# Patient Record
Sex: FEMALE | Race: WHITE | Employment: OTHER | URBAN - METROPOLITAN AREA
[De-identification: names, ages, dates, MRNs, and addresses within clinical notes are randomized per-mention and may not be internally consistent; named-entity substitution may affect disease eponyms.]

---

## 2024-07-17 ENCOUNTER — APPOINTMENT (OUTPATIENT)
Dept: ULTRASOUND IMAGING | Age: 84
DRG: 272 | End: 2024-07-17
Payer: MEDICARE

## 2024-07-17 ENCOUNTER — HOSPITAL ENCOUNTER (INPATIENT)
Age: 84
LOS: 2 days | Discharge: HOME | DRG: 272 | End: 2024-07-19
Attending: EMERGENCY MEDICINE | Admitting: STUDENT IN AN ORGANIZED HEALTH CARE EDUCATION/TRAINING PROGRAM
Payer: MEDICARE

## 2024-07-17 DIAGNOSIS — I82.409 DVT (DEEP VENOUS THROMBOSIS) (HCC): ICD-10-CM

## 2024-07-17 DIAGNOSIS — I82.412 ACUTE DEEP VEIN THROMBOSIS (DVT) OF FEMORAL VEIN OF LEFT LOWER EXTREMITY (HCC): Primary | ICD-10-CM

## 2024-07-17 PROBLEM — I82.402 LEG DVT (DEEP VENOUS THROMBOEMBOLISM), ACUTE, LEFT (HCC): Status: ACTIVE | Noted: 2024-07-17

## 2024-07-17 LAB
ALBUMIN SERPL-MCNC: 3.1 GM/DL (ref 3.4–5)
ALP BLD-CCNC: 80 IU/L (ref 40–129)
ALT SERPL-CCNC: 10 U/L (ref 10–40)
ANION GAP SERPL CALCULATED.3IONS-SCNC: 13 MMOL/L (ref 7–16)
APTT: 28.2 SECONDS (ref 25.1–37.1)
AST SERPL-CCNC: 23 IU/L (ref 15–37)
BASOPHILS ABSOLUTE: 0 K/CU MM
BASOPHILS RELATIVE PERCENT: 0.4 % (ref 0–1)
BILIRUB SERPL-MCNC: 0.8 MG/DL (ref 0–1)
BUN SERPL-MCNC: 15 MG/DL (ref 6–23)
CALCIUM SERPL-MCNC: 8.9 MG/DL (ref 8.3–10.6)
CHLORIDE BLD-SCNC: 100 MMOL/L (ref 99–110)
CO2: 26 MMOL/L (ref 21–32)
CREAT SERPL-MCNC: 1.3 MG/DL (ref 0.6–1.1)
DIFFERENTIAL TYPE: ABNORMAL
EOSINOPHILS ABSOLUTE: 0 K/CU MM
EOSINOPHILS RELATIVE PERCENT: 0 % (ref 0–3)
GFR, ESTIMATED: 40 ML/MIN/1.73M2
GLUCOSE SERPL-MCNC: 139 MG/DL (ref 70–99)
HCT VFR BLD CALC: 38.6 % (ref 37–47)
HEMOGLOBIN: 12 GM/DL (ref 12.5–16)
IMMATURE NEUTROPHIL %: 0.3 % (ref 0–0.43)
INR BLD: 0.9 INDEX
LYMPHOCYTES ABSOLUTE: 1.8 K/CU MM
LYMPHOCYTES RELATIVE PERCENT: 23 % (ref 24–44)
MCH RBC QN AUTO: 31.7 PG (ref 27–31)
MCHC RBC AUTO-ENTMCNC: 31.1 % (ref 32–36)
MCV RBC AUTO: 101.8 FL (ref 78–100)
MONOCYTES ABSOLUTE: 0.9 K/CU MM
MONOCYTES RELATIVE PERCENT: 11.6 % (ref 0–4)
NEUTROPHILS ABSOLUTE: 5.1 K/CU MM
NEUTROPHILS RELATIVE PERCENT: 64.7 % (ref 36–66)
PDW BLD-RTO: 15.5 % (ref 11.7–14.9)
PLATELET # BLD: 113 K/CU MM (ref 140–440)
PMV BLD AUTO: 12.4 FL (ref 7.5–11.1)
POTASSIUM SERPL-SCNC: 4.3 MMOL/L (ref 3.5–5.1)
PROTHROMBIN TIME: 13.2 SECONDS (ref 11.7–14.5)
RBC # BLD: 3.79 M/CU MM (ref 4.2–5.4)
REASON FOR REJECTION: NORMAL
REJECTED TEST: NORMAL
SODIUM BLD-SCNC: 139 MMOL/L (ref 135–145)
TOTAL IMMATURE NEUTOROPHIL: 0.02 K/CU MM
TOTAL PROTEIN: 6 GM/DL (ref 6.4–8.2)
WBC # BLD: 7.9 K/CU MM (ref 4–10.5)

## 2024-07-17 PROCEDURE — 96372 THER/PROPH/DIAG INJ SC/IM: CPT

## 2024-07-17 PROCEDURE — 2700000000 HC OXYGEN THERAPY PER DAY

## 2024-07-17 PROCEDURE — 85730 THROMBOPLASTIN TIME PARTIAL: CPT

## 2024-07-17 PROCEDURE — 2580000003 HC RX 258: Performed by: STUDENT IN AN ORGANIZED HEALTH CARE EDUCATION/TRAINING PROGRAM

## 2024-07-17 PROCEDURE — 93971 EXTREMITY STUDY: CPT

## 2024-07-17 PROCEDURE — 85025 COMPLETE CBC W/AUTO DIFF WBC: CPT

## 2024-07-17 PROCEDURE — G0378 HOSPITAL OBSERVATION PER HR: HCPCS

## 2024-07-17 PROCEDURE — 1200000000 HC SEMI PRIVATE

## 2024-07-17 PROCEDURE — 85610 PROTHROMBIN TIME: CPT

## 2024-07-17 PROCEDURE — 99285 EMERGENCY DEPT VISIT HI MDM: CPT

## 2024-07-17 PROCEDURE — 6360000002 HC RX W HCPCS: Performed by: EMERGENCY MEDICINE

## 2024-07-17 PROCEDURE — 80053 COMPREHEN METABOLIC PANEL: CPT

## 2024-07-17 PROCEDURE — 6370000000 HC RX 637 (ALT 250 FOR IP): Performed by: STUDENT IN AN ORGANIZED HEALTH CARE EDUCATION/TRAINING PROGRAM

## 2024-07-17 RX ORDER — METOPROLOL TARTRATE 50 MG/1
50 TABLET, FILM COATED ORAL 2 TIMES DAILY
COMMUNITY
Start: 2011-05-26 | End: 2024-07-20

## 2024-07-17 RX ORDER — ACETAMINOPHEN 650 MG/1
650 SUPPOSITORY RECTAL EVERY 6 HOURS PRN
Status: DISCONTINUED | OUTPATIENT
Start: 2024-07-17 | End: 2024-07-19 | Stop reason: HOSPADM

## 2024-07-17 RX ORDER — SODIUM CHLORIDE 9 MG/ML
INJECTION, SOLUTION INTRAVENOUS PRN
Status: DISCONTINUED | OUTPATIENT
Start: 2024-07-17 | End: 2024-07-17

## 2024-07-17 RX ORDER — LEVOTHYROXINE SODIUM 0.15 MG/1
150 TABLET ORAL DAILY
Status: DISCONTINUED | OUTPATIENT
Start: 2024-07-18 | End: 2024-07-19 | Stop reason: HOSPADM

## 2024-07-17 RX ORDER — SODIUM CHLORIDE 0.9 % (FLUSH) 0.9 %
5-40 SYRINGE (ML) INJECTION PRN
Status: DISCONTINUED | OUTPATIENT
Start: 2024-07-17 | End: 2024-07-17

## 2024-07-17 RX ORDER — ENOXAPARIN SODIUM 100 MG/ML
1 INJECTION SUBCUTANEOUS 2 TIMES DAILY
Status: DISCONTINUED | OUTPATIENT
Start: 2024-07-18 | End: 2024-07-17

## 2024-07-17 RX ORDER — LANOLIN ALCOHOL/MO/W.PET/CERES
400 CREAM (GRAM) TOPICAL 2 TIMES DAILY
COMMUNITY
Start: 2024-04-18

## 2024-07-17 RX ORDER — LANOLIN ALCOHOL/MO/W.PET/CERES
3 CREAM (GRAM) TOPICAL NIGHTLY PRN
Status: DISCONTINUED | OUTPATIENT
Start: 2024-07-17 | End: 2024-07-17

## 2024-07-17 RX ORDER — ENOXAPARIN SODIUM 100 MG/ML
1 INJECTION SUBCUTANEOUS 2 TIMES DAILY
Status: DISCONTINUED | OUTPATIENT
Start: 2024-07-18 | End: 2024-07-18

## 2024-07-17 RX ORDER — ONDANSETRON 2 MG/ML
4 INJECTION INTRAMUSCULAR; INTRAVENOUS EVERY 6 HOURS PRN
Status: DISCONTINUED | OUTPATIENT
Start: 2024-07-17 | End: 2024-07-19 | Stop reason: HOSPADM

## 2024-07-17 RX ORDER — VALACYCLOVIR HYDROCHLORIDE 500 MG/1
500 TABLET, FILM COATED ORAL DAILY
COMMUNITY
Start: 2014-12-04

## 2024-07-17 RX ORDER — POTASSIUM CHLORIDE 7.45 MG/ML
10 INJECTION INTRAVENOUS PRN
Status: DISCONTINUED | OUTPATIENT
Start: 2024-07-17 | End: 2024-07-17

## 2024-07-17 RX ORDER — MAGNESIUM SULFATE IN WATER 40 MG/ML
2000 INJECTION, SOLUTION INTRAVENOUS PRN
Status: DISCONTINUED | OUTPATIENT
Start: 2024-07-17 | End: 2024-07-19 | Stop reason: HOSPADM

## 2024-07-17 RX ORDER — POLYETHYLENE GLYCOL 3350 17 G/17G
17 POWDER, FOR SOLUTION ORAL DAILY PRN
Status: DISCONTINUED | OUTPATIENT
Start: 2024-07-17 | End: 2024-07-19 | Stop reason: HOSPADM

## 2024-07-17 RX ORDER — LEFLUNOMIDE 20 MG/1
10 TABLET ORAL DAILY
COMMUNITY
Start: 2011-05-26

## 2024-07-17 RX ORDER — POTASSIUM CHLORIDE 20 MEQ/1
20 TABLET, EXTENDED RELEASE ORAL 2 TIMES DAILY
COMMUNITY
Start: 2023-07-11

## 2024-07-17 RX ORDER — SODIUM CHLORIDE 0.9 % (FLUSH) 0.9 %
5-40 SYRINGE (ML) INJECTION PRN
Status: DISCONTINUED | OUTPATIENT
Start: 2024-07-17 | End: 2024-07-19 | Stop reason: HOSPADM

## 2024-07-17 RX ORDER — PREDNISONE 5 MG/1
5 TABLET ORAL DAILY
Status: DISCONTINUED | OUTPATIENT
Start: 2024-07-18 | End: 2024-07-19 | Stop reason: HOSPADM

## 2024-07-17 RX ORDER — ENOXAPARIN SODIUM 100 MG/ML
1 INJECTION SUBCUTANEOUS ONCE
Status: COMPLETED | OUTPATIENT
Start: 2024-07-17 | End: 2024-07-17

## 2024-07-17 RX ORDER — POTASSIUM CHLORIDE 20 MEQ/1
20 TABLET, EXTENDED RELEASE ORAL 2 TIMES DAILY
Status: DISCONTINUED | OUTPATIENT
Start: 2024-07-17 | End: 2024-07-19 | Stop reason: HOSPADM

## 2024-07-17 RX ORDER — POTASSIUM CHLORIDE 7.45 MG/ML
10 INJECTION INTRAVENOUS PRN
Status: DISCONTINUED | OUTPATIENT
Start: 2024-07-17 | End: 2024-07-19 | Stop reason: HOSPADM

## 2024-07-17 RX ORDER — MAGNESIUM SULFATE IN WATER 40 MG/ML
2000 INJECTION, SOLUTION INTRAVENOUS PRN
Status: DISCONTINUED | OUTPATIENT
Start: 2024-07-17 | End: 2024-07-17

## 2024-07-17 RX ORDER — ACETAMINOPHEN 650 MG/1
650 SUPPOSITORY RECTAL EVERY 6 HOURS PRN
Status: DISCONTINUED | OUTPATIENT
Start: 2024-07-17 | End: 2024-07-17

## 2024-07-17 RX ORDER — VALACYCLOVIR HYDROCHLORIDE 500 MG/1
500 TABLET, FILM COATED ORAL DAILY
Status: DISCONTINUED | OUTPATIENT
Start: 2024-07-18 | End: 2024-07-19 | Stop reason: HOSPADM

## 2024-07-17 RX ORDER — SODIUM CHLORIDE 0.9 % (FLUSH) 0.9 %
5-40 SYRINGE (ML) INJECTION EVERY 12 HOURS SCHEDULED
Status: DISCONTINUED | OUTPATIENT
Start: 2024-07-17 | End: 2024-07-17

## 2024-07-17 RX ORDER — PANTOPRAZOLE SODIUM 40 MG/1
40 TABLET, DELAYED RELEASE ORAL DAILY
COMMUNITY
Start: 2019-05-23 | End: 2024-09-10

## 2024-07-17 RX ORDER — ACETAMINOPHEN 325 MG/1
650 TABLET ORAL EVERY 6 HOURS PRN
Status: DISCONTINUED | OUTPATIENT
Start: 2024-07-17 | End: 2024-07-17

## 2024-07-17 RX ORDER — LEVOTHYROXINE SODIUM 150 MCG
150 TABLET ORAL DAILY
COMMUNITY
Start: 2024-01-10 | End: 2025-01-09

## 2024-07-17 RX ORDER — PANTOPRAZOLE SODIUM 40 MG/1
40 TABLET, DELAYED RELEASE ORAL DAILY
Status: DISCONTINUED | OUTPATIENT
Start: 2024-07-18 | End: 2024-07-19 | Stop reason: HOSPADM

## 2024-07-17 RX ORDER — ONDANSETRON 4 MG/1
4 TABLET, ORALLY DISINTEGRATING ORAL EVERY 8 HOURS PRN
Status: DISCONTINUED | OUTPATIENT
Start: 2024-07-17 | End: 2024-07-17

## 2024-07-17 RX ORDER — LANOLIN ALCOHOL/MO/W.PET/CERES
3 CREAM (GRAM) TOPICAL NIGHTLY PRN
Status: DISCONTINUED | OUTPATIENT
Start: 2024-07-17 | End: 2024-07-19 | Stop reason: HOSPADM

## 2024-07-17 RX ORDER — ONDANSETRON 2 MG/ML
4 INJECTION INTRAMUSCULAR; INTRAVENOUS EVERY 6 HOURS PRN
Status: DISCONTINUED | OUTPATIENT
Start: 2024-07-17 | End: 2024-07-17

## 2024-07-17 RX ORDER — LANOLIN ALCOHOL/MO/W.PET/CERES
400 CREAM (GRAM) TOPICAL 2 TIMES DAILY
Status: DISCONTINUED | OUTPATIENT
Start: 2024-07-17 | End: 2024-07-19 | Stop reason: HOSPADM

## 2024-07-17 RX ORDER — LEFLUNOMIDE 20 MG/1
10 TABLET ORAL DAILY
Status: DISCONTINUED | OUTPATIENT
Start: 2024-07-18 | End: 2024-07-19 | Stop reason: HOSPADM

## 2024-07-17 RX ORDER — PREDNISONE 2.5 MG/1
5 TABLET ORAL DAILY
COMMUNITY

## 2024-07-17 RX ORDER — FUROSEMIDE 20 MG/1
20 TABLET ORAL DAILY PRN
COMMUNITY
Start: 2022-03-23

## 2024-07-17 RX ORDER — POLYETHYLENE GLYCOL 3350 17 G/17G
17 POWDER, FOR SOLUTION ORAL DAILY PRN
Status: DISCONTINUED | OUTPATIENT
Start: 2024-07-17 | End: 2024-07-17

## 2024-07-17 RX ORDER — METOPROLOL TARTRATE 50 MG/1
50 TABLET, FILM COATED ORAL 2 TIMES DAILY
Status: DISCONTINUED | OUTPATIENT
Start: 2024-07-17 | End: 2024-07-19 | Stop reason: HOSPADM

## 2024-07-17 RX ORDER — SODIUM CHLORIDE 9 MG/ML
INJECTION, SOLUTION INTRAVENOUS PRN
Status: DISCONTINUED | OUTPATIENT
Start: 2024-07-17 | End: 2024-07-19 | Stop reason: HOSPADM

## 2024-07-17 RX ADMIN — Medication 3 MG: at 21:46

## 2024-07-17 RX ADMIN — SODIUM CHLORIDE, PRESERVATIVE FREE 10 ML: 5 INJECTION INTRAVENOUS at 21:47

## 2024-07-17 RX ADMIN — POTASSIUM CHLORIDE 20 MEQ: 1500 TABLET, EXTENDED RELEASE ORAL at 21:46

## 2024-07-17 RX ADMIN — ENOXAPARIN SODIUM 90 MG: 100 INJECTION SUBCUTANEOUS at 16:35

## 2024-07-17 RX ADMIN — Medication 400 MG: at 21:45

## 2024-07-17 RX ADMIN — ACETAMINOPHEN 650 MG: 325 TABLET ORAL at 21:46

## 2024-07-17 RX ADMIN — METOPROLOL TARTRATE 50 MG: 50 TABLET, FILM COATED ORAL at 21:46

## 2024-07-17 ASSESSMENT — PAIN DESCRIPTION - ORIENTATION
ORIENTATION: LEFT

## 2024-07-17 ASSESSMENT — PAIN - FUNCTIONAL ASSESSMENT
PAIN_FUNCTIONAL_ASSESSMENT: ACTIVITIES ARE NOT PREVENTED
PAIN_FUNCTIONAL_ASSESSMENT: PREVENTS OR INTERFERES SOME ACTIVE ACTIVITIES AND ADLS
PAIN_FUNCTIONAL_ASSESSMENT: 0-10

## 2024-07-17 ASSESSMENT — PAIN SCALES - GENERAL
PAINLEVEL_OUTOF10: 6
PAINLEVEL_OUTOF10: 6
PAINLEVEL_OUTOF10: 8
PAINLEVEL_OUTOF10: 3

## 2024-07-17 ASSESSMENT — PAIN DESCRIPTION - LOCATION
LOCATION: LEG

## 2024-07-17 ASSESSMENT — PAIN DESCRIPTION - PAIN TYPE: TYPE: ACUTE PAIN

## 2024-07-17 ASSESSMENT — PAIN DESCRIPTION - ONSET: ONSET: ON-GOING

## 2024-07-17 ASSESSMENT — PAIN DESCRIPTION - FREQUENCY: FREQUENCY: CONTINUOUS

## 2024-07-17 ASSESSMENT — PAIN SCALES - WONG BAKER: WONGBAKER_NUMERICALRESPONSE: HURTS A LITTLE BIT

## 2024-07-17 ASSESSMENT — PAIN DESCRIPTION - DESCRIPTORS
DESCRIPTORS: THROBBING
DESCRIPTORS: THROBBING

## 2024-07-17 ASSESSMENT — PAIN DESCRIPTION - DIRECTION: RADIATING_TOWARDS: DENIES

## 2024-07-17 NOTE — H&P
History and Physical      Name:  Edna Dowell /Age/Sex: 1940  (84 y.o. female)   MRN & CSN:  [id number] & [id number] Encounter Date/Time: 2024 7:42 PM   Location:  [facility]-1/OTF1 PCP: No primary care provider on file.       Hospital Day: 1    Assessment and Plan:     Patient is a 84-year-old female who presented with leg swelling. Transferred from [place] ED.     # Acute unprovoked occlusive DVT of left femoral vein  - Endorsed worsening left leg swelling over past 3-4 days. No fall, injury, recent surgeries, travel or prolonged immobility. No previous VTE. No weight loss.   - NVI distally. US showed extensive occlusive DVT of CFV extending into calf veins.   - Started on full-dose Lovenox in ED, continue.   - Cardiology consult for possible thrombectomy, appreciate assistance.     # Acute hypoxemic respiratory failure  - Denied any SOB, presyncope, syncope, CP, orthopnea or PND.  - Requiring 2L on arrival with mild tachycardia.  - Follow-up CTPE, on Lovenox as above.     # CKD3b  - Labs stable, monitor labs after CTPE.    # Essential hypertension  - Continue Lopressor.    # Rheumatoid arthritis   - Continue Arava and Prednisone with prophylactic Valtrex.     # Acquired hypothyroidism  - Continue Synthroid.  - Follow-up repeat TSH.    # GERD  - Continue PPI.    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 Has been in remission for over 10 years. Followed by H/O at [facility].     # Class I obesity  - BMI 34.2.     Checklist:  Advanced care planning: full  Diet: NPO past midnight pending Cardiology evaluation   DVT ppx: Lovenox    Disposition: admit to inpatient.  Estimated discharge: 1-2 day(s).  Current living situation: home.  Expected disposition: home.    Spoke with ED provider who recommended admission for the patient and I agree with that plan.  Personally reviewed lab studies and imaging.  EKG interpreted personally and results as stated above.  Imaging that was interpreted

## 2024-07-17 NOTE — ED PROVIDER NOTES
medications:  Medications   enoxaparin (LOVENOX) injection 90 mg (has no administration in time range)         Chronic conditions affecting care: History of multiple myeloma    Social Determinants : None    Records Reviewed : Outpatient Notes patient was seen by [facility] on 5/14/2024, has history of multiple myeloma stage II intervention.  Also has history of localized amyloidosis of the rectum.  Had been on maintenance therapy with Revlimid through 2018 but is not currently, had an autologous bone marrow transplantation at [facility] in 2011    There is a telephone encounter from yesterday that patient was having issues with leg swelling, and reported having an ultrasound on it scheduled for Thursday but unable to walk on it yesterday.  Has a follow-up appointment and lab on 7/25    Disposition Considerations (tests considered but not done, Shared Decision Making, Pt Expectation of Test or Tx.):     Patient's creatinine 1.3, normal sodium potassium, normal BUN.  Hemoglobin of 12 with a normal white blood cell count.  Platelets of 113, which is slightly low.    She is found to have extensive DVT that is mostly occlusive, including at the left femoral vein.  Given this I will consult our cardiology vascular team so that she can be evaluated for potential intervention.  She is not able to ambulate or get around due to the pain and swelling in the leg currently and we will plan to admit.  She was comfortable with plan to consult our cardiology team and transferred to the [facility] for admission.   at bedside, they both were comfortable with this and had no other questions at this time.  1 dose of Lovenox will be given given her slightly low platelets.      ED Course as of 07/17/24 1620   Wed Jul 17, 2024   1503 Checked on patient's, she is in no distress, resting on the bed.  Labs have returned, ultrasound tech was called in, we are awaiting their arrival.  Patient

## 2024-07-18 ENCOUNTER — APPOINTMENT (OUTPATIENT)
Dept: NON INVASIVE DIAGNOSTICS | Age: 84
DRG: 272 | End: 2024-07-18
Payer: MEDICARE

## 2024-07-18 ENCOUNTER — APPOINTMENT (OUTPATIENT)
Dept: CT IMAGING | Age: 84
DRG: 272 | End: 2024-07-18
Payer: MEDICARE

## 2024-07-18 LAB
ACTIVATED CLOTTING TIME, LOW RANGE: >400 SEC
ANION GAP SERPL CALCULATED.3IONS-SCNC: 10 MMOL/L (ref 7–16)
BASOPHILS ABSOLUTE: 0 K/CU MM
BASOPHILS RELATIVE PERCENT: 0.5 % (ref 0–1)
BUN SERPL-MCNC: 15 MG/DL (ref 6–23)
CALCIUM SERPL-MCNC: 8.2 MG/DL (ref 8.3–10.6)
CHLORIDE BLD-SCNC: 104 MMOL/L (ref 99–110)
CO2: 26 MMOL/L (ref 21–32)
CREAT SERPL-MCNC: 1.3 MG/DL (ref 0.6–1.1)
DIFFERENTIAL TYPE: ABNORMAL
ECHO AO ROOT DIAM: 3.2 CM
ECHO AO ROOT INDEX: 1.67 CM/M2
ECHO AV AREA PEAK VELOCITY: 1.8 CM2
ECHO AV AREA VTI: 2.1 CM2
ECHO AV AREA/BSA PEAK VELOCITY: 0.9 CM2/M2
ECHO AV AREA/BSA VTI: 1.1 CM2/M2
ECHO AV MEAN GRADIENT: 4 MMHG
ECHO AV MEAN VELOCITY: 1 M/S
ECHO AV PEAK GRADIENT: 7 MMHG
ECHO AV PEAK VELOCITY: 1.3 M/S
ECHO AV VELOCITY RATIO: 0.62
ECHO AV VTI: 21 CM
ECHO BSA: 1.98 M2
ECHO BSA: 1.98 M2
ECHO LA AREA 4C: 25 CM2
ECHO LA DIAMETER INDEX: 0.99 CM/M2
ECHO LA DIAMETER: 1.9 CM
ECHO LA MAJOR AXIS: 7.1 CM
ECHO LA TO AORTIC ROOT RATIO: 0.59
ECHO LA VOL MOD A4C: 69 ML (ref 22–52)
ECHO LA VOLUME INDEX MOD A4C: 36 ML/M2 (ref 16–34)
ECHO LV E' LATERAL VELOCITY: 9 CM/S
ECHO LV E' SEPTAL VELOCITY: 10 CM/S
ECHO LV EDV A4C: 58 ML
ECHO LV EDV INDEX A4C: 30 ML/M2
ECHO LV EJECTION FRACTION A4C: 65 %
ECHO LV ESV A4C: 20 ML
ECHO LV ESV INDEX A4C: 10 ML/M2
ECHO LV FRACTIONAL SHORTENING: 30 % (ref 28–44)
ECHO LV INTERNAL DIMENSION DIASTOLE INDEX: 1.93 CM/M2
ECHO LV INTERNAL DIMENSION DIASTOLIC: 3.7 CM (ref 3.9–5.3)
ECHO LV INTERNAL DIMENSION SYSTOLIC INDEX: 1.35 CM/M2
ECHO LV INTERNAL DIMENSION SYSTOLIC: 2.6 CM
ECHO LV IVSD: 1.3 CM (ref 0.6–0.9)
ECHO LV MASS 2D: 166.5 G (ref 67–162)
ECHO LV MASS INDEX 2D: 86.7 G/M2 (ref 43–95)
ECHO LV POSTERIOR WALL DIASTOLIC: 1.3 CM (ref 0.6–0.9)
ECHO LV RELATIVE WALL THICKNESS RATIO: 0.7
ECHO LVOT AREA: 2.8 CM2
ECHO LVOT AV VTI INDEX: 0.73
ECHO LVOT DIAM: 1.9 CM
ECHO LVOT MEAN GRADIENT: 1 MMHG
ECHO LVOT PEAK GRADIENT: 3 MMHG
ECHO LVOT PEAK VELOCITY: 0.8 M/S
ECHO LVOT STROKE VOLUME INDEX: 22.7 ML/M2
ECHO LVOT SV: 43.6 ML
ECHO LVOT VTI: 15.4 CM
ECHO MV A VELOCITY: 0.77 M/S
ECHO MV AREA VTI: 1.7 CM2
ECHO MV E VELOCITY: 0.91 M/S
ECHO MV E/A RATIO: 1.18
ECHO MV E/E' LATERAL: 10.11
ECHO MV E/E' RATIO (AVERAGED): 9.61
ECHO MV E/E' SEPTAL: 9.1
ECHO MV LVOT VTI INDEX: 1.66
ECHO MV MAX VELOCITY: 1 M/S
ECHO MV MEAN GRADIENT: 2 MMHG
ECHO MV MEAN VELOCITY: 0.7 M/S
ECHO MV PEAK GRADIENT: 4 MMHG
ECHO MV VTI: 25.5 CM
ECHO RV MID DIMENSION: 3.5 CM
EOSINOPHILS ABSOLUTE: 0 K/CU MM
EOSINOPHILS RELATIVE PERCENT: 0.5 % (ref 0–3)
ESTIMATED AVERAGE GLUCOSE: 120 MG/DL
FOLATE SERPL-MCNC: 15.4 NG/ML (ref 3.1–17.5)
GFR, ESTIMATED: 40 ML/MIN/1.73M2
GLUCOSE SERPL-MCNC: 105 MG/DL (ref 70–99)
HBA1C MFR BLD: 5.8 % (ref 4.2–6.3)
HCT VFR BLD CALC: 34.7 % (ref 37–47)
HEMOGLOBIN: 10.7 GM/DL (ref 12.5–16)
IMMATURE NEUTROPHIL %: 0.5 % (ref 0–0.43)
INR BLD: 1.1 INDEX
LYMPHOCYTES ABSOLUTE: 1.7 K/CU MM
LYMPHOCYTES RELATIVE PERCENT: 27.3 % (ref 24–44)
MCH RBC QN AUTO: 31.6 PG (ref 27–31)
MCHC RBC AUTO-ENTMCNC: 30.8 % (ref 32–36)
MCV RBC AUTO: 102.4 FL (ref 78–100)
MONOCYTES ABSOLUTE: 0.7 K/CU MM
MONOCYTES RELATIVE PERCENT: 11.7 % (ref 0–4)
NEUTROPHILS ABSOLUTE: 3.6 K/CU MM
NEUTROPHILS RELATIVE PERCENT: 59.5 % (ref 36–66)
NUCLEATED RBC %: 0 %
PDW BLD-RTO: 15.3 % (ref 11.7–14.9)
PLATELET # BLD: 108 K/CU MM (ref 140–440)
PMV BLD AUTO: 12.1 FL (ref 7.5–11.1)
POTASSIUM SERPL-SCNC: 3.6 MMOL/L (ref 3.5–5.1)
PRO-BNP: 1376 PG/ML
PROTHROMBIN TIME: 14.3 SECONDS (ref 11.7–14.5)
RBC # BLD: 3.39 M/CU MM (ref 4.2–5.4)
SODIUM BLD-SCNC: 140 MMOL/L (ref 135–145)
T4 FREE SERPL-MCNC: 1.98 NG/DL (ref 0.9–1.8)
TOTAL IMMATURE NEUTOROPHIL: 0.03 K/CU MM
TOTAL NUCLEATED RBC: 0 K/CU MM
TROPONIN, HIGH SENSITIVITY: 50 NG/L (ref 0–14)
TSH SERPL DL<=0.005 MIU/L-ACNC: 0.2 UIU/ML (ref 0.27–4.2)
VITAMIN B-12: 378 PG/ML (ref 211–911)
WBC # BLD: 6.1 K/CU MM (ref 4–10.5)

## 2024-07-18 PROCEDURE — B54CZZZ ULTRASONOGRAPHY OF LEFT LOWER EXTREMITY VEINS: ICD-10-PCS | Performed by: INTERNAL MEDICINE

## 2024-07-18 PROCEDURE — 84439 ASSAY OF FREE THYROXINE: CPT

## 2024-07-18 PROCEDURE — 85347 COAGULATION TIME ACTIVATED: CPT | Performed by: INTERNAL MEDICINE

## 2024-07-18 PROCEDURE — 37187 VENOUS MECH THROMBECTOMY: CPT | Performed by: INTERNAL MEDICINE

## 2024-07-18 PROCEDURE — 2709999900 HC NON-CHARGEABLE SUPPLY: Performed by: INTERNAL MEDICINE

## 2024-07-18 PROCEDURE — 85347 COAGULATION TIME ACTIVATED: CPT

## 2024-07-18 PROCEDURE — 93306 TTE W/DOPPLER COMPLETE: CPT | Performed by: INTERNAL MEDICINE

## 2024-07-18 PROCEDURE — C1725 CATH, TRANSLUMIN NON-LASER: HCPCS | Performed by: INTERNAL MEDICINE

## 2024-07-18 PROCEDURE — 84443 ASSAY THYROID STIM HORMONE: CPT

## 2024-07-18 PROCEDURE — 83880 ASSAY OF NATRIURETIC PEPTIDE: CPT

## 2024-07-18 PROCEDURE — 93306 TTE W/DOPPLER COMPLETE: CPT

## 2024-07-18 PROCEDURE — 6360000002 HC RX W HCPCS

## 2024-07-18 PROCEDURE — 04CL3ZZ EXTIRPATION OF MATTER FROM LEFT FEMORAL ARTERY, PERCUTANEOUS APPROACH: ICD-10-PCS | Performed by: INTERNAL MEDICINE

## 2024-07-18 PROCEDURE — 82607 VITAMIN B-12: CPT

## 2024-07-18 PROCEDURE — B51C1ZZ FLUOROSCOPY OF LEFT LOWER EXTREMITY VEINS USING LOW OSMOLAR CONTRAST: ICD-10-PCS | Performed by: INTERNAL MEDICINE

## 2024-07-18 PROCEDURE — 36415 COLL VENOUS BLD VENIPUNCTURE: CPT

## 2024-07-18 PROCEDURE — 2700000000 HC OXYGEN THERAPY PER DAY

## 2024-07-18 PROCEDURE — 82746 ASSAY OF FOLIC ACID SERUM: CPT

## 2024-07-18 PROCEDURE — 75820 VEIN X-RAY ARM/LEG: CPT | Performed by: INTERNAL MEDICINE

## 2024-07-18 PROCEDURE — 75825 VEIN X-RAY TRUNK: CPT | Performed by: INTERNAL MEDICINE

## 2024-07-18 PROCEDURE — 2500000003 HC RX 250 WO HCPCS: Performed by: INTERNAL MEDICINE

## 2024-07-18 PROCEDURE — 80048 BASIC METABOLIC PNL TOTAL CA: CPT

## 2024-07-18 PROCEDURE — 6360000004 HC RX CONTRAST MEDICATION: Performed by: INTERNAL MEDICINE

## 2024-07-18 PROCEDURE — C1769 GUIDE WIRE: HCPCS | Performed by: INTERNAL MEDICINE

## 2024-07-18 PROCEDURE — 2580000003 HC RX 258: Performed by: INTERNAL MEDICINE

## 2024-07-18 PROCEDURE — 6370000000 HC RX 637 (ALT 250 FOR IP): Performed by: STUDENT IN AN ORGANIZED HEALTH CARE EDUCATION/TRAINING PROGRAM

## 2024-07-18 PROCEDURE — 37226 HC FEM POP TERR PLASTY AND STENT: CPT | Performed by: INTERNAL MEDICINE

## 2024-07-18 PROCEDURE — 36005 INJECTION EXT VENOGRAPHY: CPT | Performed by: INTERNAL MEDICINE

## 2024-07-18 PROCEDURE — 6360000004 HC RX CONTRAST MEDICATION: Performed by: STUDENT IN AN ORGANIZED HEALTH CARE EDUCATION/TRAINING PROGRAM

## 2024-07-18 PROCEDURE — 94761 N-INVAS EAR/PLS OXIMETRY MLT: CPT

## 2024-07-18 PROCEDURE — 84484 ASSAY OF TROPONIN QUANT: CPT

## 2024-07-18 PROCEDURE — C1894 INTRO/SHEATH, NON-LASER: HCPCS | Performed by: INTERNAL MEDICINE

## 2024-07-18 PROCEDURE — 85025 COMPLETE CBC W/AUTO DIFF WBC: CPT

## 2024-07-18 PROCEDURE — 71275 CT ANGIOGRAPHY CHEST: CPT

## 2024-07-18 PROCEDURE — 36010 PLACE CATHETER IN VEIN: CPT | Performed by: INTERNAL MEDICINE

## 2024-07-18 PROCEDURE — 6360000002 HC RX W HCPCS: Performed by: INTERNAL MEDICINE

## 2024-07-18 PROCEDURE — 2580000003 HC RX 258: Performed by: STUDENT IN AN ORGANIZED HEALTH CARE EDUCATION/TRAINING PROGRAM

## 2024-07-18 PROCEDURE — 6360000002 HC RX W HCPCS: Performed by: STUDENT IN AN ORGANIZED HEALTH CARE EDUCATION/TRAINING PROGRAM

## 2024-07-18 PROCEDURE — 067N3DZ DILATION OF LEFT FEMORAL VEIN WITH INTRALUMINAL DEVICE, PERCUTANEOUS APPROACH: ICD-10-PCS | Performed by: INTERNAL MEDICINE

## 2024-07-18 PROCEDURE — 85610 PROTHROMBIN TIME: CPT

## 2024-07-18 PROCEDURE — 83036 HEMOGLOBIN GLYCOSYLATED A1C: CPT

## 2024-07-18 PROCEDURE — 37248 TRLUML BALO ANGIOP 1ST VEIN: CPT | Performed by: INTERNAL MEDICINE

## 2024-07-18 PROCEDURE — 1200000000 HC SEMI PRIVATE

## 2024-07-18 PROCEDURE — 99222 1ST HOSP IP/OBS MODERATE 55: CPT | Performed by: INTERNAL MEDICINE

## 2024-07-18 RX ORDER — HEPARIN SODIUM 10000 [USP'U]/ML
INJECTION, SOLUTION INTRAVENOUS; SUBCUTANEOUS PRN
Status: DISCONTINUED | OUTPATIENT
Start: 2024-07-18 | End: 2024-07-18 | Stop reason: HOSPADM

## 2024-07-18 RX ORDER — MIDAZOLAM HYDROCHLORIDE 1 MG/ML
INJECTION INTRAMUSCULAR; INTRAVENOUS PRN
Status: DISCONTINUED | OUTPATIENT
Start: 2024-07-18 | End: 2024-07-18 | Stop reason: HOSPADM

## 2024-07-18 RX ORDER — SODIUM CHLORIDE 0.9 % (FLUSH) 0.9 %
5-40 SYRINGE (ML) INJECTION 2 TIMES DAILY
Status: DISCONTINUED | OUTPATIENT
Start: 2024-07-18 | End: 2024-07-19 | Stop reason: HOSPADM

## 2024-07-18 RX ORDER — HEPARIN SODIUM 200 [USP'U]/100ML
INJECTION, SOLUTION INTRAVENOUS PRN
Status: DISCONTINUED | OUTPATIENT
Start: 2024-07-18 | End: 2024-07-18 | Stop reason: HOSPADM

## 2024-07-18 RX ORDER — SODIUM CHLORIDE 9 MG/ML
INJECTION, SOLUTION INTRAVENOUS CONTINUOUS PRN
Status: COMPLETED | OUTPATIENT
Start: 2024-07-18 | End: 2024-07-18

## 2024-07-18 RX ORDER — ENOXAPARIN SODIUM 100 MG/ML
1 INJECTION SUBCUTANEOUS 2 TIMES DAILY
Status: DISCONTINUED | OUTPATIENT
Start: 2024-07-18 | End: 2024-07-19 | Stop reason: HOSPADM

## 2024-07-18 RX ADMIN — Medication 400 MG: at 11:27

## 2024-07-18 RX ADMIN — IOPAMIDOL 75 ML: 755 INJECTION, SOLUTION INTRAVENOUS at 04:59

## 2024-07-18 RX ADMIN — POTASSIUM CHLORIDE 20 MEQ: 1500 TABLET, EXTENDED RELEASE ORAL at 19:42

## 2024-07-18 RX ADMIN — LEFLUNOMIDE 10 MG: 20 TABLET ORAL at 11:22

## 2024-07-18 RX ADMIN — LEVOTHYROXINE SODIUM 150 MCG: 0.15 TABLET ORAL at 06:03

## 2024-07-18 RX ADMIN — ENOXAPARIN SODIUM 90 MG: 100 INJECTION SUBCUTANEOUS at 06:03

## 2024-07-18 RX ADMIN — METOPROLOL TARTRATE 50 MG: 50 TABLET, FILM COATED ORAL at 11:21

## 2024-07-18 RX ADMIN — HYDROMORPHONE HYDROCHLORIDE 0.5 MG: 1 INJECTION, SOLUTION INTRAMUSCULAR; INTRAVENOUS; SUBCUTANEOUS at 18:46

## 2024-07-18 RX ADMIN — PANTOPRAZOLE SODIUM 40 MG: 40 TABLET, DELAYED RELEASE ORAL at 11:21

## 2024-07-18 RX ADMIN — POTASSIUM CHLORIDE 20 MEQ: 1500 TABLET, EXTENDED RELEASE ORAL at 11:21

## 2024-07-18 RX ADMIN — VALACYCLOVIR HYDROCHLORIDE 500 MG: 500 TABLET, FILM COATED ORAL at 11:21

## 2024-07-18 RX ADMIN — SODIUM CHLORIDE, PRESERVATIVE FREE 10 ML: 5 INJECTION INTRAVENOUS at 19:42

## 2024-07-18 RX ADMIN — METOPROLOL TARTRATE 50 MG: 50 TABLET, FILM COATED ORAL at 19:42

## 2024-07-18 RX ADMIN — PREDNISONE 5 MG: 5 TABLET ORAL at 11:21

## 2024-07-18 RX ADMIN — SODIUM CHLORIDE, PRESERVATIVE FREE 10 ML: 5 INJECTION INTRAVENOUS at 11:28

## 2024-07-18 RX ADMIN — Medication 400 MG: at 19:42

## 2024-07-18 RX ADMIN — ENOXAPARIN SODIUM 90 MG: 100 INJECTION SUBCUTANEOUS at 18:03

## 2024-07-18 ASSESSMENT — PAIN SCALES - WONG BAKER: WONGBAKER_NUMERICALRESPONSE: NO HURT

## 2024-07-18 ASSESSMENT — PAIN SCALES - GENERAL
PAINLEVEL_OUTOF10: 8
PAINLEVEL_OUTOF10: 5

## 2024-07-18 ASSESSMENT — PAIN DESCRIPTION - DESCRIPTORS: DESCRIPTORS: ACHING;PENETRATING

## 2024-07-18 ASSESSMENT — PAIN DESCRIPTION - LOCATION: LOCATION: LEG

## 2024-07-18 ASSESSMENT — PAIN DESCRIPTION - ORIENTATION: ORIENTATION: LEFT

## 2024-07-18 NOTE — DISCHARGE INSTRUCTIONS
PCP LIST    1. Call to schedule follow up hospital follow up appointment:   [facility]  [address]  [address]  [place], [place] [address]  Tel: [phone]    [facility]  [address]  [place], [place] [address]  [phone]     [facility]   [address], [place]   [phone]     2. Establish care with a primary care provider  Physician Finder [phone]     [facility]  [address], [place] [address]  [phone]  SHANTAL Gross,CNP    [facility]  [address] [place] [address]  [phone]  MD Kavya Keen MD Deanna N. Smith, APRN, CNP  Vivienne Bullock, SHANTAL Peters, CNP     [facility]  [address], [address], [place] [address]  [phone]  Arleen Giang, DO Venkatesh Hernandez PAMD Jagdish Galeano, PAJAS    [facility]  [address] [place] [address]  [phone]  Lynette Bai MD    [facility]  [address] [place], [place] [id number] [phone]  MD Benedicto Wright MD    [facility]  [address] [place] [address]  [phone]  SHANTAL Smith, SHANTAL Levin, CNP   MD Lani Da Silva, PAAbiodunC   Edilberto Arboleda MD    [facility] (*Active Waiting List*)   [address] [place], [place]  [phone]    Dr. Korina Loya MD  [address] [place] [id number] [phone]    [facility]  [address], [place], [place] [id number] [phone]    [facility]  [address]  , [place] [address]  [phone]  Gerard Pina,

## 2024-07-18 NOTE — PROGRESS NOTES
4 Eyes Skin Assessment     NAME:  Edna Dowell  YOB: 1940  MEDICAL RECORD NUMBER:  [id number]    The patient is being assessed for  Admission    I agree that at least one RN has performed a thorough Head to Toe Skin Assessment on the patient. ALL assessment sites listed below have been assessed.      Areas assessed by both nurses:    Head, Face, Ears, Shoulders, Back, Chest, Arms, Elbows, Hands, Sacrum. Buttock, Coccyx, Ischium, Legs. Feet and Heels, and Under Medical Devices         Does the Patient have a Wound? No noted wound(s)       Hector Prevention initiated by RN: Yes  Wound Care Orders initiated by RN: No    Pressure Injury (Stage 3,4, Unstageable, DTI, NWPT, and Complex wounds) if present, place Wound referral order by RN under : No    New Ostomies, if present place, Ostomy referral order under : No     Nurse 1 eSignature: Electronically signed by Nahed Fisher RN on 7/17/24 at 10:15 PM EDT    **SHARE this note so that the co-signing nurse can place an eSignature**    Nurse 2 eSignature: {Esignature:[id number]}

## 2024-07-18 NOTE — CARE COORDINATION
CM reviewed pt’s medical record and discussed pt in IDR. CM introduced self and explained the role of case management. CM in to see pt to initiate D/C planning. Pt is alert, oriented, friendly, and cooperative with assessment. PTA pt was independent with mobility and ADL's. CM placed PCP list in discharge instructions. Pt able to afford medications and food. Pt denies the need for HHC or SNF placement at this time.Pt denies any other DC needs.      Plan for discharge is home with  and grand-daughter. CM will continue to follow.     07/18/24 6368   Service Assessment   Patient Orientation Alert and Oriented   Cognition Alert   History Provided By Patient;Medical Record   Primary Caregiver Self   Accompanied By/Relationship N/A   Support Systems Spouse/Significant Other;Family Members   Patient's Healthcare Decision Maker is: Legal Next of Kin   PCP Verified by CM Yes   Last Visit to PCP Within last 3 months  (Pt states that she sees Dr. Abraham in [place])   Prior Functional Level Independent in ADLs/IADLs   Current Functional Level Assistance with the following:;Bathing;Toileting;Mobility   Can patient return to prior living arrangement Yes   Ability to make needs known: Good   Family able to assist with home care needs: Yes   Would you like for me to discuss the discharge plan with any other family members/significant others, and if so, who? Yes  ()   Financial Resources Medicare   Community Resources None   Social/Functional History   Lives With Spouse;Family   Type of Home Condo   Home Layout Two level;Able to Live on Main level with bedroom/bathroom   Home Access Level entry   Bathroom Shower/Tub Walk-in shower   Bathroom Equipment Grab bars in shower;Shower chair   Bathroom Accessibility Accessible   Receives Help From Family   ADL Assistance Independent   Homemaking Assistance Independent   Ambulation Assistance Independent   Transfer Assistance Independent   Active  No   Patient's  Info

## 2024-07-18 NOTE — PROGRESS NOTES
V2.0  [facility] Hospitalist Progress Note      Name:  Edna Dowell /Age/Sex: 1940  (84 y.o. female)   MRN & CSN:  [id number] & [id number] Encounter Date/Time: 2024 1:11 PM EDT    Location:  [address] [place] [address]A PCP: No primary care provider on file.       Hospital Day: 2    Assessment and Plan:   Patient is a 84-year-old female who presented with leg swelling. Transferred from [place] ED.      # Acute unprovoked occlusive DVT of left femoral vein  - Endorsed worsening left leg swelling over past 3-4 days. No fall, injury, recent surgeries, travel or prolonged immobility. No previous VTE. No weight loss.   - NVI distally. US showed extensive occlusive DVT of CFV extending into calf veins.   - Started on full-dose Lovenox in ED, continue.   - Cardiology consult for possible thrombectomy, appreciate assistance.   Status post thrombectomy tolerated the procedure  May monitor hemoglobin today may consider discharge tomorrow     # Acute hypoxemic respiratory failure  - Denied any SOB, presyncope, syncope, CP, orthopnea or PND.  - Requiring 2L on arrival with mild tachycardia.  - Follow-up CTPE, on Lovenox as above.     # CKD3b  - Labs stable, monitor labs after CTPE.     # Essential hypertension  - Continue Lopressor.     # Rheumatoid arthritis   - Continue Arava and Prednisone with prophylactic Valtrex.      # Acquired hypothyroidism  - Continue Synthroid.  - Follow-up repeat TSH.     # GERD  - Continue PPI.     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 Has been in remission for over 10 years. Followed by H/O at [facility].      # Class I obesity  - BMI 34.2.    Comment: Please note this report has been produced using speech recognition software and may contain errors related to that system including errors in grammar, punctuation, and spelling, as well as words and phrases that may be inappropriate. If there are any questions or concerns please feel free to contact the dictating provider for    Result Value Ref Range    LV EDV A4C 58 mL    LV ESV A4C 20 mL    IVSd 1.3 (A) 0.6 - 0.9 cm    LVIDd 3.7 (A) 3.9 - 5.3 cm    LVIDs 2.6 cm    LVOT Diameter 1.9 cm    LVOT Mean Gradient 1 mmHg    LVOT VTI 15.4 cm    LVOT Peak Velocity 0.8 m/s    LVOT Peak Gradient 3 mmHg    LVPWd 1.3 (A) 0.6 - 0.9 cm    LV E' Lateral Velocity 9 cm/s    LV E' Septal Velocity 10 cm/s    LV Ejection Fraction A4C 65 %    LVOT Area 2.8 cm2    LVOT SV 43.6 ml    LA Major Lucas 7.1 cm    LA Area 4C 25.0 cm2    LA Volume MOD A4C 69 (A) 22 - 52 mL    LA Diameter 1.9 cm    AV Mean Gradient 4 mmHg    AV VTI 21.0 cm    AV Mean Velocity 1.0 m/s    AV Peak Velocity 1.3 m/s    AV Peak Gradient 7 mmHg    AV Area by VTI 2.1 cm2    AV Area by Peak Velocity 1.8 cm2    Aortic Root 3.2 cm    MV A Velocity 0.77 m/s    MV E Velocity 0.91 m/s    MV Mean Gradient 2 mmHg    MV VTI 25.5 cm    MV Mean Velocity 0.7 m/s    MV Max Velocity 1.0 m/s    MV Peak Gradient 4 mmHg    MV Area by VTI 1.7 cm2    RV Mid Dimension 3.5 cm    Body Surface Area 1.98 m2    Fractional Shortening 2D 30 28 - 44 %    LV ESV Index A4C 10 mL/m2    LV EDV Index A4C 30 mL/m2    LVIDd Index 1.93 cm/m2    LVIDs Index 1.35 cm/m2    LV RWT Ratio 0.70     LV Mass 2D 166.5 (A) 67 - 162 g    LV Mass 2D Index 86.7 43 - 95 g/m2    MV E/A 1.18     E/E' Ratio (Averaged) 9.61     E/E' Lateral 10.11     E/E' Septal 9.10     LVOT Stroke Volume Index 22.7 mL/m2    LA Volume Index MOD A4C 36 (A) 16 - 34 ml/m2    LA Size Index 0.99 cm/m2    LA/AO Root Ratio 0.59     Ao Root Index 1.67 cm/m2    AV Velocity Ratio 0.62     LVOT:AV VTI Index 0.73     SUSAN/BSA VTI 1.1 cm2/m2    SUSAN/BSA Peak Velocity 0.9 cm2/m2    MV:LVOT VTI Index 1.66    Invasive vascular procedure    Collection Time: 07/18/24 10:52 AM   Result Value Ref Range    Body Surface Area 1.98 m2        Imaging/Diagnostics Last 24 Hours   Invasive vascular procedure    Result Date: 7/18/2024   Indication: Occlusive DVT Lt common femoral vein   lucent lesion in the right side of the T8 vertebral body, measuring 1.5 cm in long axis. Vertebroplasty has been performed at T12.     1. No evidence of pulmonary embolus or main pulmonary artery hypertension. 2. Subsegmental atelectasis in the left base. 3. Mild cardiomegaly. 4. Circumscribed lucent lesion in the right side of the T8 vertebral body measuring 1.5 cm in long axis. The lesion is nonspecific. If clinically germane recommend MRI thoracic spine for further evaluation. 5. Status post vertebroplasty in the T12 vertebral body. Electronically signed by Kai Salazar    Vascular duplex lower extremity venous left    Result Date: 7/17/2024  EXAMINATION: VAS DUP LOWER EXTREMITY VENOUS LEFT, 7/17/2024 3:43 PM HISTORY: LEG SWELLING, PAIN, DVT SUSPECTED Comparison: None Technique: Gray-scale and color Doppler images were attempted of the lower saphenofemoral junction, common  femoral vein,superficial femoral vein, proximal deep femoral vein, proximal deep femoral vein, popliteal vein and posterior tibial veins. Findings: Deep Venous System: There is a thrombus with diminished flow in the common femoral vein extending into the calf veins. Most of the thrombus appears occlusive. Superficial Venous SystemNo superficial thrombophlebitis. Soft tissues: Soft tissues are unremarkable.     Extensive left-sided DVT Electronically signed by Rick Orozco      Electronically signed by Luis M Arboleda MD on 7/18/2024 at 1:11 PM

## 2024-07-18 NOTE — CONSULTS
CARDIOLOGY CONSULT NOTE         Reason for consultation: DVT      Primary care physician: No primary care provider on file.      Chief Complaints :  Chief Complaint   Patient presents with    Leg Swelling     Left leg swelling x2 days. Pt scheduled to have ultrasound of leg tomorrow but states she was having increased difficulties with mobility so her PCP sent her here. Pt gets all of her [facility] care through [facility]. Left leg more swollen than right, pt also c/o pain throughout entire left leg. Pt has not taken any medications for pain at any point.         History of present illness:Edna is a 84 y.o.year old female who is admitted with left lower extremity swelling and pain.  She was noted to have extensive DVT in left lower extremity.  We are asked to see her for aspiration thrombectomy.  Patient denies any chest discomfort.  She has mild leg pain but has significant left lower extremity swelling.    Review of Systems:     All systems negative except as marked.        Physical Examination:    Vitals:    07/18/24 0917   BP: (!) 161/130   Pulse: 86   Resp: 24   Temp:    SpO2: 94%        General Appearance:  No distress, conversant    Constitutional:  No acute distress, non-toxic appearance.    HENT:  Normocephalic, Atraumatic,   Eyes:  PERRL, EOMI, Conjunctiva normal, No discharge.   Respiratory:  No respiratory distress, No wheezing  Cardiovascular: S1, S2, no murmurs, gallops. JVD wnl  Abdomen /GI:   Soft, No tenderness   Genitourinary: No costovertebral angle tenderness   Musculoskeletal: Swelling of left lower extremity.  Integument:  Well hydrated, no rash   Neurologic:  Alert & oriented x 3, no focal deficits noted       Medical decision making and Data review:    Lab Review   Recent Labs     07/18/24  0156   WBC 6.1   HGB 10.7*   HCT 34.7*   *      Recent Labs     07/18/24  0156      K 3.6      CO2 26   BUN 15   CREATININE 1.3*     Recent Labs     07/17/24  1430   AST 23   ALT 10

## 2024-07-19 VITALS
TEMPERATURE: 98.2 F | RESPIRATION RATE: 18 BRPM | BODY MASS INDEX: 32.82 KG/M2 | SYSTOLIC BLOOD PRESSURE: 134 MMHG | HEART RATE: 103 BPM | OXYGEN SATURATION: 97 % | WEIGHT: 192.24 LBS | DIASTOLIC BLOOD PRESSURE: 60 MMHG | HEIGHT: 64 IN

## 2024-07-19 PROCEDURE — 6360000002 HC RX W HCPCS

## 2024-07-19 PROCEDURE — 2580000003 HC RX 258: Performed by: STUDENT IN AN ORGANIZED HEALTH CARE EDUCATION/TRAINING PROGRAM

## 2024-07-19 PROCEDURE — 6370000000 HC RX 637 (ALT 250 FOR IP): Performed by: STUDENT IN AN ORGANIZED HEALTH CARE EDUCATION/TRAINING PROGRAM

## 2024-07-19 PROCEDURE — 94761 N-INVAS EAR/PLS OXIMETRY MLT: CPT

## 2024-07-19 RX ADMIN — PREDNISONE 5 MG: 5 TABLET ORAL at 09:16

## 2024-07-19 RX ADMIN — LEFLUNOMIDE 10 MG: 20 TABLET ORAL at 09:16

## 2024-07-19 RX ADMIN — VALACYCLOVIR HYDROCHLORIDE 500 MG: 500 TABLET, FILM COATED ORAL at 09:16

## 2024-07-19 RX ADMIN — Medication 400 MG: at 09:16

## 2024-07-19 RX ADMIN — PANTOPRAZOLE SODIUM 40 MG: 40 TABLET, DELAYED RELEASE ORAL at 09:16

## 2024-07-19 RX ADMIN — SODIUM CHLORIDE, PRESERVATIVE FREE 10 ML: 5 INJECTION INTRAVENOUS at 09:16

## 2024-07-19 RX ADMIN — ENOXAPARIN SODIUM 90 MG: 100 INJECTION SUBCUTANEOUS at 05:36

## 2024-07-19 RX ADMIN — LEVOTHYROXINE SODIUM 150 MCG: 0.15 TABLET ORAL at 05:36

## 2024-07-19 RX ADMIN — METOPROLOL TARTRATE 50 MG: 50 TABLET, FILM COATED ORAL at 09:16

## 2024-07-19 RX ADMIN — POTASSIUM CHLORIDE 20 MEQ: 1500 TABLET, EXTENDED RELEASE ORAL at 09:16

## 2024-07-19 ASSESSMENT — PAIN SCALES - GENERAL: PAINLEVEL_OUTOF10: 0

## 2024-07-19 NOTE — PLAN OF CARE
Problem: Discharge Planning  Goal: Discharge to home or other facility with appropriate resources  7/19/2024 0112 by Derick Oconnor RN  Outcome: Progressing  7/18/2024 1232 by Sayda Pride RN  Outcome: Progressing     Problem: Pain  Goal: Verbalizes/displays adequate comfort level or baseline comfort level  7/19/2024 0112 by Derick Oconnor RN  Outcome: Progressing  7/18/2024 1232 by Sayda Pride RN  Outcome: Progressing     Problem: Safety - Adult  Goal: Free from fall injury  7/19/2024 0112 by Derick Oconnor RN  Outcome: Progressing  7/18/2024 1232 by Sayda Pride RN  Outcome: Progressing     Problem: Skin/Tissue Integrity  Goal: Absence of new skin breakdown  Description: 1.  Monitor for areas of redness and/or skin breakdown  2.  Assess vascular access sites hourly  3.  Every 4-6 hours minimum:  Change oxygen saturation probe site  4.  Every 4-6 hours:  If on nasal continuous positive airway pressure, respiratory therapy assess nares and determine need for appliance change or resting period.  7/19/2024 0112 by Derick Oconnor RN  Outcome: Progressing  7/18/2024 1232 by Sayda Pride RN  Outcome: Progressing     Problem: ABCDS Injury Assessment  Goal: Absence of physical injury  7/19/2024 0112 by Derick Oconnor RN  Outcome: Progressing  7/18/2024 1232 by Sayda Pride RN  Outcome: Progressing

## 2024-07-19 NOTE — PLAN OF CARE
Problem: Discharge Planning  Goal: Discharge to home or other facility with appropriate resources  7/19/2024 1004 by Barbi Acharya  Outcome: Adequate for Discharge  7/19/2024 0946 by Barbi Acharya  Outcome: Progressing  7/19/2024 0112 by Derick Oconnor, RN  Outcome: Progressing     Problem: Pain  Goal: Verbalizes/displays adequate comfort level or baseline comfort level  7/19/2024 1004 by Barbi Acharya  Outcome: Adequate for Discharge  7/19/2024 0946 by Barbi Acharya  Outcome: Progressing  7/19/2024 0112 by Derick Oconnor RN  Outcome: Progressing     Problem: Safety - Adult  Goal: Free from fall injury  7/19/2024 1004 by Barbi Acharya  Outcome: Adequate for Discharge  7/19/2024 0946 by Barbi Acharya  Outcome: Progressing  7/19/2024 0112 by Derick Oconnor RN  Outcome: Progressing     Problem: Skin/Tissue Integrity  Goal: Absence of new skin breakdown  Description: 1.  Monitor for areas of redness and/or skin breakdown  2.  Assess vascular access sites hourly  3.  Every 4-6 hours minimum:  Change oxygen saturation probe site  4.  Every 4-6 hours:  If on nasal continuous positive airway pressure, respiratory therapy assess nares and determine need for appliance change or resting period.  7/19/2024 1004 by Barbi Acharya  Outcome: Adequate for Discharge  7/19/2024 0946 by Barbi Acharya  Outcome: Progressing  7/19/2024 0112 by Derick Oconnor, RN  Outcome: Progressing     Problem: ABCDS Injury Assessment  Goal: Absence of physical injury  7/19/2024 1004 by Barbi Acharya  Outcome: Adequate for Discharge  7/19/2024 0946 by Barbi Acharya  Outcome: Progressing  7/19/2024 0112 by Derick Oconnor RN  Outcome: Progressing

## 2024-07-19 NOTE — PLAN OF CARE
Problem: Discharge Planning  Goal: Discharge to home or other facility with appropriate resources  7/19/2024 0946 by Barbi Acharya  Outcome: Progressing  7/19/2024 0112 by Derick Oconnor RN  Outcome: Progressing     Problem: Pain  Goal: Verbalizes/displays adequate comfort level or baseline comfort level  7/19/2024 0946 by Barbi Acharya  Outcome: Progressing  7/19/2024 0112 by Derick Oconnor RN  Outcome: Progressing     Problem: Safety - Adult  Goal: Free from fall injury  7/19/2024 0946 by Barbi Acharya  Outcome: Progressing  7/19/2024 0112 by Derick Oconnor RN  Outcome: Progressing     Problem: Skin/Tissue Integrity  Goal: Absence of new skin breakdown  Description: 1.  Monitor for areas of redness and/or skin breakdown  2.  Assess vascular access sites hourly  3.  Every 4-6 hours minimum:  Change oxygen saturation probe site  4.  Every 4-6 hours:  If on nasal continuous positive airway pressure, respiratory therapy assess nares and determine need for appliance change or resting period.  7/19/2024 0946 by Barbi Acharya  Outcome: Progressing  7/19/2024 0112 by Derick Oconnor RN  Outcome: Progressing     Problem: ABCDS Injury Assessment  Goal: Absence of physical injury  7/19/2024 0946 by Barbi Acharya  Outcome: Progressing  7/19/2024 0112 by Derick Oconnor RN  Outcome: Progressing

## 2024-07-19 NOTE — DISCHARGE INSTR - DIET

## 2024-07-22 ENCOUNTER — OFFICE VISIT (OUTPATIENT)
Dept: FAMILY MEDICINE CLINIC | Age: 84
End: 2024-07-22

## 2024-07-22 VITALS
OXYGEN SATURATION: 96 % | HEART RATE: 68 BPM | DIASTOLIC BLOOD PRESSURE: 62 MMHG | BODY MASS INDEX: 33.99 KG/M2 | SYSTOLIC BLOOD PRESSURE: 112 MMHG | TEMPERATURE: 97.4 F | WEIGHT: 198 LBS

## 2024-07-22 DIAGNOSIS — I82.402 LEG DVT (DEEP VENOUS THROMBOEMBOLISM), ACUTE, LEFT (HCC): Primary | ICD-10-CM

## 2024-07-22 DIAGNOSIS — Z09 HOSPITAL DISCHARGE FOLLOW-UP: ICD-10-CM

## 2024-07-22 PROBLEM — D69.6 THROMBOCYTOPENIA, UNSPECIFIED (HCC): Status: ACTIVE | Noted: 2024-07-22

## 2024-07-22 RX ORDER — CIPROFLOXACIN HYDROCHLORIDE 3.5 MG/ML
SOLUTION/ DROPS TOPICAL
COMMUNITY
Start: 2017-01-09 | End: 2024-07-22

## 2024-07-22 RX ORDER — ACETAMINOPHEN 500 MG
1000 TABLET ORAL EVERY 6 HOURS PRN
COMMUNITY

## 2024-07-22 ASSESSMENT — ENCOUNTER SYMPTOMS
DIARRHEA: 0
SHORTNESS OF BREATH: 0
SINUS PRESSURE: 0
COUGH: 0
VOMITING: 0
SINUS PAIN: 0
NAUSEA: 0
RHINORRHEA: 0
CHEST TIGHTNESS: 0
WHEEZING: 0
SORE THROAT: 0

## 2024-07-22 NOTE — PROGRESS NOTES
chart.    Interval history/Current status: Stable    Patient Active Problem List   Diagnosis    Leg DVT (deep venous thromboembolism), acute, left (HCC)    Thrombocytopenia, unspecified (HCC)       Medications listed as ordered at the time of discharge from hospital     Medication List            Accurate as of July 22, 2024  4:20 PM. If you have any questions, ask your nurse or doctor.                CONTINUE taking these medications      acetaminophen 500 MG tablet  Commonly known as: TYLENOL     apixaban 5 MG Tabs tablet  Commonly known as: Eliquis  Take 1 tablet by mouth 2 times daily     furosemide 20 MG tablet  Commonly known as: LASIX     leflunomide 20 MG tablet  Commonly known as: ARAVA     magnesium oxide 400 (240 Mg) MG tablet  Commonly known as: MAG-OX     metoprolol tartrate 50 MG tablet  Commonly known as: LOPRESSOR     pantoprazole 40 MG tablet  Commonly known as: PROTONIX     potassium chloride 20 MEQ extended release tablet  Commonly known as: KLOR-CON M     predniSONE 2.5 MG tablet  Commonly known as: DELTASONE     Synthroid 150 MCG tablet  Generic drug: levothyroxine     valACYclovir 500 MG tablet  Commonly known as: VALTREX                Medications marked \"taking\" at this time  Outpatient Medications Marked as Taking for the 7/22/24 encounter (Office Visit) with Pierre Corcoran APRN - CNP   Medication Sig Dispense Refill    acetaminophen (TYLENOL) 500 MG tablet Take 2 tablets by mouth every 6 hours as needed      apixaban (ELIQUIS) 5 MG TABS tablet Take 1 tablet by mouth 2 times daily 180 tablet 0    magnesium oxide (MAG-OX) 400 (240 Mg) MG tablet Take 1 tablet by mouth 2 times daily      furosemide (LASIX) 20 MG tablet Take 1 tablet by mouth daily as needed (weight gain / swelling)      potassium chloride (KLOR-CON M) 20 MEQ extended release tablet Take 1 tablet by mouth 2 times daily      predniSONE (DELTASONE) 2.5 MG tablet Take 2 tablets by mouth daily      valACYclovir (VALTREX) 500 MG

## 2024-07-29 NOTE — PROGRESS NOTES
Physician Progress Note      PATIENT:               JUS   CSN #:                  [id number]  :                       1940  ADMIT DATE:       2024 1:50 PM  DISCH DATE:        2024 10:28 AM  RESPONDING  PROVIDER #:        Luis M Cuadra MD          QUERY TEXT:    Patient admitted with DVT Left femoral vein. Noted documentation of acute   respiratory failure in the H&P and PN . In order to support the diagnosis   of acute respiratory failure, please include additional clinical indicators in   your documentation.  Or please document if the diagnosis of acute respiratory   failure has been ruled out after further study.    The medical record reflects the following:  Risk Factors:  DVT Left femoral vein  Clinical Indicators: 94-93% on room air, placed on 2LNC 94-98%,   ED-Respiratory:  Lungs clear to auscultation bilaterally.  Respirations   nonlabored.  Pulmonary: Effort: Pulmonary effort is normal.  Breath sounds:   Normal breath sounds. No wheezing, rhonchi or rales. Noted in the H&P and PN   : Acute hypoxemic respiratory failure- Denied any SOB, presyncope,   syncope, CP, orthopnea or PND.- Requiring 2L on arrival with mild tachycardia.  Treatment: 2LNC -Room air, Pulsox, No VBGs noted    Thank you, Emma Mckeon RN, CDS [phone]    Acute Respiratory Failure Clinical Indicators per [employer] Training Guide and   Quick Reference Guide:  pO2 < 60 mmHg or SpO2 (pulse oximetry) < 91% breathing room air  pCO2 > 50 and pH < 7.35  P/F ratio (pO2 / FIO2) < 300  pO2 decrease or pCO2 increase by 10 mmHg from baseline (if known)  Supplemental oxygen of 40% or more  Presence of respiratory distress, tachypnea, dyspnea, shortness of breath,   wheezing  Unable to speak in complete sentences  Use of accessory muscles to breathe  Extreme anxiety and feeling of impending doom  Tripod position  Confusion/altered mental status/obtunded  Options provided:  -- Acute Respiratory Failure as evidenced

## 2024-08-19 ENCOUNTER — OFFICE VISIT (OUTPATIENT)
Dept: CARDIOLOGY CLINIC | Age: 84
End: 2024-08-19
Payer: MEDICARE

## 2024-08-19 VITALS
DIASTOLIC BLOOD PRESSURE: 66 MMHG | SYSTOLIC BLOOD PRESSURE: 128 MMHG | BODY MASS INDEX: 33.97 KG/M2 | HEIGHT: 64 IN | WEIGHT: 199 LBS | HEART RATE: 75 BPM

## 2024-08-19 DIAGNOSIS — N18.30 STAGE 3 CHRONIC KIDNEY DISEASE, UNSPECIFIED WHETHER STAGE 3A OR 3B CKD (HCC): ICD-10-CM

## 2024-08-19 DIAGNOSIS — I82.402 LEG DVT (DEEP VENOUS THROMBOEMBOLISM), ACUTE, LEFT (HCC): Primary | ICD-10-CM

## 2024-08-19 DIAGNOSIS — I10 PRIMARY HYPERTENSION: ICD-10-CM

## 2024-08-19 DIAGNOSIS — K21.00 GASTROESOPHAGEAL REFLUX DISEASE WITH ESOPHAGITIS WITHOUT HEMORRHAGE: ICD-10-CM

## 2024-08-19 DIAGNOSIS — E03.2 HYPOTHYROIDISM DUE TO NON-MEDICATION EXOGENOUS SUBSTANCES: ICD-10-CM

## 2024-08-19 PROCEDURE — 1090F PRES/ABSN URINE INCON ASSESS: CPT | Performed by: INTERNAL MEDICINE

## 2024-08-19 PROCEDURE — 3074F SYST BP LT 130 MM HG: CPT | Performed by: INTERNAL MEDICINE

## 2024-08-19 PROCEDURE — 1036F TOBACCO NON-USER: CPT | Performed by: INTERNAL MEDICINE

## 2024-08-19 PROCEDURE — 3078F DIAST BP <80 MM HG: CPT | Performed by: INTERNAL MEDICINE

## 2024-08-19 PROCEDURE — G8427 DOCREV CUR MEDS BY ELIG CLIN: HCPCS | Performed by: INTERNAL MEDICINE

## 2024-08-19 PROCEDURE — G8400 PT W/DXA NO RESULTS DOC: HCPCS | Performed by: INTERNAL MEDICINE

## 2024-08-19 PROCEDURE — 99214 OFFICE O/P EST MOD 30 MIN: CPT | Performed by: INTERNAL MEDICINE

## 2024-08-19 PROCEDURE — G8417 CALC BMI ABV UP PARAM F/U: HCPCS | Performed by: INTERNAL MEDICINE

## 2024-08-19 PROCEDURE — 1123F ACP DISCUSS/DSCN MKR DOCD: CPT | Performed by: INTERNAL MEDICINE

## 2024-08-19 PROCEDURE — 93000 ELECTROCARDIOGRAM COMPLETE: CPT | Performed by: INTERNAL MEDICINE

## 2024-08-19 NOTE — PATIENT INSTRUCTIONS
We are committed to providing you the best care possible.    If you receive a survey after visiting one of our offices, please take time to share your experience concerning your physician office visit.  These surveys are confidential and no health information about you is shared.    We are eager to improve for you and we are counting on your feedback to help make that happen.      **It is YOUR responsibilty to bring medication bottles and/or updated medication list to EACH APPOINTMENT. This will allow us to better serve you and all your healthcare needs**  Thank you for allowing us to care for you today!   We want to ensure we can follow your treatment plan and we strive to give you the best outcomes and experience possible.   If you ever have a life threatening emergency and call 911 - for an ambulance (EMS)   Our providers can only care for you at:   [facility] or [facility].   Even if you have someone take you or you drive yourself we can only care for you in a [facility] facility. Our providers are not setup at the other healthcare locations!   Please be informed that if you contact our office outside of normal business hours the physician on call cannot help with any scheduling or rescheduling issues, procedure instruction questions or any type of medication issue.    We advise you for any urgent/emergency that you go to the nearest emergency room!    PLEASE CALL OUR OFFICE DURING NORMAL BUSINESS HOURS    Monday - Friday   8 am to 5 pm    [place]: [phone]    [place]: [phone]    [place]:  [phone]

## 2024-08-19 NOTE — PROGRESS NOTES
Margarito Booker MD        OFFICE  FOLLOWUP NOTE    Chief complaints: patient is here for management of left leg DVT, HTN, RA, hypothyroidism, gerd    S/p left leg venogram and thrombectomy for DVT    Subjective: patient feels better, no chest pain, no shortness of breath, no dizziness, no palpitations    HPI Edna is a 84 y.o.year old who  has a past medical history of Hypertension, Multiple myeloma (HCC), Swelling, and Thyroid disease. and presents for management of CAD, DM, HTN, AFIB, which are well controlled      Current Outpatient Medications   Medication Sig Dispense Refill    acetaminophen (TYLENOL) 500 MG tablet Take 2 tablets by mouth every 6 hours as needed      apixaban (ELIQUIS) 5 MG TABS tablet Take 1 tablet by mouth 2 times daily 180 tablet 0    metoprolol tartrate (LOPRESSOR) 50 MG tablet Take 1 tablet by mouth 2 times daily      magnesium oxide (MAG-OX) 400 (240 Mg) MG tablet Take 1 tablet by mouth 2 times daily      furosemide (LASIX) 20 MG tablet Take 1 tablet by mouth daily as needed (weight gain / swelling)      potassium chloride (KLOR-CON M) 20 MEQ extended release tablet Take 1 tablet by mouth 2 times daily      predniSONE (DELTASONE) 2.5 MG tablet Take 2 tablets by mouth daily      valACYclovir (VALTREX) 500 MG tablet Take 1 tablet by mouth daily      pantoprazole (PROTONIX) 40 MG tablet Take 1 tablet by mouth daily      SYNTHROID 150 MCG tablet Take 1 tablet by mouth Daily      leflunomide (ARAVA) 20 MG tablet Take 0.5 tablets by mouth daily       No current facility-administered medications for this visit.     Allergies: Ampicillin, Meperidine, Meperidine hcl, Other, Methotrexate, and Sulfa antibiotics  Past Medical History:   Diagnosis Date    Hypertension     Multiple myeloma (HCC)     Swelling     Thyroid disease      Past Surgical History:   Procedure Laterality Date    APPENDECTOMY      CHOLECYSTECTOMY      COLON SURGERY      HYSTERECTOMY (CERVIX STATUS UNKNOWN)

## 2024-10-24 ENCOUNTER — TELEPHONE (OUTPATIENT)
Dept: CARDIOLOGY CLINIC | Age: 84
End: 2024-10-24

## 2024-10-24 NOTE — TELEPHONE ENCOUNTER
Cardiologist: Dr. Ha  Surgeon: Dr. Simons  Surgery: Ray resection right ring finger  Surgery/Procedure should be done in the hospital setting    _____ yes    _____  no    Anesthesia: regional  Date: 10/31/2024  Fax# [phone]  # [phone]    Last OV 8/19/2024 w/Jhony    Left DVT: s/p venogram and thrombectomy by Dr. Ha, will refill eliquis at [facility], will recommend to SEE Dr. Ha, START using compression sock, leg exercise  Multiple myeloma: stable  CKD: stable  RA; stable: contineu arava and prednisone  Hypothyropidism: continue synthroid  Gerd: stable      Last EKG- 8/19/2024      NM- none    Echo-7/18/2024   Image quality is adequate.    Left Ventricle: Normal left ventricular systolic function with a visually estimated EF of 55 - 60%. Mildly increased wall thickness.    Aortic Valve: Calcification noted on the right coronary cusp of the aortic valve.    Mitral Valve: Annular calcification. Posterior leaflet appears calcified with restricted motion.    Right Ventricle: Right ventricle appears moderately dilated.    Pericardium: There is evidence of prominent epicardial fat. No pericardial effusion.       Eliquis

## 2024-10-27 ENCOUNTER — HOSPITAL ENCOUNTER (EMERGENCY)
Age: 84
Discharge: HOME | End: 2024-10-27
Attending: STUDENT IN AN ORGANIZED HEALTH CARE EDUCATION/TRAINING PROGRAM
Payer: MEDICARE

## 2024-10-27 VITALS
DIASTOLIC BLOOD PRESSURE: 130 MMHG | RESPIRATION RATE: 20 BRPM | HEIGHT: 64 IN | HEART RATE: 71 BPM | TEMPERATURE: 97.2 F | WEIGHT: 200 LBS | BODY MASS INDEX: 34.15 KG/M2 | SYSTOLIC BLOOD PRESSURE: 198 MMHG | OXYGEN SATURATION: 96 %

## 2024-10-27 DIAGNOSIS — N30.00 ACUTE CYSTITIS WITHOUT HEMATURIA: Primary | ICD-10-CM

## 2024-10-27 LAB
BACTERIA URNS QL MICRO: ABNORMAL
BILIRUB UR QL STRIP: NEGATIVE
CLARITY UR: ABNORMAL
COLOR UR: YELLOW
EPI CELLS #/AREA URNS HPF: ABNORMAL /HPF
GLUCOSE UR STRIP-MCNC: NEGATIVE MG/DL
HGB UR QL STRIP.AUTO: ABNORMAL
KETONES UR STRIP-MCNC: NEGATIVE MG/DL
LEUKOCYTE ESTERASE UR QL STRIP: ABNORMAL
NITRITE UR QL STRIP: POSITIVE
PH UR STRIP: 6.5 [PH] (ref 5–8)
PROT UR STRIP-MCNC: 100 MG/DL
RBC #/AREA URNS HPF: ABNORMAL /HPF
SP GR UR STRIP: 1.01 (ref 1–1.03)
UROBILINOGEN UR STRIP-ACNC: 0.2 EU/DL (ref 0–1)
WBC #/AREA URNS HPF: ABNORMAL /HPF

## 2024-10-27 PROCEDURE — 99283 EMERGENCY DEPT VISIT LOW MDM: CPT

## 2024-10-27 PROCEDURE — 6370000000 HC RX 637 (ALT 250 FOR IP): Performed by: STUDENT IN AN ORGANIZED HEALTH CARE EDUCATION/TRAINING PROGRAM

## 2024-10-27 PROCEDURE — 81001 URINALYSIS AUTO W/SCOPE: CPT

## 2024-10-27 RX ORDER — CEPHALEXIN 500 MG/1
1000 CAPSULE ORAL 2 TIMES DAILY
Qty: 40 CAPSULE | Refills: 0 | Status: SHIPPED | OUTPATIENT
Start: 2024-10-27 | End: 2024-11-06

## 2024-10-27 RX ORDER — ACETAMINOPHEN 500 MG
1000 TABLET ORAL
Status: COMPLETED | OUTPATIENT
Start: 2024-10-27 | End: 2024-10-27

## 2024-10-27 RX ADMIN — CEPHALEXIN 500 MG: 250 CAPSULE ORAL at 10:59

## 2024-10-27 RX ADMIN — ACETAMINOPHEN 1000 MG: 500 TABLET ORAL at 10:59

## 2024-10-27 ASSESSMENT — PAIN DESCRIPTION - PAIN TYPE: TYPE: ACUTE PAIN

## 2024-10-27 ASSESSMENT — PAIN DESCRIPTION - DESCRIPTORS: DESCRIPTORS: ACHING

## 2024-10-27 ASSESSMENT — PAIN DESCRIPTION - ORIENTATION: ORIENTATION: RIGHT

## 2024-10-27 ASSESSMENT — PAIN SCALES - GENERAL: PAINLEVEL_OUTOF10: 10

## 2024-10-27 ASSESSMENT — PAIN DESCRIPTION - LOCATION: LOCATION: FLANK

## 2024-10-27 NOTE — ED PROVIDER NOTES
and spelling, as well as words and phrases that may be inappropriate.  Efforts were made to edit the dictations.        Casey Reyes DO  10/27/24 110

## 2024-10-27 NOTE — DISCHARGE INSTR - COC
Continuity of Care Form    Patient Name: Edna Luu   :  1940  MRN:  [id number]    Admit date:  10/27/2024  Discharge date:  ***    Code Status Order: Prior   Advance Directives:   Advance Care Flowsheet Documentation             Admitting Physician:  No admitting provider for patient encounter.  PCP: Bob Abraham MD    Discharging Nurse: ***  Discharging Hospital Unit/Room#: ED-10/E10  Discharging Unit Phone Number: ***    Emergency Contact:   Extended Emergency Contact Information  Primary Emergency Contact: FELICITA LUU  Mobile Phone: [phone]  Relation: Spouse    Past Surgical History:  Past Surgical History:   Procedure Laterality Date    APPENDECTOMY      CHOLECYSTECTOMY      COLON SURGERY      HYSTERECTOMY (CERVIX STATUS UNKNOWN)      INVASIVE VASCULAR N/A 2024    Venogram lower ext bilat performed by Nazario Ha MD at [facility] CARDIAC CATH LAB       Immunization History:   Immunization History   Administered Date(s) Administered    COVID-19, PFIZER GRAY top, DO NOT Dilute, (age 12 y+), IM, 30 mcg/0.3 mL 2022    COVID-19, PFIZER PURPLE top, DILUTE for use, (age 12 y+), 30mcg/0.3mL 2021, 2021, 2022    COVID-19, PFIZER, (age 12y+), IM, 30mcg/0.3mL 2023       Active Problems:  Patient Active Problem List   Diagnosis Code    Leg DVT (deep venous thromboembolism), acute, left ([facility]) I82.402    Thrombocytopenia, unspecified ([facility]) D69.6       Isolation/Infection:   Isolation            No Isolation          Patient Infection Status       None to display            Nurse Assessment:  Last Vital Signs: BP (!) 198/130   Pulse 71   Temp 97.2 °F (36.2 °C) (Temporal)   Resp 20   Ht 1.626 m (5' 4\")   Wt 90.7 kg (200 lb)   SpO2 96%   BMI 34.33 kg/m²     Last documented pain score (0-10 scale): Pain Level: 10  Last Weight:   Wt Readings from Last 1 Encounters:   10/27/24 90.7 kg (200 lb)     Mental Status:  {IP PT MENTAL STATUS:}    IV Access:  { GERARDO IV

## 2024-10-27 NOTE — ED NOTES
Discharge instructions and prescriptions were reviewed and the patient will follow up with Dr. Abraham. The patient voiced understanding.

## 2024-10-27 NOTE — ED TRIAGE NOTES
Patient says she has flank pain that started a few days ago. Says it has been getting worse. Has had some pain and burning with urination.

## 2024-10-28 DIAGNOSIS — Z01.810 PRE-OPERATIVE CARDIOVASCULAR EXAMINATION: Primary | ICD-10-CM

## 2024-10-29 ENCOUNTER — TELEPHONE (OUTPATIENT)
Dept: CARDIOLOGY CLINIC | Age: 84
End: 2024-10-29

## 2024-10-29 NOTE — TELEPHONE ENCOUNTER
Tried to call patient to talk with her about her testing. No answer/No VM    If patient calls please schedule her for a treadmill. Needs this for clearance

## 2024-10-29 NOTE — TELEPHONE ENCOUNTER
Left a VM  with Anika MAC at Dr Simons office. Stating I can not get a hold of June to schedule a treadmill for clearance surgery 10/31/24.    When patient calls please schedule

## 2024-11-20 ENCOUNTER — OFFICE VISIT (OUTPATIENT)
Dept: CARDIOLOGY CLINIC | Age: 84
End: 2024-11-20

## 2024-11-20 VITALS
SYSTOLIC BLOOD PRESSURE: 132 MMHG | WEIGHT: 200 LBS | BODY MASS INDEX: 34.15 KG/M2 | DIASTOLIC BLOOD PRESSURE: 74 MMHG | HEART RATE: 80 BPM | HEIGHT: 64 IN

## 2024-11-20 DIAGNOSIS — R60.0 EDEMA OF LOWER EXTREMITY: ICD-10-CM

## 2024-11-20 DIAGNOSIS — I10 ESSENTIAL HYPERTENSION: ICD-10-CM

## 2024-11-20 DIAGNOSIS — Z86.718 HISTORY OF DVT (DEEP VEIN THROMBOSIS): Primary | ICD-10-CM

## 2024-11-20 RX ORDER — CALCIUM CARBONATE 500(1250)
500 TABLET ORAL DAILY
COMMUNITY

## 2024-11-20 RX ORDER — FUROSEMIDE 20 MG/1
20 TABLET ORAL DAILY
Qty: 90 TABLET | Refills: 1 | Status: SHIPPED | OUTPATIENT
Start: 2024-11-20

## 2024-11-20 NOTE — PROGRESS NOTES
Nazario Ha MD                                  CARDIOLOGY  NOTE          Chief Complaint:    Chief Complaint   Patient presents with    Follow-up     PT states that she has bilat edema no surgeries or procedures scheduled         HPI:     Edna is a 84 y.o. year old female who was evaluated in July 2024 for DVT status post aspiration thrombectomy.  Patient has prior medical history significant for multiple myeloma, chronic kidney failure, rheumatoid arthritis, hypothyroidism and GERD.    Patient is here to establish care and follow-up.      DVT : Aspiration thrombectomy 7/18/2024    1- Lt popliteal venous access under ultrasound  2- IVC venogram  3- Lt common iliac / femoral venogram  6- Aspiration Thrombectomy of  Lt. Brigitte system  7. Venoplasty of Left  CFV    ECHO        Image quality is adequate.    Left Ventricle: Normal left ventricular systolic function with a visually estimated EF of 55 - 60%. Mildly increased wall thickness.    Aortic Valve: Calcification noted on the right coronary cusp of the aortic valve.    Mitral Valve: Annular calcification. Posterior leaflet appears calcified with restricted motion.    Right Ventricle: Right ventricle appears moderately dilated.    Pericardium: There is evidence of prominent epicardial fat. No pericardial effusion.        Current Outpatient Medications   Medication Sig Dispense Refill    calcium carbonate (OSCAL) 500 MG TABS tablet Take 1 tablet by mouth daily      furosemide (LASIX) 20 MG tablet Take 1 tablet by mouth daily 90 tablet 1    Compression Stockings MISC by Does not apply route 1 each 0    apixaban (ELIQUIS) 5 MG TABS tablet Take 1 tablet by mouth 2 times daily 180 tablet 3    acetaminophen (TYLENOL) 500 MG tablet Take 2 tablets by mouth every 6 hours as needed      metoprolol tartrate (LOPRESSOR) 50 MG tablet Take 1 tablet by mouth 2 times daily      magnesium oxide (MAG-OX) 400 (240 Mg) MG tablet Take 1 tablet by mouth 2 times daily

## 2024-11-22 ENCOUNTER — HOSPITAL ENCOUNTER (EMERGENCY)
Age: 84
Discharge: ANOTHER HOSPITAL | End: 2024-11-22
Attending: EMERGENCY MEDICINE
Payer: MEDICARE

## 2024-11-22 VITALS
HEIGHT: 64 IN | WEIGHT: 186 LBS | RESPIRATION RATE: 21 BRPM | TEMPERATURE: 97.9 F | SYSTOLIC BLOOD PRESSURE: 99 MMHG | DIASTOLIC BLOOD PRESSURE: 59 MMHG | HEART RATE: 89 BPM | OXYGEN SATURATION: 90 % | BODY MASS INDEX: 31.76 KG/M2

## 2024-11-22 DIAGNOSIS — R04.0 EPISTAXIS: Primary | ICD-10-CM

## 2024-11-22 LAB
ALBUMIN SERPL-MCNC: 3.1 G/DL (ref 3.4–5)
ALBUMIN/GLOB SERPL: 1 {RATIO} (ref 1.1–2.2)
ALP SERPL-CCNC: 139 U/L (ref 40–129)
ALT SERPL-CCNC: 14 U/L (ref 10–40)
ANION GAP SERPL CALCULATED.3IONS-SCNC: 12 MMOL/L (ref 4–16)
AST SERPL-CCNC: 19 U/L (ref 15–37)
BASOPHILS # BLD: 0.05 K/UL
BASOPHILS NFR BLD: 1 % (ref 0–1)
BILIRUB SERPL-MCNC: 0.5 MG/DL (ref 0–1)
BUN SERPL-MCNC: 23 MG/DL (ref 6–23)
CALCIUM SERPL-MCNC: 9 MG/DL (ref 8.3–10.6)
CHLORIDE SERPL-SCNC: 107 MMOL/L (ref 99–110)
CO2 SERPL-SCNC: 22 MMOL/L (ref 21–32)
CREAT SERPL-MCNC: 1.3 MG/DL (ref 0.6–1.1)
EOSINOPHIL # BLD: 0.03 K/UL
EOSINOPHILS RELATIVE PERCENT: 0 % (ref 0–3)
ERYTHROCYTE [DISTWIDTH] IN BLOOD BY AUTOMATED COUNT: 15.7 % (ref 11.7–14.9)
GFR, ESTIMATED: 40 ML/MIN/1.73M2
GLUCOSE SERPL-MCNC: 158 MG/DL (ref 70–99)
HCT VFR BLD AUTO: 36.2 % (ref 37–47)
HGB BLD-MCNC: 11.1 G/DL (ref 12.5–16)
IMM GRANULOCYTES # BLD AUTO: 0.04 K/UL
IMM GRANULOCYTES NFR BLD: 0 %
LYMPHOCYTES NFR BLD: 2.41 K/UL
LYMPHOCYTES RELATIVE PERCENT: 27 % (ref 24–44)
MCH RBC QN AUTO: 31.6 PG (ref 27–31)
MCHC RBC AUTO-ENTMCNC: 30.7 G/DL (ref 32–36)
MCV RBC AUTO: 103.1 FL (ref 78–100)
MONOCYTES NFR BLD: 0.72 K/UL
MONOCYTES NFR BLD: 8 % (ref 0–4)
NEUTROPHILS NFR BLD: 64 % (ref 36–66)
NEUTS SEG NFR BLD: 5.81 K/UL
PLATELET # BLD AUTO: 178 K/UL (ref 140–440)
PMV BLD AUTO: 11.9 FL (ref 7.5–11.1)
POTASSIUM SERPL-SCNC: 5 MMOL/L (ref 3.5–5.1)
PROT SERPL-MCNC: 6.2 G/DL (ref 6.4–8.2)
RBC # BLD AUTO: 3.51 M/UL (ref 4.2–5.4)
SODIUM SERPL-SCNC: 141 MMOL/L (ref 135–145)
WBC OTHER # BLD: 9.1 K/UL (ref 4–10.5)

## 2024-11-22 PROCEDURE — 99285 EMERGENCY DEPT VISIT HI MDM: CPT

## 2024-11-22 PROCEDURE — 80053 COMPREHEN METABOLIC PANEL: CPT

## 2024-11-22 PROCEDURE — 85025 COMPLETE CBC W/AUTO DIFF WBC: CPT

## 2024-11-22 PROCEDURE — 30905 CONTROL OF NOSEBLEED: CPT

## 2024-11-22 PROCEDURE — 30903 CONTROL OF NOSEBLEED: CPT

## 2024-11-22 PROCEDURE — 6370000000 HC RX 637 (ALT 250 FOR IP): Performed by: EMERGENCY MEDICINE

## 2024-11-22 RX ORDER — OXYMETAZOLINE HYDROCHLORIDE 0.05 G/100ML
2 SPRAY NASAL ONCE
Status: COMPLETED | OUTPATIENT
Start: 2024-11-22 | End: 2024-11-22

## 2024-11-22 RX ADMIN — OXYMETAZOLINE HCL 2 SPRAY: 0.05 SPRAY NASAL at 13:07

## 2024-11-22 ASSESSMENT — ENCOUNTER SYMPTOMS
RESPIRATORY NEGATIVE: 1
EYES NEGATIVE: 1
GASTROINTESTINAL NEGATIVE: 1
ALLERGIC/IMMUNOLOGIC NEGATIVE: 1

## 2024-11-22 ASSESSMENT — PAIN - FUNCTIONAL ASSESSMENT: PAIN_FUNCTIONAL_ASSESSMENT: NONE - DENIES PAIN

## 2024-11-22 NOTE — ED NOTES
Pt onto bedpan, approx 100ml clear yellow out. Pt cleaned and changed into gown. Old blood washed from hands, face and chest. Pt repositioned onto left side for comfort, as she has pressure wound to midline buttocks. Pillows used for propping, pt voiced comfort at this time

## 2024-11-22 NOTE — ED NOTES
Attempted to call report to phone number provided by transfer center. Phone rang for several minutes before disconnecting on its own. Will reattempt.

## 2024-11-22 NOTE — ED NOTES
Pt and  updated on transfer location and ETA for transport. Pt then assisted onto bedpan, approx 100ml out clear yellow. Pt repositioned onto right side with pillows propping for comfort.

## 2024-11-22 NOTE — ED NOTES
Attempted again to call report to [facility]. Nurse states she will call back in a few minutes to get report.

## 2024-11-22 NOTE — ED PROVIDER NOTES
Take 1 tablet by mouth 2 times daily      predniSONE (DELTASONE) 2.5 MG tablet Take 2 tablets by mouth daily      valACYclovir (VALTREX) 500 MG tablet Take 1 tablet by mouth daily      pantoprazole (PROTONIX) 40 MG tablet Take 1 tablet by mouth daily      SYNTHROID 150 MCG tablet Take 1 tablet by mouth Daily      leflunomide (ARAVA) 20 MG tablet Take 0.5 tablets by mouth daily         Nursing Notes Reviewed    VITAL SIGNS:  ED Triage Vitals [11/22/24 1301]   Encounter Vitals Group      BP (!) 162/112      Systolic BP Percentile       Diastolic BP Percentile       Pulse 99      Respirations 18      Temp 97.9 °F (36.6 °C)      Temp Source Temporal      SpO2 94 %      Weight - Scale 84.4 kg (186 lb)      Height 1.626 m (5' 4\")      Head Circumference       Peak Flow       Pain Score       Pain Loc       Pain Education       Exclude from Growth Chart        PHYSICAL EXAM:  Physical Exam  Vitals and nursing note reviewed.   Constitutional:       General: She is not in acute distress.     Appearance: Normal appearance. She is well-developed and well-groomed. She is not ill-appearing, toxic-appearing or diaphoretic.   HENT:      Head: Normocephalic and atraumatic.      Nose: Congestion present. No signs of injury or laceration.      Right Nostril: Epistaxis present. No foreign body, septal hematoma or occlusion.      Left Nostril: Epistaxis present. No foreign body, septal hematoma or occlusion.      Comments: Bleeding does appear to be coming down the posterior side of her left nares.  Does not appear to be anterior     Mouth/Throat:      Mouth: No injury, lacerations, oral lesions or angioedema.      Pharynx: Uvula midline. No pharyngeal swelling, oropharyngeal exudate, posterior oropharyngeal erythema or uvula swelling.      Tonsils: No tonsillar exudate or tonsillar abscesses.        Comments: Dried blood pharynx from nose bleed  Eyes:      General: No scleral icterus.        Right eye: No discharge.         Left eye:  and I do think she needs observation for monitoring given possible vagal nerve involvement overnight.  I do not have ENT here freestanding ER I do not have ENT at [place].  She is requesting transfer to [place] for observation.  Again she is on Eliquis she may need intervention I did order basic lab work and contacted [facility].  She still has some bleeding on the right nares now again wrapping around.  Afrin was inserted on the side as well trying to hold off on the double nasal packing at this time.  Did talk to the transfer center [facility] is the only one with ENT they have a 2-day waiting list.  At this time will consult [facility] patient is okay with this plan.  Recheck patient she is doing better bleeding seems to have come down a lot and hemoglobin stable at 11.1.  Again given posterior packing she was kept on telemetry, pulse ox.  Currently hemodynamically stable.  I did talk to the physician at [facility] they graciously accepted the transfer for monitoring patient transferred.    CLINICAL IMPRESSION:  Final diagnoses:   Epistaxis       (Please note that portions of this note may have been completed with a voice recognition program. Efforts were made to edit the dictations but occasionally words aremis-transcribed.)    DISPOSITION REFERRAL (if applicable):  Bob Abraham MD  [address] [facility] [address] [phone]    Schedule an appointment as soon as possible for a visit in 1 day      [facility]  [address] [facility] [address] [phone]    If symptoms worsen    Chance Gomez MD  [address] [facility] [address] [phone]    Schedule an appointment as soon as possible for a visit in 1 day  ENT      DISPOSITION MEDICATIONS (if applicable):  New Prescriptions    No medications on file          DO Opal Monroe James, DO  11/22/24 8068

## 2024-11-22 NOTE — ED NOTES
Pt to exit via [facility] stretcher. Belonging sent home with  (gait belt and shirt)   n/a complains of pain/discomfort

## 2024-12-03 ENCOUNTER — TELEPHONE (OUTPATIENT)
Dept: CARDIOLOGY CLINIC | Age: 84
End: 2024-12-03

## 2025-01-08 ENCOUNTER — TELEPHONE (OUTPATIENT)
Dept: CARDIOLOGY CLINIC | Age: 85
End: 2025-01-08

## 2025-01-08 NOTE — TELEPHONE ENCOUNTER
Kavon goetz [facility] called patient  was in [facility]   Back in November for a nose bleed and her Eliquis   Was stopped , she did not franca the office , also   Indicated that her legs are swollen .

## 2025-02-27 ENCOUNTER — APPOINTMENT (OUTPATIENT)
Dept: GENERAL RADIOLOGY | Age: 85
End: 2025-02-27
Payer: MEDICARE

## 2025-02-27 ENCOUNTER — HOSPITAL ENCOUNTER (OUTPATIENT)
Age: 85
Setting detail: OBSERVATION
Discharge: HOME HEALTH | End: 2025-03-02
Attending: STUDENT IN AN ORGANIZED HEALTH CARE EDUCATION/TRAINING PROGRAM
Payer: MEDICARE

## 2025-02-27 DIAGNOSIS — Z79.01 CHRONIC ANTICOAGULATION: ICD-10-CM

## 2025-02-27 DIAGNOSIS — E83.42 HYPOMAGNESEMIA: Primary | ICD-10-CM

## 2025-02-27 DIAGNOSIS — R53.1 GENERALIZED WEAKNESS: ICD-10-CM

## 2025-02-27 LAB
ALBUMIN SERPL-MCNC: 2.7 G/DL (ref 3.4–5)
ALBUMIN/GLOB SERPL: 1.1 {RATIO} (ref 1.1–2.2)
ALP SERPL-CCNC: 107 U/L (ref 40–129)
ALT SERPL-CCNC: 8 U/L (ref 10–40)
ANION GAP SERPL CALCULATED.3IONS-SCNC: 13 MMOL/L (ref 4–16)
AST SERPL-CCNC: 22 U/L (ref 15–37)
BASOPHILS # BLD: 0.04 K/UL
BASOPHILS NFR BLD: 1 % (ref 0–1)
BILIRUB SERPL-MCNC: 0.5 MG/DL (ref 0–1)
BUN SERPL-MCNC: 20 MG/DL (ref 6–23)
CALCIUM SERPL-MCNC: 7.5 MG/DL (ref 8.3–10.6)
CHLORIDE SERPL-SCNC: 99 MMOL/L (ref 99–110)
CO2 SERPL-SCNC: 26 MMOL/L (ref 21–32)
CREAT SERPL-MCNC: 1.5 MG/DL (ref 0.6–1.1)
EKG ATRIAL RATE: 86 BPM
EKG ATRIAL RATE: 89 BPM
EKG ATRIAL RATE: 94 BPM
EKG DIAGNOSIS: NORMAL
EKG P AXIS: 16 DEGREES
EKG P AXIS: 27 DEGREES
EKG P AXIS: 42 DEGREES
EKG P-R INTERVAL: 132 MS
EKG P-R INTERVAL: 134 MS
EKG P-R INTERVAL: 138 MS
EKG Q-T INTERVAL: 358 MS
EKG Q-T INTERVAL: 358 MS
EKG Q-T INTERVAL: 360 MS
EKG QRS DURATION: 68 MS
EKG QRS DURATION: 70 MS
EKG QRS DURATION: 74 MS
EKG QTC CALCULATION (BAZETT): 428 MS
EKG QTC CALCULATION (BAZETT): 438 MS
EKG QTC CALCULATION (BAZETT): 447 MS
EKG R AXIS: -14 DEGREES
EKG R AXIS: -16 DEGREES
EKG R AXIS: -17 DEGREES
EKG T AXIS: 122 DEGREES
EKG T AXIS: 123 DEGREES
EKG T AXIS: 136 DEGREES
EKG VENTRICULAR RATE: 86 BPM
EKG VENTRICULAR RATE: 89 BPM
EKG VENTRICULAR RATE: 94 BPM
EOSINOPHIL # BLD: 0.01 K/UL
EOSINOPHILS RELATIVE PERCENT: 0 % (ref 0–3)
ERYTHROCYTE [DISTWIDTH] IN BLOOD BY AUTOMATED COUNT: 16 % (ref 11.7–14.9)
GFR, ESTIMATED: 34 ML/MIN/1.73M2
GLUCOSE SERPL-MCNC: 111 MG/DL (ref 74–99)
HCT VFR BLD AUTO: 34.1 % (ref 37–47)
HGB BLD-MCNC: 10.4 G/DL (ref 12.5–16)
IMM GRANULOCYTES # BLD AUTO: 0.02 K/UL
IMM GRANULOCYTES NFR BLD: 0 %
LYMPHOCYTES NFR BLD: 0.86 K/UL
LYMPHOCYTES RELATIVE PERCENT: 14 % (ref 24–44)
MAGNESIUM SERPL-MCNC: 0.9 MG/DL (ref 1.8–2.4)
MCH RBC QN AUTO: 31 PG (ref 27–31)
MCHC RBC AUTO-ENTMCNC: 30.5 G/DL (ref 32–36)
MCV RBC AUTO: 101.5 FL (ref 78–100)
MONOCYTES NFR BLD: 0.68 K/UL
MONOCYTES NFR BLD: 11 % (ref 0–4)
NEUTROPHILS NFR BLD: 75 % (ref 36–66)
NEUTS SEG NFR BLD: 4.71 K/UL
PLATELET # BLD AUTO: 128 K/UL (ref 140–440)
PMV BLD AUTO: 12.8 FL (ref 7.5–11.1)
POTASSIUM SERPL-SCNC: 4.2 MMOL/L (ref 3.5–5.1)
PROT SERPL-MCNC: 5.1 G/DL (ref 6.4–8.2)
RBC # BLD AUTO: 3.36 M/UL (ref 4.2–5.4)
SODIUM SERPL-SCNC: 138 MMOL/L (ref 135–145)
TROPONIN I SERPL HS-MCNC: 27 NG/L (ref 0–13)
TROPONIN I SERPL HS-MCNC: 29 NG/L (ref 0–13)
WBC OTHER # BLD: 6.3 K/UL (ref 4–10.5)

## 2025-02-27 PROCEDURE — 84484 ASSAY OF TROPONIN QUANT: CPT

## 2025-02-27 PROCEDURE — 2580000003 HC RX 258: Performed by: STUDENT IN AN ORGANIZED HEALTH CARE EDUCATION/TRAINING PROGRAM

## 2025-02-27 PROCEDURE — G0378 HOSPITAL OBSERVATION PER HR: HCPCS

## 2025-02-27 PROCEDURE — 99285 EMERGENCY DEPT VISIT HI MDM: CPT

## 2025-02-27 PROCEDURE — 83735 ASSAY OF MAGNESIUM: CPT

## 2025-02-27 PROCEDURE — 2500000003 HC RX 250 WO HCPCS: Performed by: STUDENT IN AN ORGANIZED HEALTH CARE EDUCATION/TRAINING PROGRAM

## 2025-02-27 PROCEDURE — 6370000000 HC RX 637 (ALT 250 FOR IP): Performed by: STUDENT IN AN ORGANIZED HEALTH CARE EDUCATION/TRAINING PROGRAM

## 2025-02-27 PROCEDURE — 85025 COMPLETE CBC W/AUTO DIFF WBC: CPT

## 2025-02-27 PROCEDURE — 80053 COMPREHEN METABOLIC PANEL: CPT

## 2025-02-27 PROCEDURE — 93005 ELECTROCARDIOGRAM TRACING: CPT | Performed by: STUDENT IN AN ORGANIZED HEALTH CARE EDUCATION/TRAINING PROGRAM

## 2025-02-27 PROCEDURE — 96365 THER/PROPH/DIAG IV INF INIT: CPT

## 2025-02-27 PROCEDURE — 6360000002 HC RX W HCPCS: Performed by: STUDENT IN AN ORGANIZED HEALTH CARE EDUCATION/TRAINING PROGRAM

## 2025-02-27 PROCEDURE — 71045 X-RAY EXAM CHEST 1 VIEW: CPT

## 2025-02-27 PROCEDURE — 96366 THER/PROPH/DIAG IV INF ADDON: CPT

## 2025-02-27 PROCEDURE — 80048 BASIC METABOLIC PNL TOTAL CA: CPT

## 2025-02-27 PROCEDURE — 81001 URINALYSIS AUTO W/SCOPE: CPT

## 2025-02-27 PROCEDURE — 96372 THER/PROPH/DIAG INJ SC/IM: CPT

## 2025-02-27 PROCEDURE — 93010 ELECTROCARDIOGRAM REPORT: CPT | Performed by: INTERNAL MEDICINE

## 2025-02-27 RX ORDER — SODIUM CHLORIDE, SODIUM LACTATE, POTASSIUM CHLORIDE, CALCIUM CHLORIDE 600; 310; 30; 20 MG/100ML; MG/100ML; MG/100ML; MG/100ML
INJECTION, SOLUTION INTRAVENOUS CONTINUOUS
Status: DISPENSED | OUTPATIENT
Start: 2025-02-27 | End: 2025-02-28

## 2025-02-27 RX ORDER — LEFLUNOMIDE 20 MG/1
10 TABLET ORAL DAILY
Status: DISCONTINUED | OUTPATIENT
Start: 2025-02-27 | End: 2025-03-02 | Stop reason: HOSPADM

## 2025-02-27 RX ORDER — SODIUM CHLORIDE 9 MG/ML
INJECTION, SOLUTION INTRAVENOUS PRN
Status: DISCONTINUED | OUTPATIENT
Start: 2025-02-27 | End: 2025-03-02 | Stop reason: HOSPADM

## 2025-02-27 RX ORDER — LANOLIN ALCOHOL/MO/W.PET/CERES
400 CREAM (GRAM) TOPICAL ONCE
Status: COMPLETED | OUTPATIENT
Start: 2025-02-27 | End: 2025-02-27

## 2025-02-27 RX ORDER — POLYETHYLENE GLYCOL 3350 17 G/17G
17 POWDER, FOR SOLUTION ORAL DAILY PRN
Status: DISCONTINUED | OUTPATIENT
Start: 2025-02-27 | End: 2025-03-02 | Stop reason: HOSPADM

## 2025-02-27 RX ORDER — ENOXAPARIN SODIUM 100 MG/ML
30 INJECTION SUBCUTANEOUS DAILY
Status: DISCONTINUED | OUTPATIENT
Start: 2025-02-27 | End: 2025-03-02 | Stop reason: HOSPADM

## 2025-02-27 RX ORDER — MAGNESIUM SULFATE IN WATER 40 MG/ML
2000 INJECTION, SOLUTION INTRAVENOUS ONCE
Status: COMPLETED | OUTPATIENT
Start: 2025-02-27 | End: 2025-02-27

## 2025-02-27 RX ORDER — PANTOPRAZOLE SODIUM 40 MG/1
40 TABLET, DELAYED RELEASE ORAL DAILY
Status: DISCONTINUED | OUTPATIENT
Start: 2025-02-27 | End: 2025-03-02 | Stop reason: HOSPADM

## 2025-02-27 RX ORDER — ACETAMINOPHEN 650 MG/1
650 SUPPOSITORY RECTAL EVERY 6 HOURS PRN
Status: DISCONTINUED | OUTPATIENT
Start: 2025-02-27 | End: 2025-03-02 | Stop reason: HOSPADM

## 2025-02-27 RX ORDER — ONDANSETRON 2 MG/ML
4 INJECTION INTRAMUSCULAR; INTRAVENOUS EVERY 6 HOURS PRN
Status: DISCONTINUED | OUTPATIENT
Start: 2025-02-27 | End: 2025-03-02 | Stop reason: HOSPADM

## 2025-02-27 RX ORDER — LEVOTHYROXINE SODIUM 150 UG/1
150 TABLET ORAL DAILY
Status: DISCONTINUED | OUTPATIENT
Start: 2025-02-27 | End: 2025-03-02 | Stop reason: HOSPADM

## 2025-02-27 RX ORDER — FUROSEMIDE 20 MG/1
20 TABLET ORAL DAILY PRN
Status: DISCONTINUED | OUTPATIENT
Start: 2025-02-27 | End: 2025-03-02 | Stop reason: HOSPADM

## 2025-02-27 RX ORDER — ACETAMINOPHEN 325 MG/1
650 TABLET ORAL EVERY 6 HOURS PRN
Status: DISCONTINUED | OUTPATIENT
Start: 2025-02-27 | End: 2025-03-02 | Stop reason: HOSPADM

## 2025-02-27 RX ORDER — SODIUM CHLORIDE 0.9 % (FLUSH) 0.9 %
5-40 SYRINGE (ML) INJECTION PRN
Status: DISCONTINUED | OUTPATIENT
Start: 2025-02-27 | End: 2025-03-02 | Stop reason: HOSPADM

## 2025-02-27 RX ORDER — LANOLIN ALCOHOL/MO/W.PET/CERES
400 CREAM (GRAM) TOPICAL 2 TIMES DAILY
Status: DISCONTINUED | OUTPATIENT
Start: 2025-02-27 | End: 2025-03-02 | Stop reason: HOSPADM

## 2025-02-27 RX ORDER — ONDANSETRON 4 MG/1
4 TABLET, ORALLY DISINTEGRATING ORAL EVERY 8 HOURS PRN
Status: DISCONTINUED | OUTPATIENT
Start: 2025-02-27 | End: 2025-03-02 | Stop reason: HOSPADM

## 2025-02-27 RX ORDER — SODIUM CHLORIDE 0.9 % (FLUSH) 0.9 %
5-40 SYRINGE (ML) INJECTION EVERY 12 HOURS SCHEDULED
Status: DISCONTINUED | OUTPATIENT
Start: 2025-02-27 | End: 2025-03-02 | Stop reason: HOSPADM

## 2025-02-27 RX ORDER — MAGNESIUM SULFATE IN WATER 40 MG/ML
2000 INJECTION, SOLUTION INTRAVENOUS ONCE
Status: DISCONTINUED | OUTPATIENT
Start: 2025-02-27 | End: 2025-02-27

## 2025-02-27 RX ORDER — METOPROLOL TARTRATE 50 MG
50 TABLET ORAL 2 TIMES DAILY
Status: DISCONTINUED | OUTPATIENT
Start: 2025-02-27 | End: 2025-03-02 | Stop reason: HOSPADM

## 2025-02-27 RX ADMIN — METOPROLOL TARTRATE 50 MG: 50 TABLET, FILM COATED ORAL at 20:54

## 2025-02-27 RX ADMIN — Medication 400 MG: at 12:46

## 2025-02-27 RX ADMIN — LEFLUNOMIDE 10 MG: 20 TABLET ORAL at 20:54

## 2025-02-27 RX ADMIN — SODIUM CHLORIDE, SODIUM LACTATE, POTASSIUM CHLORIDE, AND CALCIUM CHLORIDE: .6; .31; .03; .02 INJECTION, SOLUTION INTRAVENOUS at 19:54

## 2025-02-27 RX ADMIN — Medication 400 MG: at 20:53

## 2025-02-27 RX ADMIN — MAGNESIUM SULFATE HEPTAHYDRATE 2000 MG: 40 INJECTION, SOLUTION INTRAVENOUS at 12:46

## 2025-02-27 RX ADMIN — MAGNESIUM SULFATE HEPTAHYDRATE 2000 MG: 40 INJECTION, SOLUTION INTRAVENOUS at 14:54

## 2025-02-27 RX ADMIN — ENOXAPARIN SODIUM 30 MG: 100 INJECTION SUBCUTANEOUS at 17:40

## 2025-02-27 RX ADMIN — SODIUM CHLORIDE, PRESERVATIVE FREE 10 ML: 5 INJECTION INTRAVENOUS at 20:59

## 2025-02-27 ASSESSMENT — PAIN - FUNCTIONAL ASSESSMENT: PAIN_FUNCTIONAL_ASSESSMENT: NONE - DENIES PAIN

## 2025-02-27 ASSESSMENT — PAIN DESCRIPTION - LOCATION: LOCATION: OTHER (COMMENT)

## 2025-02-27 ASSESSMENT — LIFESTYLE VARIABLES
HOW MANY STANDARD DRINKS CONTAINING ALCOHOL DO YOU HAVE ON A TYPICAL DAY: PATIENT DOES NOT DRINK
HOW OFTEN DO YOU HAVE A DRINK CONTAINING ALCOHOL: NEVER

## 2025-02-27 ASSESSMENT — PAIN DESCRIPTION - DESCRIPTORS: DESCRIPTORS: ACHING

## 2025-02-27 ASSESSMENT — PAIN SCALES - GENERAL: PAINLEVEL_OUTOF10: 7

## 2025-02-27 NOTE — ED NOTES
Orange juice and crackers and p nut butter provided. Resting on cot. No acute distress. Updates on plan of care. No further needs at this time.

## 2025-02-27 NOTE — H&P
V2.0  History and Physical      Name:  Edna Dowell /Age/Sex: 1940  (84 y.o. female)   MRN & CSN:  [id number] & [id number] Encounter Date/Time: 2025 3:04 PM EST   Location:  ED- PCP: Bob Abraham MD       Hospital Day: 1    Assessment and Plan:   Edna Dowell is a 84 y.o. female with a pmh of HTN, multiple myeloma, thyroid disease, left lower extremity DVT on Eliquis, essential hypertension, hypothyroidism who presents with Generalized weakness    Hospital Problems             Last Modified POA    * (Principal) Generalized weakness 2025 Yes     Past Medical History:   Diagnosis Date    Hypertension     Multiple myeloma (HCC)     Swelling     Thyroid disease          Plan:  Hypomagnesemia:  -Patient presenting with magnesium level of 0.9, status post 2 g IV in the ED  -Will recheck and replete as needed to keep magnesium level above 2    Generalized weakness  -Fall precautions  -PT/OT  -Nutrition supplements with diet    Mild worsening renal function:  -Will hydrate and recheck renal function    Dysuria:  -Follow Ua    Recently diagnosed lower extremity DVT-continue Eliquis  History of multiple myeloma-patient follows as outpatient with oncologist  Hypertension-patient on metoprolol  GERD-patient on pantoprazole  Hypothyroidism-patient on Synthyroid      Disposition:   Current Living situation: Home  Expected Disposition: TBD  Estimated D/C: 1-2 days    Diet No diet orders on file   DVT Prophylaxis [] Lovenox, []  Heparin, [] SCDs, [] Ambulation,  [x] Eliquis, [] Xarelto, [] Coumadin   Code Status Prior   Surrogate Decision Maker/ POA 12     Personally reviewed Lab Studies and Imaging     Discussed management of the case with ED physician who recommended at patient further workup    EKG interpreted personally and results no acute changes    Imaging that was interpreted personally includes chest x-ray and results no acute changes    History from:     patient    History of Present Illness:

## 2025-02-27 NOTE — ED PROVIDER NOTES
Emergency Department Encounter      Patient: Edna Dowell  MRN: [id number]  : 1940  Date of Evaluation: 2025  PCP: Bob Abraham MD  ED Provider:  Ortega Monahan DO    Triage Chief Complaint:    OTHER (Home health nurse called for irregular heartbeat and hypotension. Pt only complaint is fatigue. Pt not hypotensive on arrival for ems or in ER)    HPI:   Edna Dowell is a 84 y.o. female that presents to the emergency department for evaluation of her vital signs.  Patient gets home health evaluation and the nurse was there to check on her.  The nurse said that she had a hard time getting a blood pressure and therefore she recommended coming to the emergency department for further evaluation.  The patient herself is rather asymptomatic, states this morning she does feel slightly more fatigued but declines any recent cough, cold, congestion.  There has been no associated fevers or chills.  She has no chest pain, shortness of breath, palpitations or difficulty breathing.  She has no associated abdominal pain nausea or vomiting.  She has still been tolerating p.o. intake without difficulty.  She has been compliant with all of her medications.  Declines any urinary symptoms.  No constipation or diarrhea.  States that she has been getting around the house normally with her walker at baseline.    Discharge summary reviewed from 2024, patient with a past medical history of hypertension, multiple myeloma, DVT on anticoagulation who initially presented to the outlying facility with concerns of epistaxis.  Patient had posterior Rhino Rocket placed and was transferred for close monitoring.  Did well and was discharged in stable condition.        History from : Patient and Family     Limitations to history : None    MDM/ED Course:       In brief,     Pleasant 84-year-old female presenting to the emergency department as above.  Concern for potential abnormal vital signs by home health nurse.  However here

## 2025-02-27 NOTE — ED NOTES
Pt helped to bathroom via wheelchair. Pt back in bed. Pt has no needs at this time call light within reach.

## 2025-02-28 PROBLEM — E44.0 MODERATE MALNUTRITION: Status: ACTIVE | Noted: 2025-02-28

## 2025-02-28 LAB
ANION GAP SERPL CALCULATED.3IONS-SCNC: 9 MMOL/L (ref 9–17)
BILIRUB UR QL STRIP: NEGATIVE
BUN SERPL-MCNC: 19 MG/DL (ref 7–20)
CALCIUM SERPL-MCNC: 7.8 MG/DL (ref 8.3–10.6)
CHARACTER UR: ABNORMAL
CHLORIDE SERPL-SCNC: 102 MMOL/L (ref 99–110)
CLARITY UR: CLEAR
CO2 SERPL-SCNC: 27 MMOL/L (ref 21–32)
COLOR UR: YELLOW
CREAT SERPL-MCNC: 1.3 MG/DL (ref 0.6–1.2)
EKG ATRIAL RATE: 101 BPM
EKG ATRIAL RATE: 83 BPM
EKG DIAGNOSIS: NORMAL
EKG DIAGNOSIS: NORMAL
EKG P AXIS: 27 DEGREES
EKG P AXIS: 29 DEGREES
EKG P-R INTERVAL: 132 MS
EKG P-R INTERVAL: 140 MS
EKG Q-T INTERVAL: 350 MS
EKG Q-T INTERVAL: 392 MS
EKG QRS DURATION: 70 MS
EKG QRS DURATION: 74 MS
EKG QTC CALCULATION (BAZETT): 453 MS
EKG QTC CALCULATION (BAZETT): 460 MS
EKG R AXIS: -14 DEGREES
EKG R AXIS: -17 DEGREES
EKG T AXIS: 130 DEGREES
EKG T AXIS: 149 DEGREES
EKG VENTRICULAR RATE: 101 BPM
EKG VENTRICULAR RATE: 83 BPM
EPI CELLS #/AREA URNS HPF: 1 /HPF
GFR, ESTIMATED: 39 ML/MIN/1.73M2
GLUCOSE SERPL-MCNC: 94 MG/DL (ref 74–99)
GLUCOSE UR STRIP-MCNC: NEGATIVE MG/DL
HGB UR QL STRIP.AUTO: NEGATIVE
KETONES UR STRIP-MCNC: NEGATIVE MG/DL
LEUKOCYTE ESTERASE UR QL STRIP: ABNORMAL
MAGNESIUM SERPL-MCNC: 2.2 MG/DL (ref 1.8–2.4)
MUCOUS THREADS URNS QL MICRO: ABNORMAL
NITRITE UR QL STRIP: NEGATIVE
PH UR STRIP: 6 [PH] (ref 5–8)
POTASSIUM SERPL-SCNC: 4.1 MMOL/L (ref 3.5–5.1)
PROT UR STRIP-MCNC: ABNORMAL MG/DL
RBC #/AREA URNS HPF: 2 /HPF (ref 0–2)
SODIUM SERPL-SCNC: 138 MMOL/L (ref 136–145)
SP GR UR STRIP: 1.01 (ref 1–1.03)
TROPONIN I SERPL HS-MCNC: 26 NG/L (ref 0–14)
UROBILINOGEN UR STRIP-ACNC: 0.2 EU/DL (ref 0–1)
WBC #/AREA URNS HPF: 9 /HPF (ref 0–5)

## 2025-02-28 PROCEDURE — 2500000003 HC RX 250 WO HCPCS: Performed by: STUDENT IN AN ORGANIZED HEALTH CARE EDUCATION/TRAINING PROGRAM

## 2025-02-28 PROCEDURE — 97116 GAIT TRAINING THERAPY: CPT

## 2025-02-28 PROCEDURE — 97163 PT EVAL HIGH COMPLEX 45 MIN: CPT

## 2025-02-28 PROCEDURE — 6370000000 HC RX 637 (ALT 250 FOR IP): Performed by: STUDENT IN AN ORGANIZED HEALTH CARE EDUCATION/TRAINING PROGRAM

## 2025-02-28 PROCEDURE — 6360000002 HC RX W HCPCS: Performed by: STUDENT IN AN ORGANIZED HEALTH CARE EDUCATION/TRAINING PROGRAM

## 2025-02-28 PROCEDURE — G0378 HOSPITAL OBSERVATION PER HR: HCPCS

## 2025-02-28 PROCEDURE — 93010 ELECTROCARDIOGRAM REPORT: CPT | Performed by: INTERNAL MEDICINE

## 2025-02-28 PROCEDURE — 94761 N-INVAS EAR/PLS OXIMETRY MLT: CPT

## 2025-02-28 PROCEDURE — 6360000002 HC RX W HCPCS: Performed by: NURSE PRACTITIONER

## 2025-02-28 PROCEDURE — 96372 THER/PROPH/DIAG INJ SC/IM: CPT

## 2025-02-28 PROCEDURE — 96375 TX/PRO/DX INJ NEW DRUG ADDON: CPT

## 2025-02-28 PROCEDURE — 97166 OT EVAL MOD COMPLEX 45 MIN: CPT

## 2025-02-28 PROCEDURE — 97530 THERAPEUTIC ACTIVITIES: CPT

## 2025-02-28 PROCEDURE — 93005 ELECTROCARDIOGRAM TRACING: CPT | Performed by: NURSE PRACTITIONER

## 2025-02-28 RX ORDER — LABETALOL HYDROCHLORIDE 5 MG/ML
10 INJECTION, SOLUTION INTRAVENOUS ONCE
Status: COMPLETED | OUTPATIENT
Start: 2025-02-28 | End: 2025-02-28

## 2025-02-28 RX ADMIN — Medication 400 MG: at 20:50

## 2025-02-28 RX ADMIN — Medication 400 MG: at 09:23

## 2025-02-28 RX ADMIN — METOPROLOL TARTRATE 50 MG: 50 TABLET, FILM COATED ORAL at 09:23

## 2025-02-28 RX ADMIN — LABETALOL HYDROCHLORIDE 10 MG: 5 INJECTION, SOLUTION INTRAVENOUS at 05:12

## 2025-02-28 RX ADMIN — SODIUM CHLORIDE, PRESERVATIVE FREE 10 ML: 5 INJECTION INTRAVENOUS at 09:24

## 2025-02-28 RX ADMIN — PANTOPRAZOLE SODIUM 40 MG: 40 TABLET, DELAYED RELEASE ORAL at 09:23

## 2025-02-28 RX ADMIN — SODIUM CHLORIDE, PRESERVATIVE FREE 10 ML: 5 INJECTION INTRAVENOUS at 20:50

## 2025-02-28 RX ADMIN — ENOXAPARIN SODIUM 30 MG: 100 INJECTION SUBCUTANEOUS at 09:23

## 2025-02-28 RX ADMIN — METOPROLOL TARTRATE 50 MG: 50 TABLET, FILM COATED ORAL at 20:50

## 2025-02-28 RX ADMIN — LEFLUNOMIDE 10 MG: 20 TABLET ORAL at 09:37

## 2025-02-28 RX ADMIN — LEVOTHYROXINE SODIUM 150 MCG: 0.15 TABLET ORAL at 09:24

## 2025-02-28 RX ADMIN — ACETAMINOPHEN 650 MG: 325 TABLET ORAL at 10:39

## 2025-02-28 ASSESSMENT — PAIN - FUNCTIONAL ASSESSMENT: PAIN_FUNCTIONAL_ASSESSMENT: PREVENTS OR INTERFERES SOME ACTIVE ACTIVITIES AND ADLS

## 2025-02-28 ASSESSMENT — PAIN SCALES - GENERAL
PAINLEVEL_OUTOF10: 10
PAINLEVEL_OUTOF10: 0
PAINLEVEL_OUTOF10: 9

## 2025-02-28 ASSESSMENT — PAIN DESCRIPTION - ORIENTATION: ORIENTATION: RIGHT

## 2025-02-28 ASSESSMENT — PAIN DESCRIPTION - DESCRIPTORS: DESCRIPTORS: ACHING

## 2025-02-28 ASSESSMENT — PAIN SCALES - WONG BAKER: WONGBAKER_NUMERICALRESPONSE: NO HURT

## 2025-02-28 ASSESSMENT — PAIN DESCRIPTION - LOCATION: LOCATION: HIP;LEG

## 2025-02-28 NOTE — CONSULTS
[facility]omy Contin[facility] Nurse  Consult Note       Enda Dowell  AGE: 84 y.o.   GENDER: female  : 1940  TODAY'S DATE:  2025    Subjective:     Reason for Evaluation and Assessment:   Wound assessment       Edna Dowell is a 84 y.o. female referred by:   [x] Physician  [] Nursing  [] Other:     Wound Identification:  Wound Type: pressure, burn, and malignant wound  Contributing Factors: chronic pressure, decreased mobility, shear force, and obesity        PAST MEDICAL HISTORY        Diagnosis Date    Hypertension     Multiple myeloma (HCC)     Swelling     Thyroid disease        PAST SURGICAL HISTORY    Past Surgical History:   Procedure Laterality Date    APPENDECTOMY      CHOLECYSTECTOMY      COLON SURGERY      HYSTERECTOMY (CERVIX STATUS UNKNOWN)      INVASIVE VASCULAR N/A 2024    Venogram lower ext bilat performed by Nazario Ha MD at [facility] CARDIAC CATH LAB       FAMILY HISTORY    History reviewed. No pertinent family history.    SOCIAL HISTORY    Social History     Tobacco Use    Smoking status: Never    Smokeless tobacco: Never   Vaping Use    Vaping status: Never Used   Substance Use Topics    Alcohol use: Not Currently    Drug use: Never       ALLERGIES    Allergies   Allergen Reactions    Ampicillin Itching and Rash    Meperidine Other (See Comments)     Low blood pressure    Other reaction(s): Other - comment required   Low blood pressure    Meperidine Hcl Other (See Comments)     Other Reaction(s): Unknown    Other Other (See Comments)     112.9KG///[id number]///DB<br/>Reaction(s): Unknown; Note: 112.9KG///[id number]///DB    Methotrexate Rash    Sulfa Antibiotics Hives, Other (See Comments) and Rash     HIVES,[id number]       MEDICATIONS    No current facility-administered medications on file prior to encounter.     Current Outpatient Medications on File Prior to Encounter   Medication Sig Dispense Refill    calcium carbonate (OSCAL) 500 MG TABS tablet Take 1 tablet by mouth daily

## 2025-03-01 PROCEDURE — 2500000003 HC RX 250 WO HCPCS: Performed by: STUDENT IN AN ORGANIZED HEALTH CARE EDUCATION/TRAINING PROGRAM

## 2025-03-01 PROCEDURE — 6370000000 HC RX 637 (ALT 250 FOR IP)

## 2025-03-01 PROCEDURE — 6360000002 HC RX W HCPCS: Performed by: STUDENT IN AN ORGANIZED HEALTH CARE EDUCATION/TRAINING PROGRAM

## 2025-03-01 PROCEDURE — 6370000000 HC RX 637 (ALT 250 FOR IP): Performed by: STUDENT IN AN ORGANIZED HEALTH CARE EDUCATION/TRAINING PROGRAM

## 2025-03-01 PROCEDURE — 97116 GAIT TRAINING THERAPY: CPT

## 2025-03-01 PROCEDURE — 97110 THERAPEUTIC EXERCISES: CPT

## 2025-03-01 PROCEDURE — 96372 THER/PROPH/DIAG INJ SC/IM: CPT

## 2025-03-01 PROCEDURE — G0378 HOSPITAL OBSERVATION PER HR: HCPCS

## 2025-03-01 PROCEDURE — 94761 N-INVAS EAR/PLS OXIMETRY MLT: CPT

## 2025-03-01 RX ADMIN — Medication 400 MG: at 10:53

## 2025-03-01 RX ADMIN — PANTOPRAZOLE SODIUM 40 MG: 40 TABLET, DELAYED RELEASE ORAL at 10:53

## 2025-03-01 RX ADMIN — Medication 400 MG: at 20:28

## 2025-03-01 RX ADMIN — ENOXAPARIN SODIUM 30 MG: 100 INJECTION SUBCUTANEOUS at 10:53

## 2025-03-01 RX ADMIN — METOPROLOL TARTRATE 50 MG: 50 TABLET, FILM COATED ORAL at 20:28

## 2025-03-01 RX ADMIN — SODIUM CHLORIDE, PRESERVATIVE FREE 10 ML: 5 INJECTION INTRAVENOUS at 10:52

## 2025-03-01 RX ADMIN — SODIUM CHLORIDE, PRESERVATIVE FREE 10 ML: 5 INJECTION INTRAVENOUS at 20:29

## 2025-03-01 RX ADMIN — LEFLUNOMIDE 10 MG: 20 TABLET ORAL at 10:57

## 2025-03-01 RX ADMIN — LEVOTHYROXINE SODIUM 150 MCG: 0.15 TABLET ORAL at 10:53

## 2025-03-01 RX ADMIN — METOPROLOL TARTRATE 50 MG: 50 TABLET, FILM COATED ORAL at 10:52

## 2025-03-01 RX ADMIN — COLLAGENASE SANTYL: 250 OINTMENT TOPICAL at 10:54

## 2025-03-01 ASSESSMENT — PAIN DESCRIPTION - ORIENTATION: ORIENTATION: RIGHT;LEFT;UPPER;LOWER;MID

## 2025-03-01 ASSESSMENT — PAIN SCALES - GENERAL
PAINLEVEL_OUTOF10: 7
PAINLEVEL_OUTOF10: 0

## 2025-03-01 ASSESSMENT — PAIN DESCRIPTION - LOCATION: LOCATION: GENERALIZED

## 2025-03-01 ASSESSMENT — PAIN DESCRIPTION - DESCRIPTORS: DESCRIPTORS: ACHING

## 2025-03-01 NOTE — PROGRESS NOTES
V2.0  [facility] Hospitalist Progress Note      Name:  Edna Dowell /Age/Sex: 1940  (84 y.o. female)   MRN & CSN:  [id number] & [id number] Encounter Date/Time: 2025 9:40 AM EST    Location:  [address] [place] [address]- PCP: Bob Abraham MD       Hospital Day: 2    Assessment and Plan:   Edna Dowell is a 84 y.o. female with pmh as noted below who presents with Generalized weakness    Plan:  Hypomagnesemia:  -Patient presenting with magnesium level of 0.9, status post 2 g IV in the ED  -Will recheck and replete as needed to keep magnesium level above 2     Generalized weakness  -Fall precautions  -PT/OT  -Nutrition supplements with diet     Mild worsening renal function:  -Will hydrate and recheck renal function     Dysuria:  -Follow Ua     Recently diagnosed lower extremity DVT-continue Eliquis  History of multiple myeloma-patient follows as outpatient with oncologist  Hypertension-patient on metoprolol  GERD-patient on pantoprazole  Hypothyroidism-patient on Synthyroid    Diet ADULT ORAL NUTRITION SUPPLEMENT; Breakfast, Lunch, Dinner; Standard High Calorie/High Protein Oral Supplement  ADULT DIET; Regular   DVT Prophylaxis [] Lovenox, []  Heparin, [] SCDs, [] Ambulation,  [] Eliquis, [] Xarelto  [] Coumadin   Code Status Full Code   Disposition From: Home  Expected Disposition: Home  Estimated Date of Discharge: 1-2 days  Patient requires continued admission due to PT/OT,UA   Surrogate Decision Maker/ EUNICE Cooper -spouse     Subjective:     Chief Complaint: OTHER (Home health nurse called for irregular heartbeat and hypotension. Pt only complaint is fatigue. Pt not hypotensive on arrival for ems or in ER)     Doing better today.  Spouse at bedside, reports patient looks improved.  Patient had dysuria, will get UA.  Ordered PT/OT.  High probability of going home later today.  Patient denies chest pain, abdominal pain, nausea or vomiting.    Review of Systems:        Pertinent negatives/positives noted above    Objective: 
4 Eyes Skin Assessment     NAME:  Edna Dowell  YOB: 1940  MEDICAL RECORD NUMBER:  [id number]    The patient is being assessed for  Admission    I agree that at least one RN has performed a thorough Head to Toe Skin Assessment on the patient. ALL assessment sites listed below have been assessed.      Areas assessed by both nurses:    Head, Face, Ears, Shoulders, Back, Chest, Arms, Elbows, Hands, Sacrum. Buttock, Coccyx, Ischium, and Legs. Feet and Heels        Does the Patient have a Wound? Yes wound(s) were present on assessment. LDA wound assessment was Initiated and completed by RN       Hector Prevention initiated by RN: Yes  Wound Care Orders initiated by RN: Yes    Pressure Injury (Stage 3,4, Unstageable, DTI, NWPT, and Complex wounds) if present, place Wound referral order by RN under : Yes    New Ostomies, if present place, Ostomy referral order under : No     Nurse 1 eSignature: Electronically signed by Delma Elliott RN on 2/27/25 at 5:46 PM EST    **SHARE this note so that the co-signing nurse can place an eSignature**    Nurse 2 eSignature: Electronically signed by Erin Savage LPN on 2/27/25 at 5:55 PM EST   
This nurse lvm for EVS requesting a bed frame be brought up for patient's mattress.    
Requirements: 1 ml/kcal  Fluid (ml/day): 1500    Nutrition Diagnosis:   Increased nutrient needs related to acute injury/trauma as evidenced by wounds    Nutrition Interventions:   Food and/or Nutrient Delivery: Continue Current Diet, Continue Oral Nutrition Supplement  Nutrition Education/Counseling: No recommendation at this time  Coordination of Nutrition Care: Continue to monitor while inpatient       Goals:  Goals: PO intake 75% or greater, within 2 days  Type of Goal: New goal  Previous Goal Met: New Goal    Nutrition Monitoring and Evaluation:   Behavioral-Environmental Outcomes: None Identified  Food/Nutrient Intake Outcomes: Food and Nutrient Intake, Supplement Intake  Physical Signs/Symptoms Outcomes: Biochemical Data, Skin, Weight    Discharge Planning:    Continue Oral Nutrition Supplement     Maida Mathew RD, LD  Contact: [phone]        
for falls, Left in chair, Call light within reach, Nurse notified, Gait belt, Chair alarm in place    Restrictions  Restrictions/Precautions  Restrictions/Precautions: General Precautions, Fall Risk     Subjective  General  Chart Reviewed: Yes  Patient assessed for rehabilitation services?: Yes  Family/Caregiver Present: No  Follows Commands: Within Functional Limits  Subjective  Subjective: pain: neck; 7/10         Social/Functional History  Social/Functional History  Lives With: Spouse  Type of Home: Cond  Home Layout: One level  Home Access: Level entry  Bathroom Toilet: Standard  Bathroom Equipment: Shower chair, Grab bars in shower  Home Equipment: Walker - Rolling, Rollator  Prior Level of Assist for ADLs: Independent  Prior Level of Assist for Ambulation: Independent household ambulator, with or without device (mod I with 4ww)  Prior Level of Assist for Transfers: Independent  Active : No  Patient's  Info:   Vision/Hearing  Vision  Vision: Within Functional Limits  Hearing  Hearing: Within functional limits    Cognition   Orientation  Overall Orientation Status: Within Functional Limits  Cognition  Overall Cognitive Status: Exceptions  Arousal/Alertness: Appears intact  Following Commands: Appears intact  Attention Span: Appears intact  Safety Judgement: Decreased awareness of need for safety;Decreased awareness of need for assistance  Insights: Decreased awareness of deficits  Initiation: Requires cues for some  Sequencing: Requires cues for some    Objective  Temp: 97.3 °F (36.3 °C)  Pulse: 77  Heart Rate Source: Monitor  Respirations: 18  SpO2: 98 %  O2 Device: None (Room air)  BP: 109/61  MAP (Calculated): 77  BP Location: Right upper arm  BP Method: Automatic  Patient Position: Semi fowlers        Gross Assessment  Sensation: Intact (BLEs)          Strength RLE  Comment: knee extension: at least 3+/5 observed functionally  Strength LLE  Comment: knee extension: at least 3+/5 observed 
role of OT, therapy POC and functional goals, progression w/ ADLs and transfers, importance of movement and OOB activity, d/c recommendations     Safety Measures: Gait belt used, Left in recliner, Alarm in place  Recommendations for NURSING activity:  Up to chair for all 3 meals and up to bathroom vs BSC for all toileting needs     Assessment:  Pt is a 84 y o F admitted d/t generalized weakness. Pt at baseline is IND for ADLs, has assistance for high level IADLs, and mod I for functional transfers/mobility w/ 4ww. Pt currently presents w/ deficits in ADL and high level IADL independence, functional ADL transfers, strength, and functional activity tolerance. Continued OT services recommended to increase safety and independence with ADL routine and to address remaining functional deficits. Pt would benefit from continued acute care OT services w/ discharge to Facility for moderate post-acute rehabilitation, anticipate 1-2 hours per day and 5 days per week.  .    Complexity: Moderate  Prognosis: Good, no significant barriers to participation at this time.   Occupational Therapy Plan  Times Per Week: 3+         Goals:  Pt will complete all aspects of bed mobility for EOB/OOB ADLs w/ SBA.  Pt will complete UB ADLs w/ SBA.  Pt will complete LB ADLs w/ SBA.  Pt will complete all functional transfers to and from bed, chair, toilet, shower chair w/ SBA.  Pt will ambulate functional household distance w/ SBA.  Pt will complete all aspects of toileting task w/ SBA.  Pt will perform therex/theract in order to increase strength and functional activity tolerance necessary for increased independence w/ ADL routine.    Pt goal: go home, get stronger  Time Frame for STGs: discharge    Equipment: Continue to assess at next LOC    Time:   Time in: 1225  Time out: 1241  Total time: 16  Timed treatment minutes: 8        Electronically signed by:      TEMI Wheeler/TYLER  [id number]    
\"ORG\"      Electronically signed by SHANTAL Schrader CNP on 3/1/2025 at 3:37 PM

## 2025-03-01 NOTE — CARE COORDINATION
CM reviewed chart and saw pt at bedside.  Discussed PT/OT recommendation.  Pt declines SNF.  States she has [facility] and reports that she and her spouse have been doing well at home w/ the HHC services provided.  Dr. Sanchez updated via ps and requested order for C.    Please fax HHC order and AVS to [facility] fax:  [phone].  Please call to notify [facility] of D/C and report [phone]

## 2025-03-01 NOTE — FLOWSHEET NOTE
items:  Social/Functional History  Lives With: Spouse  Type of Home: Condo  Home Layout: One level  Home Access: Level entry  Bathroom Toilet: Standard  Bathroom Equipment: Shower chair, Grab bars in shower  Home Equipment: Walker - Rolling, Rollator  Prior Level of Assist for ADLs: Independent  Prior Level of Assist for Ambulation: Independent household ambulator, with or without device (mod I with 4ww)  Prior Level of Assist for Transfers: Independent  Active : No  Patient's  Info:      Long Term Goals  Time Frame for Long Term Goals : In one week:  Long Term Goal 1: Pt will complete all bed mobility with SBAx1  Long Term Goal 2: Pt will complete sit <> stand transfers with SBAx1  Long Term Goal 3: Pt will ambulate 50 feet with CGAx1 with LRAD  Long Term Goal 4: Pt will independently complete 3 sets of 10 reps of BLE AROM exercises       Electronically signed by:    Rai Simons, [id number]  3/1/2025, 2:46 PM

## 2025-03-01 NOTE — PLAN OF CARE
Problem: Discharge Planning  Goal: Discharge to home or other facility with appropriate resources  2/28/2025 2243 by Yvonne Hagen RN  Outcome: Progressing  Flowsheets (Taken 2/28/2025 2050)  Discharge to home or other facility with appropriate resources: Identify barriers to discharge with patient and caregiver  2/28/2025 1532 by Delma Elliott RN  Outcome: Progressing     Problem: Safety - Adult  Goal: Free from fall injury  2/28/2025 2243 by Yvonne Hagen RN  Outcome: Progressing  2/28/2025 1532 by Delma Elliott RN  Outcome: Progressing     Problem: Skin/Tissue Integrity  Goal: Skin integrity remains intact  Description: 1.  Monitor for areas of redness and/or skin breakdown  2.  Assess vascular access sites hourly  3.  Every 4-6 hours minimum:  Change oxygen saturation probe site  4.  Every 4-6 hours:  If on nasal continuous positive airway pressure, respiratory therapy assess nares and determine need for appliance change or resting period  2/28/2025 2243 by Yvonne Hagen RN  Outcome: Progressing  Flowsheets (Taken 2/28/2025 2050)  Skin Integrity Remains Intact: Monitor for areas of redness and/or skin breakdown  2/28/2025 1532 by Delma Elliott RN  Outcome: Progressing     Problem: ABCDS Injury Assessment  Goal: Absence of physical injury  2/28/2025 2243 by Yvonne Hagen RN  Outcome: Progressing  2/28/2025 1532 by Delma Elliott RN  Outcome: Progressing     Problem: Nutrition Deficit:  Goal: Optimize nutritional status  Outcome: Progressing

## 2025-03-02 VITALS
SYSTOLIC BLOOD PRESSURE: 141 MMHG | WEIGHT: 171.6 LBS | HEIGHT: 64 IN | DIASTOLIC BLOOD PRESSURE: 87 MMHG | RESPIRATION RATE: 18 BRPM | HEART RATE: 79 BPM | TEMPERATURE: 97.3 F | OXYGEN SATURATION: 94 % | BODY MASS INDEX: 29.3 KG/M2

## 2025-03-02 PROCEDURE — 96372 THER/PROPH/DIAG INJ SC/IM: CPT

## 2025-03-02 PROCEDURE — G0378 HOSPITAL OBSERVATION PER HR: HCPCS

## 2025-03-02 PROCEDURE — 2500000003 HC RX 250 WO HCPCS: Performed by: STUDENT IN AN ORGANIZED HEALTH CARE EDUCATION/TRAINING PROGRAM

## 2025-03-02 PROCEDURE — 6360000002 HC RX W HCPCS: Performed by: STUDENT IN AN ORGANIZED HEALTH CARE EDUCATION/TRAINING PROGRAM

## 2025-03-02 PROCEDURE — 94761 N-INVAS EAR/PLS OXIMETRY MLT: CPT

## 2025-03-02 PROCEDURE — 6370000000 HC RX 637 (ALT 250 FOR IP): Performed by: STUDENT IN AN ORGANIZED HEALTH CARE EDUCATION/TRAINING PROGRAM

## 2025-03-02 RX ADMIN — METOPROLOL TARTRATE 50 MG: 50 TABLET, FILM COATED ORAL at 08:36

## 2025-03-02 RX ADMIN — Medication 400 MG: at 08:40

## 2025-03-02 RX ADMIN — SODIUM CHLORIDE, PRESERVATIVE FREE 10 ML: 5 INJECTION INTRAVENOUS at 08:36

## 2025-03-02 RX ADMIN — PANTOPRAZOLE SODIUM 40 MG: 40 TABLET, DELAYED RELEASE ORAL at 08:40

## 2025-03-02 RX ADMIN — ENOXAPARIN SODIUM 30 MG: 100 INJECTION SUBCUTANEOUS at 08:40

## 2025-03-02 RX ADMIN — LEFLUNOMIDE 10 MG: 20 TABLET ORAL at 08:35

## 2025-03-02 RX ADMIN — LEVOTHYROXINE SODIUM 150 MCG: 0.15 TABLET ORAL at 08:40

## 2025-03-02 RX ADMIN — COLLAGENASE SANTYL: 250 OINTMENT TOPICAL at 08:41

## 2025-03-02 ASSESSMENT — PAIN SCALES - GENERAL: PAINLEVEL_OUTOF10: 2

## 2025-03-02 ASSESSMENT — PAIN DESCRIPTION - DESCRIPTORS: DESCRIPTORS: ACHING

## 2025-03-02 ASSESSMENT — PAIN DESCRIPTION - LOCATION: LOCATION: GENERALIZED

## 2025-03-02 NOTE — DISCHARGE INSTR - COC
treatment of the diagnosis listed and that she requires Home Care for greater 30 days.     Update Admission H&P: No change in H&P    PHYSICIAN SIGNATURE:  Electronically signed by SHANTAL Schrader CNP on 3/2/25 at 10:10 AM EST

## 2025-03-02 NOTE — DISCHARGE SUMMARY
UROBILINOGEN 0.2 02/27/2025 09:27 PM    BILIRUBINUR NEGATIVE 02/27/2025 09:27 PM    GLUCOSEU NEGATIVE 02/27/2025 09:27 PM    KETUA NEGATIVE 02/27/2025 09:27 PM     Urine Cultures: No results found for: \"LABURIN\"  Blood Cultures: No results found for: \"BC\"  No results found for: \"BLOODCULT2\"  Organism: No results found for: \"ORG\"    Time Spent Discharging patient 35 minutes    Electronically signed by SHANTAL Schrader CNP on 3/2/2025 at 10:10 AM

## 2025-03-02 NOTE — PLAN OF CARE
Problem: Discharge Planning  Goal: Discharge to home or other facility with appropriate resources  3/1/2025 2032 by Kristi Cedeno RN  Outcome: Progressing  3/1/2025 1639 by Yanira Mortensen LPN  Outcome: Progressing

## 2025-03-19 ENCOUNTER — APPOINTMENT (OUTPATIENT)
Dept: GENERAL RADIOLOGY | Age: 85
DRG: 378 | End: 2025-03-19
Payer: MEDICARE

## 2025-03-19 ENCOUNTER — APPOINTMENT (OUTPATIENT)
Dept: CT IMAGING | Age: 85
DRG: 378 | End: 2025-03-19
Payer: MEDICARE

## 2025-03-19 ENCOUNTER — HOSPITAL ENCOUNTER (INPATIENT)
Age: 85
LOS: 3 days | Discharge: HOME HEALTH | DRG: 378 | End: 2025-03-22
Attending: STUDENT IN AN ORGANIZED HEALTH CARE EDUCATION/TRAINING PROGRAM | Admitting: STUDENT IN AN ORGANIZED HEALTH CARE EDUCATION/TRAINING PROGRAM
Payer: MEDICARE

## 2025-03-19 DIAGNOSIS — R42 LIGHTHEADEDNESS: Primary | ICD-10-CM

## 2025-03-19 DIAGNOSIS — K92.2 GASTROINTESTINAL HEMORRHAGE, UNSPECIFIED GASTROINTESTINAL HEMORRHAGE TYPE: ICD-10-CM

## 2025-03-19 DIAGNOSIS — R19.5 DARK STOOLS: ICD-10-CM

## 2025-03-19 DIAGNOSIS — D64.9 ANEMIA, UNSPECIFIED TYPE: ICD-10-CM

## 2025-03-19 LAB
ALBUMIN SERPL-MCNC: 2.8 G/DL (ref 3.4–5)
ALBUMIN/GLOB SERPL: 1.3 {RATIO} (ref 1.1–2.2)
ALP SERPL-CCNC: 100 U/L (ref 40–129)
ALT SERPL-CCNC: 9 U/L (ref 10–40)
ANION GAP SERPL CALCULATED.3IONS-SCNC: 8 MMOL/L (ref 9–17)
AST SERPL-CCNC: 19 U/L (ref 15–37)
BASOPHILS # BLD: 0.02 K/UL
BASOPHILS NFR BLD: 0 % (ref 0–1)
BILIRUB SERPL-MCNC: 0.2 MG/DL (ref 0–1)
BUN SERPL-MCNC: 40 MG/DL (ref 7–20)
CALCIUM SERPL-MCNC: 8.3 MG/DL (ref 8.3–10.6)
CHLORIDE SERPL-SCNC: 109 MMOL/L (ref 99–110)
CO2 SERPL-SCNC: 23 MMOL/L (ref 21–32)
CREAT SERPL-MCNC: 1.1 MG/DL (ref 0.6–1.2)
EKG ATRIAL RATE: 74 BPM
EKG DIAGNOSIS: NORMAL
EKG P AXIS: 22 DEGREES
EKG P-R INTERVAL: 130 MS
EKG Q-T INTERVAL: 408 MS
EKG QRS DURATION: 86 MS
EKG QTC CALCULATION (BAZETT): 452 MS
EKG R AXIS: -7 DEGREES
EKG T AXIS: 52 DEGREES
EKG VENTRICULAR RATE: 74 BPM
EOSINOPHIL # BLD: 0 K/UL
EOSINOPHILS RELATIVE PERCENT: 0 % (ref 0–3)
ERYTHROCYTE [DISTWIDTH] IN BLOOD BY AUTOMATED COUNT: 16.7 % (ref 11.7–14.9)
FERRITIN SERPL-MCNC: 222 NG/ML (ref 15–150)
FOLATE SERPL-MCNC: 8.4 NG/ML (ref 4.8–24.2)
GFR, ESTIMATED: 47 ML/MIN/1.73M2
GLUCOSE SERPL-MCNC: 115 MG/DL (ref 74–99)
HCT VFR BLD AUTO: 25.6 % (ref 37–47)
HGB BLD-MCNC: 7.6 G/DL (ref 12.5–16)
IMM GRANULOCYTES # BLD AUTO: 0.03 K/UL
IMM GRANULOCYTES NFR BLD: 0 %
IRON SATN MFR SERPL: 24 % (ref 15–50)
IRON SERPL-MCNC: 56 UG/DL (ref 37–145)
LYMPHOCYTES NFR BLD: 0.93 K/UL
LYMPHOCYTES RELATIVE PERCENT: 13 % (ref 24–44)
MAGNESIUM SERPL-MCNC: 2.4 MG/DL (ref 1.8–2.4)
MCH RBC QN AUTO: 30.9 PG (ref 27–31)
MCHC RBC AUTO-ENTMCNC: 29.7 G/DL (ref 32–36)
MCV RBC AUTO: 104.1 FL (ref 78–100)
MONOCYTES NFR BLD: 0.37 K/UL
MONOCYTES NFR BLD: 5 % (ref 0–4)
NEUTROPHILS NFR BLD: 81 % (ref 36–66)
NEUTS SEG NFR BLD: 5.76 K/UL
PLATELET # BLD AUTO: 137 K/UL (ref 140–440)
PMV BLD AUTO: 12.2 FL (ref 7.5–11.1)
POTASSIUM SERPL-SCNC: 5.4 MMOL/L (ref 3.5–5.1)
PROT SERPL-MCNC: 4.9 G/DL (ref 6.4–8.2)
RBC # BLD AUTO: 2.46 M/UL (ref 4.2–5.4)
RETICS # AUTO: 0.07 M/UL
RETICS/RBC NFR AUTO: 2.7 % (ref 0.2–2)
SODIUM SERPL-SCNC: 140 MMOL/L (ref 136–145)
TIBC SERPL-MCNC: 237 UG/DL (ref 260–445)
UNSATURATED IRON BINDING CAPACITY: 181 UG/DL (ref 110–370)
VIT B12 SERPL-MCNC: 304 PG/ML (ref 211–911)
WBC OTHER # BLD: 7.1 K/UL (ref 4–10.5)

## 2025-03-19 PROCEDURE — 71045 X-RAY EXAM CHEST 1 VIEW: CPT

## 2025-03-19 PROCEDURE — 93010 ELECTROCARDIOGRAM REPORT: CPT | Performed by: INTERNAL MEDICINE

## 2025-03-19 PROCEDURE — 82728 ASSAY OF FERRITIN: CPT

## 2025-03-19 PROCEDURE — 86920 COMPATIBILITY TEST SPIN: CPT

## 2025-03-19 PROCEDURE — 83550 IRON BINDING TEST: CPT

## 2025-03-19 PROCEDURE — 2500000003 HC RX 250 WO HCPCS: Performed by: STUDENT IN AN ORGANIZED HEALTH CARE EDUCATION/TRAINING PROGRAM

## 2025-03-19 PROCEDURE — 80053 COMPREHEN METABOLIC PANEL: CPT

## 2025-03-19 PROCEDURE — 83735 ASSAY OF MAGNESIUM: CPT

## 2025-03-19 PROCEDURE — P9016 RBC LEUKOCYTES REDUCED: HCPCS

## 2025-03-19 PROCEDURE — 82607 VITAMIN B-12: CPT

## 2025-03-19 PROCEDURE — 30233N1 TRANSFUSION OF NONAUTOLOGOUS RED BLOOD CELLS INTO PERIPHERAL VEIN, PERCUTANEOUS APPROACH: ICD-10-PCS | Performed by: STUDENT IN AN ORGANIZED HEALTH CARE EDUCATION/TRAINING PROGRAM

## 2025-03-19 PROCEDURE — 74174 CTA ABD&PLVS W/CONTRAST: CPT

## 2025-03-19 PROCEDURE — 70450 CT HEAD/BRAIN W/O DYE: CPT

## 2025-03-19 PROCEDURE — 85045 AUTOMATED RETICULOCYTE COUNT: CPT

## 2025-03-19 PROCEDURE — 86900 BLOOD TYPING SEROLOGIC ABO: CPT

## 2025-03-19 PROCEDURE — 93005 ELECTROCARDIOGRAM TRACING: CPT | Performed by: STUDENT IN AN ORGANIZED HEALTH CARE EDUCATION/TRAINING PROGRAM

## 2025-03-19 PROCEDURE — 85025 COMPLETE CBC W/AUTO DIFF WBC: CPT

## 2025-03-19 PROCEDURE — 6360000002 HC RX W HCPCS

## 2025-03-19 PROCEDURE — 86901 BLOOD TYPING SEROLOGIC RH(D): CPT

## 2025-03-19 PROCEDURE — 82746 ASSAY OF FOLIC ACID SERUM: CPT

## 2025-03-19 PROCEDURE — 96374 THER/PROPH/DIAG INJ IV PUSH: CPT

## 2025-03-19 PROCEDURE — 86850 RBC ANTIBODY SCREEN: CPT

## 2025-03-19 PROCEDURE — 6370000000 HC RX 637 (ALT 250 FOR IP): Performed by: STUDENT IN AN ORGANIZED HEALTH CARE EDUCATION/TRAINING PROGRAM

## 2025-03-19 PROCEDURE — 2140000000 HC CCU INTERMEDIATE R&B

## 2025-03-19 PROCEDURE — 36415 COLL VENOUS BLD VENIPUNCTURE: CPT

## 2025-03-19 PROCEDURE — 36430 TRANSFUSION BLD/BLD COMPNT: CPT

## 2025-03-19 PROCEDURE — 6360000004 HC RX CONTRAST MEDICATION: Performed by: STUDENT IN AN ORGANIZED HEALTH CARE EDUCATION/TRAINING PROGRAM

## 2025-03-19 PROCEDURE — 83540 ASSAY OF IRON: CPT

## 2025-03-19 PROCEDURE — 72125 CT NECK SPINE W/O DYE: CPT

## 2025-03-19 PROCEDURE — 94761 N-INVAS EAR/PLS OXIMETRY MLT: CPT

## 2025-03-19 PROCEDURE — 99285 EMERGENCY DEPT VISIT HI MDM: CPT

## 2025-03-19 RX ORDER — POTASSIUM CHLORIDE 7.45 MG/ML
10 INJECTION INTRAVENOUS PRN
Status: DISCONTINUED | OUTPATIENT
Start: 2025-03-19 | End: 2025-03-22 | Stop reason: HOSPADM

## 2025-03-19 RX ORDER — ACETAMINOPHEN 325 MG/1
650 TABLET ORAL EVERY 6 HOURS PRN
Status: DISCONTINUED | OUTPATIENT
Start: 2025-03-19 | End: 2025-03-22 | Stop reason: HOSPADM

## 2025-03-19 RX ORDER — CALCIUM CARBONATE 500(1250)
500 TABLET ORAL DAILY
Status: DISCONTINUED | OUTPATIENT
Start: 2025-03-20 | End: 2025-03-22 | Stop reason: HOSPADM

## 2025-03-19 RX ORDER — METOPROLOL TARTRATE 50 MG
50 TABLET ORAL 2 TIMES DAILY
Status: DISCONTINUED | OUTPATIENT
Start: 2025-03-20 | End: 2025-03-22 | Stop reason: HOSPADM

## 2025-03-19 RX ORDER — ACETAMINOPHEN 650 MG/1
650 SUPPOSITORY RECTAL EVERY 6 HOURS PRN
Status: DISCONTINUED | OUTPATIENT
Start: 2025-03-19 | End: 2025-03-22 | Stop reason: HOSPADM

## 2025-03-19 RX ORDER — ONDANSETRON 4 MG/1
4 TABLET, ORALLY DISINTEGRATING ORAL EVERY 8 HOURS PRN
Status: DISCONTINUED | OUTPATIENT
Start: 2025-03-19 | End: 2025-03-22 | Stop reason: HOSPADM

## 2025-03-19 RX ORDER — SODIUM CHLORIDE 0.9 % (FLUSH) 0.9 %
5-40 SYRINGE (ML) INJECTION EVERY 12 HOURS SCHEDULED
Status: DISCONTINUED | OUTPATIENT
Start: 2025-03-19 | End: 2025-03-22 | Stop reason: HOSPADM

## 2025-03-19 RX ORDER — LANOLIN ALCOHOL/MO/W.PET/CERES
400 CREAM (GRAM) TOPICAL 2 TIMES DAILY
Status: DISCONTINUED | OUTPATIENT
Start: 2025-03-19 | End: 2025-03-22 | Stop reason: HOSPADM

## 2025-03-19 RX ORDER — PREDNISONE 5 MG/1
5 TABLET ORAL DAILY
Status: DISCONTINUED | OUTPATIENT
Start: 2025-03-20 | End: 2025-03-22 | Stop reason: HOSPADM

## 2025-03-19 RX ORDER — SODIUM CHLORIDE 9 MG/ML
INJECTION, SOLUTION INTRAVENOUS PRN
Status: DISCONTINUED | OUTPATIENT
Start: 2025-03-19 | End: 2025-03-22 | Stop reason: HOSPADM

## 2025-03-19 RX ORDER — MAGNESIUM SULFATE IN WATER 40 MG/ML
2000 INJECTION, SOLUTION INTRAVENOUS PRN
Status: DISCONTINUED | OUTPATIENT
Start: 2025-03-19 | End: 2025-03-22 | Stop reason: HOSPADM

## 2025-03-19 RX ORDER — SODIUM CHLORIDE 0.9 % (FLUSH) 0.9 %
5-40 SYRINGE (ML) INJECTION PRN
Status: DISCONTINUED | OUTPATIENT
Start: 2025-03-19 | End: 2025-03-22 | Stop reason: HOSPADM

## 2025-03-19 RX ORDER — PANTOPRAZOLE SODIUM 40 MG/10ML
40 INJECTION, POWDER, LYOPHILIZED, FOR SOLUTION INTRAVENOUS 2 TIMES DAILY
Status: DISCONTINUED | OUTPATIENT
Start: 2025-03-20 | End: 2025-03-22 | Stop reason: HOSPADM

## 2025-03-19 RX ORDER — IOPAMIDOL 755 MG/ML
75 INJECTION, SOLUTION INTRAVASCULAR
Status: COMPLETED | OUTPATIENT
Start: 2025-03-19 | End: 2025-03-19

## 2025-03-19 RX ORDER — PANTOPRAZOLE SODIUM 40 MG/10ML
40 INJECTION, POWDER, LYOPHILIZED, FOR SOLUTION INTRAVENOUS ONCE
Status: COMPLETED | OUTPATIENT
Start: 2025-03-19 | End: 2025-03-19

## 2025-03-19 RX ORDER — ONDANSETRON 2 MG/ML
4 INJECTION INTRAMUSCULAR; INTRAVENOUS EVERY 6 HOURS PRN
Status: DISCONTINUED | OUTPATIENT
Start: 2025-03-19 | End: 2025-03-22 | Stop reason: HOSPADM

## 2025-03-19 RX ORDER — POTASSIUM CHLORIDE 1500 MG/1
20 TABLET, EXTENDED RELEASE ORAL 2 TIMES DAILY
Status: DISCONTINUED | OUTPATIENT
Start: 2025-03-19 | End: 2025-03-22 | Stop reason: HOSPADM

## 2025-03-19 RX ORDER — LEVOTHYROXINE SODIUM 150 UG/1
150 TABLET ORAL
Status: DISCONTINUED | OUTPATIENT
Start: 2025-03-20 | End: 2025-03-22 | Stop reason: HOSPADM

## 2025-03-19 RX ADMIN — SODIUM CHLORIDE, PRESERVATIVE FREE 10 ML: 5 INJECTION INTRAVENOUS at 23:02

## 2025-03-19 RX ADMIN — IOPAMIDOL 75 ML: 755 INJECTION, SOLUTION INTRAVENOUS at 21:40

## 2025-03-19 RX ADMIN — PANTOPRAZOLE SODIUM 40 MG: 40 INJECTION, POWDER, FOR SOLUTION INTRAVENOUS at 20:03

## 2025-03-19 RX ADMIN — Medication 400 MG: at 23:01

## 2025-03-19 ASSESSMENT — PAIN - FUNCTIONAL ASSESSMENT: PAIN_FUNCTIONAL_ASSESSMENT: 0-10

## 2025-03-19 ASSESSMENT — PAIN SCALES - GENERAL: PAINLEVEL_OUTOF10: 0

## 2025-03-19 NOTE — CONSENT
Informed Consent for Blood Component Transfusion Note    I have discussed with the patient the rationale for blood component transfusion; its benefits in treating or preventing fatigue, organ damage, or death; and its risk which includes mild transfusion reactions, rare risk of blood borne infection, or more serious but rare reactions. I have discussed the alternatives to transfusion, including the risk and consequences of not receiving transfusion. The patient had an opportunity to ask questions and had agreed to proceed with transfusion of blood components.    Electronically signed by Nicolas Momin DO on 3/19/25 at 7:14 PM EDT

## 2025-03-19 NOTE — ED PROVIDER NOTES
Emergency Department Encounter    Patient: Edna Dowell  MRN: [id number]  : 1940  Date of Evaluation: 3/19/2025  ED Provider:  Casey Reyes DO    Triage Chief Complaint:   Fall    [place]:  Edna Dowell is a 84 y.o. female that presents from home after a fall via EMS.  Reports that she was coming out of the bathroom when she began feeling lightheaded and had a near syncopal event.  This caused her to fall to the ground and she landed on her left side.  Does not member if she hit her head.  She is on Eliquis.  She tells me that over the last 1 to 2 weeks she is also had frequent episodes of dark liquid stools.  She describes them as black.  No previous history of GI bleeding.  No nausea vomiting or abdominal pain.  No known liver problems or cirrhosis.  Does not drink any alcohol.  Denies any chest pain or shortness of breath.  Normally ambulates with a walker but was unable to get up due to just feeling generally weak but is not having any pain in her lower extremities or hips.    MDM:    History from : Patient and EMS    On arrival patient appears well is awake alert GCS 15 answering all questions.  From a trauma standpoint no [place] areas of concern but given her age and being on Eliquis we did obtain CT scans of her head and C-spine.  No traumatic injuries noted on these.  We tried multiple attempts for peripheral IV access and he tried with ultrasound.  Patient also has a port as she does have a history of multiple myeloma, port was not withdrawing any blood either.  She otherwise remained hemodynamically stable.  I think would best if patient can be transferred to a larger emergency department and hospital as she will ultimately need admission.  She did have another bowel movement here with melanotic stool.  Since she is hemodynamically stable will plan for transfer to [place] ED for PICC consult for further workup and labs.  Discussed with Dr. Dolan for transport      ED Course as of 25 0483

## 2025-03-19 NOTE — ED PROVIDER NOTES
Emergency Department Encounter  Location: [address]    Patient: Edna Dowell  MRN: [id number]  : 1940  Date of evaluation: 3/19/2025  ED Provider: Nicolas Momin DO    History from : Patient  Limitations to history : None    Chief Complaint:    Fall    [place]:  Edna Dowell is a 84 y.o. female that presents to the emergency department today after having a fall this morning.  Patient states she was walking this morning and felt lightheaded, felt like she was going to pass out, and lowered herself to the ground.  Denies any on her head.  Denies having any loss of consciousness.  She also endorsed to having melanotic stools.  Patient is on Eliquis.    Patient was initially seen at [facility] and evaluated by  who was able to give me report of patient.  They had difficulty obtaining blood work, so she was sent here for possible PICC consult.  Patient did have a large melanotic bowel movement while at [place].      Past Medical History:   Diagnosis Date    Hypertension     Multiple myeloma (HCC)     Swelling     Thyroid disease      Past Surgical History:   Procedure Laterality Date    APPENDECTOMY      CHOLECYSTECTOMY      COLON SURGERY      HYSTERECTOMY (CERVIX STATUS UNKNOWN)      INVASIVE VASCULAR N/A 2024    Venogram lower ext bilat performed by Nazario Ha MD at [facility] CARDIAC CATH LAB     History reviewed. No pertinent family history.  Social History     Socioeconomic History    Marital status:      Spouse name: Not on file    Number of children: Not on file    Years of education: Not on file    Highest education level: Not on file   Occupational History    Not on file   Tobacco Use    Smoking status: Never    Smokeless tobacco: Never   Vaping Use    Vaping status: Never Used   Substance and Sexual Activity    Alcohol use: Not Currently    Drug use: Never    Sexual activity: Not on file   Other Topics Concern    Not on file   Social History Narrative    Not on file     Social Drivers of  5.1 mmol/L    Chloride 109 99 - 110 mmol/L    CO2 23 21 - 32 mmol/L    Anion Gap 8 (L) 9 - 17 mmol/L    Glucose 115 (H) 74 - 99 mg/dL    BUN 40 (H) 7 - 20 mg/dL    Creatinine 1.1 0.6 - 1.2 mg/dL    Est, Glom Filt Rate 47 (L) >60 mL/min/1.73m2    Calcium 8.3 8.3 - 10.6 mg/dL    Total Protein 4.9 (L) 6.4 - 8.2 g/dL    Albumin 2.8 (L) 3.4 - 5.0 g/dL    Albumin/Globulin Ratio 1.3 1.1 - 2.2    Total Bilirubin 0.2 0.0 - 1.0 mg/dL    Alkaline Phosphatase 100 40 - 129 U/L    ALT 9 (L) 10 - 40 U/L    AST 19 15 - 37 U/L   Magnesium   Result Value Ref Range    Magnesium 2.4 1.8 - 2.4 mg/dL   Ferritin   Result Value Ref Range    Ferritin 222 (H) 15 - 150 ng/mL   Iron and TIBC   Result Value Ref Range    Iron 56 37 - 145 ug/dL    TIBC 237 (L) 260 - 445 ug/dL    Iron % Saturation 24 15 - 50 %    UIBC 181 110 - 370 ug/dL   Reticulocytes   Result Value Ref Range    Retic Ct Pct 2.7 (H) 0.2 - 2.0 %    Absolute Retic # 0.066 M/uL   Vitamin B12 & Folate   Result Value Ref Range    Vitamin B-12 304 211 - 911 pg/mL    Folate 8.4 4.8 - 24.2 ng/mL   TSH reflex to FT4   Result Value Ref Range    TSH 0.70 0.27 - 4.20 uIU/mL   Basic Metabolic Panel w/ Reflex to MG   Result Value Ref Range    Sodium 140 136 - 145 mmol/L    Potassium 4.5 3.5 - 5.1 mmol/L    Chloride 109 99 - 110 mmol/L    CO2 22 21 - 32 mmol/L    Anion Gap 9 9 - 17 mmol/L    Glucose 73 (L) 74 - 99 mg/dL    BUN 35 (H) 7 - 20 mg/dL    Creatinine 1.0 0.6 - 1.2 mg/dL    Est, Glom Filt Rate 50 (L) >60 mL/min/1.73m2    Calcium 7.7 (L) 8.3 - 10.6 mg/dL   CBC with Auto Differential   Result Value Ref Range    WBC 6.9 4.0 - 10.5 k/uL    RBC 3.12 (L) 4.20 - 5.40 m/uL    Hemoglobin 9.6 (L) 12.5 - 16.0 g/dL    Hematocrit 30.5 (L) 37.0 - 47.0 %    MCV 97.8 78.0 - 100.0 fL    MCH 30.8 27.0 - 31.0 pg    MCHC 31.5 (L) 32.0 - 36.0 g/dL    RDW 19.1 (H) 11.7 - 14.9 %    Platelets 119 (L) 140 - 440 k/uL    MPV 11.9 (H) 7.5 - 11.1 fL    Neutrophils % 62 36 - 66 %    Lymphocytes % 27 24 - 44 %

## 2025-03-19 NOTE — H&P
Instructions    acetaminophen (TYLENOL) 1,000 mg, Oral, EVERY 6 HOURS PRN    apixaban (ELIQUIS) 5 mg, Oral, 2 TIMES DAILY    calcium carbonate (OSCAL) 500 mg, Oral, DAILY    Compression Stockings MISC Does not apply    furosemide (LASIX) 20 mg, Oral, DAILY PRN    leflunomide (ARAVA) 10 mg, Oral, DAILY    magnesium oxide (MAG-OX) 400 (240 Mg) MG tablet Take 1 tablet by mouth 2 times daily    metoprolol tartrate (LOPRESSOR) 50 mg, Oral, 2 TIMES DAILY    potassium chloride (KLOR-CON M) 20 MEQ extended release tablet 20 mEq, Oral, 2 TIMES DAILY    predniSONE (DELTASONE) 5 mg, Oral, DAILY    SYNTHROID 150 MCG tablet Take 1 tablet by mouth Daily       Medications:     Medications:    pantoprazole  40 mg IntraVENous Once        Infusions:    sodium chloride         PRN Meds:   sodium chloride, , PRN        Data:     CBC:   Recent Labs     03/19/25  1611   WBC 7.1   HGB 7.6*   *   .1*   RDW 16.7*   LYMPHOPCT 13*   MONOPCT 5*   EOSPCT 0   BASOPCT 0   MONOSABS 0.37   LYMPHSABS 0.93   EOSABS 0.00   BASOSABS 0.02     CMP:    Recent Labs     03/19/25  1611      K 5.4*      CO2 23   BUN 40*   CREATININE 1.1   GLUCOSE 115*   CALCIUM 8.3   BILITOT 0.2   ALKPHOS 100   AST 19   ALT 9*     Lipids: No results found for: \"CHOL\", \"HDL\", \"TRIG\"  Hemoglobin A1C:   Lab Results   Component Value Date/Time    LABA1C 5.8 07/18/2024 01:56 AM     TSH: No results found for: \"TSH\"  Troponin: No results found for: \"TROPONINT\"  BNP: No results for input(s): \"PROBNP\" in the last 72 hours.  Lactic Acid: No results for input(s): \"LACTA\" in the last 72 hours.  UA:  Lab Results   Component Value Date/Time    NITRU NEGATIVE 02/27/2025 09:27 PM    COLORU Yellow 02/27/2025 09:27 PM    PHUR 6.0 02/27/2025 09:27 PM    WBCUA 9 02/27/2025 09:27 PM    RBCUA 2 02/27/2025 09:27 PM    MUCUS RARE 02/27/2025 09:27 PM    BACTERIA MANY 10/27/2024 10:47 AM    LEUKOCYTESUR TRACE 02/27/2025 09:27 PM    UROBILINOGEN 0.2 02/27/2025 09:27 PM     BILIRUBINUR NEGATIVE 02/27/2025 09:27 PM    GLUCOSEU NEGATIVE 02/27/2025 09:27 PM    KETUA NEGATIVE 02/27/2025 09:27 PM     Urine Cultures: No results found for: \"LABURIN\"  Blood Cultures: No results found for: \"BC\"  No results found for: \"BLOODCULT2\"  Organism: No results found for: \"ORG\"    Radiology results:  XR CHEST PORTABLE   Final Result      CT CERVICAL SPINE WO CONTRAST   Final Result      CT HEAD WO CONTRAST   Final Result          Chinmay Washington MD  03/19/25 7:55 PM

## 2025-03-20 ENCOUNTER — ANESTHESIA EVENT (OUTPATIENT)
Dept: ENDOSCOPY | Age: 85
End: 2025-03-20
Payer: MEDICARE

## 2025-03-20 ENCOUNTER — ANESTHESIA (OUTPATIENT)
Dept: ENDOSCOPY | Age: 85
End: 2025-03-20
Payer: MEDICARE

## 2025-03-20 LAB
ABO/RH: NORMAL
ANION GAP SERPL CALCULATED.3IONS-SCNC: 9 MMOL/L (ref 9–17)
ANTIBODY SCREEN: NEGATIVE
BASOPHILS # BLD: 0.04 K/UL
BASOPHILS NFR BLD: 1 % (ref 0–1)
BLOOD BANK BLOOD PRODUCT EXPIRATION DATE: NORMAL
BLOOD BANK DISPENSE STATUS: NORMAL
BLOOD BANK ISBT PRODUCT BLOOD TYPE: 5100
BLOOD BANK PRODUCT CODE: NORMAL
BLOOD BANK SAMPLE EXPIRATION: NORMAL
BLOOD BANK UNIT TYPE AND RH: NORMAL
BPU ID: NORMAL
BUN SERPL-MCNC: 35 MG/DL (ref 7–20)
CALCIUM SERPL-MCNC: 7.7 MG/DL (ref 8.3–10.6)
CHLORIDE SERPL-SCNC: 109 MMOL/L (ref 99–110)
CO2 SERPL-SCNC: 22 MMOL/L (ref 21–32)
COMPONENT: NORMAL
CREAT SERPL-MCNC: 1 MG/DL (ref 0.6–1.2)
CROSSMATCH RESULT: NORMAL
EOSINOPHIL # BLD: 0.03 K/UL
EOSINOPHILS RELATIVE PERCENT: 0 % (ref 0–3)
ERYTHROCYTE [DISTWIDTH] IN BLOOD BY AUTOMATED COUNT: 19.1 % (ref 11.7–14.9)
GFR, ESTIMATED: 50 ML/MIN/1.73M2
GLUCOSE SERPL-MCNC: 73 MG/DL (ref 74–99)
HCT VFR BLD AUTO: 30.5 % (ref 37–47)
HCT VFR BLD AUTO: 30.6 % (ref 37–47)
HGB BLD-MCNC: 9.6 G/DL (ref 12.5–16)
HGB BLD-MCNC: 9.6 G/DL (ref 12.5–16)
IMM GRANULOCYTES # BLD AUTO: 0.04 K/UL
IMM GRANULOCYTES NFR BLD: 1 %
INR PPP: 1
LYMPHOCYTES NFR BLD: 1.85 K/UL
LYMPHOCYTES RELATIVE PERCENT: 27 % (ref 24–44)
MCH RBC QN AUTO: 30.8 PG (ref 27–31)
MCHC RBC AUTO-ENTMCNC: 31.5 G/DL (ref 32–36)
MCV RBC AUTO: 97.8 FL (ref 78–100)
MONOCYTES NFR BLD: 0.66 K/UL
MONOCYTES NFR BLD: 10 % (ref 0–4)
NEUTROPHILS NFR BLD: 62 % (ref 36–66)
NEUTS SEG NFR BLD: 4.26 K/UL
PLATELET # BLD AUTO: 119 K/UL (ref 140–440)
PMV BLD AUTO: 11.9 FL (ref 7.5–11.1)
POTASSIUM SERPL-SCNC: 4.5 MMOL/L (ref 3.5–5.1)
PROTHROMBIN TIME: 13.3 SEC (ref 11.7–14.5)
RBC # BLD AUTO: 3.12 M/UL (ref 4.2–5.4)
SODIUM SERPL-SCNC: 140 MMOL/L (ref 136–145)
TRANSFUSION STATUS: NORMAL
TSH SERPL DL<=0.05 MIU/L-ACNC: 0.7 UIU/ML (ref 0.27–4.2)
UNIT DIVISION: 0
UNIT ISSUE DATE/TIME: NORMAL
WBC OTHER # BLD: 6.9 K/UL (ref 4–10.5)

## 2025-03-20 PROCEDURE — 80048 BASIC METABOLIC PNL TOTAL CA: CPT

## 2025-03-20 PROCEDURE — 85018 HEMOGLOBIN: CPT

## 2025-03-20 PROCEDURE — 6370000000 HC RX 637 (ALT 250 FOR IP): Performed by: STUDENT IN AN ORGANIZED HEALTH CARE EDUCATION/TRAINING PROGRAM

## 2025-03-20 PROCEDURE — 2500000003 HC RX 250 WO HCPCS: Performed by: STUDENT IN AN ORGANIZED HEALTH CARE EDUCATION/TRAINING PROGRAM

## 2025-03-20 PROCEDURE — 6370000000 HC RX 637 (ALT 250 FOR IP): Performed by: SPECIALIST

## 2025-03-20 PROCEDURE — 6360000002 HC RX W HCPCS: Performed by: STUDENT IN AN ORGANIZED HEALTH CARE EDUCATION/TRAINING PROGRAM

## 2025-03-20 PROCEDURE — 85014 HEMATOCRIT: CPT

## 2025-03-20 PROCEDURE — 94761 N-INVAS EAR/PLS OXIMETRY MLT: CPT

## 2025-03-20 PROCEDURE — 0DB68ZX EXCISION OF STOMACH, VIA NATURAL OR ARTIFICIAL OPENING ENDOSCOPIC, DIAGNOSTIC: ICD-10-PCS | Performed by: SPECIALIST

## 2025-03-20 PROCEDURE — 2140000000 HC CCU INTERMEDIATE R&B

## 2025-03-20 PROCEDURE — 6360000002 HC RX W HCPCS: Performed by: NURSE ANESTHETIST, CERTIFIED REGISTERED

## 2025-03-20 PROCEDURE — 3609012400 HC EGD TRANSORAL BIOPSY SINGLE/MULTIPLE: Performed by: SPECIALIST

## 2025-03-20 PROCEDURE — 2580000003 HC RX 258: Performed by: NURSE ANESTHETIST, CERTIFIED REGISTERED

## 2025-03-20 PROCEDURE — 3700000001 HC ADD 15 MINUTES (ANESTHESIA): Performed by: SPECIALIST

## 2025-03-20 PROCEDURE — 84443 ASSAY THYROID STIM HORMONE: CPT

## 2025-03-20 PROCEDURE — 85610 PROTHROMBIN TIME: CPT

## 2025-03-20 PROCEDURE — 2709999900 HC NON-CHARGEABLE SUPPLY: Performed by: SPECIALIST

## 2025-03-20 PROCEDURE — 2580000003 HC RX 258: Performed by: STUDENT IN AN ORGANIZED HEALTH CARE EDUCATION/TRAINING PROGRAM

## 2025-03-20 PROCEDURE — 88342 IMHCHEM/IMCYTCHM 1ST ANTB: CPT | Performed by: PATHOLOGY

## 2025-03-20 PROCEDURE — 88305 TISSUE EXAM BY PATHOLOGIST: CPT | Performed by: PATHOLOGY

## 2025-03-20 PROCEDURE — 85025 COMPLETE CBC W/AUTO DIFF WBC: CPT

## 2025-03-20 PROCEDURE — 3700000000 HC ANESTHESIA ATTENDED CARE: Performed by: SPECIALIST

## 2025-03-20 RX ORDER — LIDOCAINE HYDROCHLORIDE 20 MG/ML
INJECTION, SOLUTION EPIDURAL; INFILTRATION; INTRACAUDAL; PERINEURAL
Status: DISCONTINUED | OUTPATIENT
Start: 2025-03-20 | End: 2025-03-20 | Stop reason: SDUPTHER

## 2025-03-20 RX ORDER — DEXTROSE MONOHYDRATE AND SODIUM CHLORIDE 5; .9 G/100ML; G/100ML
INJECTION, SOLUTION INTRAVENOUS CONTINUOUS
Status: DISCONTINUED | OUTPATIENT
Start: 2025-03-20 | End: 2025-03-21

## 2025-03-20 RX ORDER — SODIUM CHLORIDE, SODIUM LACTATE, POTASSIUM CHLORIDE, CALCIUM CHLORIDE 600; 310; 30; 20 MG/100ML; MG/100ML; MG/100ML; MG/100ML
INJECTION, SOLUTION INTRAVENOUS
Status: DISCONTINUED | OUTPATIENT
Start: 2025-03-20 | End: 2025-03-20 | Stop reason: SDUPTHER

## 2025-03-20 RX ORDER — SUCRALFATE 1 G/1
1 TABLET ORAL
Status: DISCONTINUED | OUTPATIENT
Start: 2025-03-20 | End: 2025-03-22 | Stop reason: HOSPADM

## 2025-03-20 RX ORDER — PROPOFOL 10 MG/ML
INJECTION, EMULSION INTRAVENOUS
Status: DISCONTINUED | OUTPATIENT
Start: 2025-03-20 | End: 2025-03-20 | Stop reason: SDUPTHER

## 2025-03-20 RX ADMIN — CALCIUM 500 MG: 500 TABLET ORAL at 09:50

## 2025-03-20 RX ADMIN — Medication 400 MG: at 09:50

## 2025-03-20 RX ADMIN — LEVOTHYROXINE SODIUM 150 MCG: 0.15 TABLET ORAL at 06:06

## 2025-03-20 RX ADMIN — PANTOPRAZOLE SODIUM 40 MG: 40 INJECTION, POWDER, FOR SOLUTION INTRAVENOUS at 09:49

## 2025-03-20 RX ADMIN — PANTOPRAZOLE SODIUM 40 MG: 40 INJECTION, POWDER, FOR SOLUTION INTRAVENOUS at 20:16

## 2025-03-20 RX ADMIN — LIDOCAINE HYDROCHLORIDE 60 MG: 20 INJECTION, SOLUTION EPIDURAL; INFILTRATION; INTRACAUDAL; PERINEURAL at 14:42

## 2025-03-20 RX ADMIN — PROPOFOL 50 MG: 10 INJECTION, EMULSION INTRAVENOUS at 14:42

## 2025-03-20 RX ADMIN — Medication 400 MG: at 20:16

## 2025-03-20 RX ADMIN — SODIUM CHLORIDE, PRESERVATIVE FREE 10 ML: 5 INJECTION INTRAVENOUS at 20:17

## 2025-03-20 RX ADMIN — SODIUM CHLORIDE, POTASSIUM CHLORIDE, SODIUM LACTATE AND CALCIUM CHLORIDE: 600; 310; 30; 20 INJECTION, SOLUTION INTRAVENOUS at 14:32

## 2025-03-20 RX ADMIN — SODIUM CHLORIDE, PRESERVATIVE FREE 10 ML: 5 INJECTION INTRAVENOUS at 09:49

## 2025-03-20 RX ADMIN — METOPROLOL TARTRATE 50 MG: 50 TABLET, FILM COATED ORAL at 20:16

## 2025-03-20 RX ADMIN — METOPROLOL TARTRATE 50 MG: 50 TABLET, FILM COATED ORAL at 09:49

## 2025-03-20 RX ADMIN — SUCRALFATE 1 G: 1 TABLET ORAL at 20:16

## 2025-03-20 RX ADMIN — DEXTROSE AND SODIUM CHLORIDE: 5; 900 INJECTION, SOLUTION INTRAVENOUS at 12:26

## 2025-03-20 RX ADMIN — PREDNISONE 5 MG: 5 TABLET ORAL at 09:50

## 2025-03-20 RX ADMIN — SUCRALFATE 1 G: 1 TABLET ORAL at 18:42

## 2025-03-20 ASSESSMENT — PAIN SCALES - GENERAL
PAINLEVEL_OUTOF10: 0
PAINLEVEL_OUTOF10: 0

## 2025-03-20 ASSESSMENT — PAIN SCALES - WONG BAKER: WONGBAKER_NUMERICALRESPONSE: NO HURT

## 2025-03-20 ASSESSMENT — PAIN - FUNCTIONAL ASSESSMENT: PAIN_FUNCTIONAL_ASSESSMENT: 0-10

## 2025-03-20 NOTE — PROCEDURES
PROCEDURE NOTE  Date: 3/20/2025   Name: Edna CASTRO Magalys  YOB: 1940    Procedures EGD REPORT DICTATED #[id number]

## 2025-03-20 NOTE — CONSULTS
[address] [place] [address]                              CONSULTATION      PATIENT NAME: LISA LUU                  : 1940  MED REC NO: [id number]                      ROOM: [id number]  ACCOUNT NO: [id number]                       ADMIT DATE: 2025  PROVIDER: James Steven MD      CONSULT DATE: 2025    CHIEF COMPLAINT:  History of melenic stools with severe anemia, rule out upper GI bleeding.    HISTORY OF PRESENT ILLNESS:  The patient is an 84-year-old white female with past medical history significant for rheumatoid arthritis; multiple myeloma diagnosed approximately 13 years ago, status post stem cell transplantation at [facility]; history of unprovoked DVT in the left femoral vein in 2024, on Eliquis, last dose approximately 3 months ago; and also history of hypertension, chronic kidney disease, hypothyroidism, who presented to the emergency room on 2025 with generalized weakness, unsteady gait, dizziness, and 4-day history of melenic stools.  In the emergency room, the patient was evaluated and had a chem profile drawn, which showed a BUN of 40, creatinine 1.1, and LFTs were within normal limits, and the CBC showed a WBC count of 7.1, hemoglobin was 7.6, and the platelet count was 137,000.  The patient's last hemoglobin on  was 10.4 g percent.  The patient was transfused with 1 unit of packed RBC.  Repeat hemoglobin is 9.6 g percent.  The patient had a CTA done, and no active bleeding was noted.  However, a 3.9 x 3.2 x 7.6 cm calcified heterogeneous lesion was noted in the left paracolic gutter; rule out hematoma?  The patient is hemodynamically stable and is on Protonix 40 mg b.i.d.  The patient did have an EGD done in the remote past, more than 20 years ago, and also has had multiple colonoscopies done every 5 years and the last colonoscopy again was more than 20 years ago.  The  signs.  MUSCULOSKELETAL SYSTEM:  Shows evidence of degenerative joint disease changes.    LABORATORY DATA:  As above mentioned.    IMPRESSION:  84-year-old white female with multiple comorbidities, on Deltasone for rheumatoid arthritis, presents with 4-day history of melenic stools, generalized weakness, dizziness, and near-syncopal episode, and is noted to be severely anemic with a hemoglobin of 7.6 g percent, rule out gastrointestinal bleeding, source most likely upper gastrointestinal tract; however, lower gastrointestinal bleeding lesion cannot be entirely ruled out.    RECOMMENDATION:    1. Agree with present management with Protonix.  2. Monitor the patient's serial H and H and transfuse on a p.r.n. basis to keep hemoglobin above 7 g%.  3. I will proceed with EGD, and if negative, the patient will need a colonoscopy and small bowel evaluation later if needed.  4. The case and plan have been discussed in detail with the patient, and all of her questions were answered.  5. The case and plan were also discussed with the patient's bedside RN, Emma.          CALLIE ESPINAL MD      D:  03/20/2025 12:09:30     T:  03/20/2025 12:44:18     AR/MISTY  Job #:  [id number]     Doc#:  [id number]

## 2025-03-20 NOTE — ED NOTES
ED TO INPATIENT SBAR HANDOFF    Patient Name: Edna Dowell   :  1940  84 y.o.   Preferred Name  Edna  Family/Caregiver Present no   Restraints no   C-SSRS: Risk of Suicide: No Risk  Sitter no   Sepsis Risk Score        Situation  Chief Complaint   Patient presents with    Fall     Brief Description of Patient's Condition: Patient to the ED for a fall. Patient was initially seen at [facility] and transferred to the ED for admission. Patient was walking at home and she got lightheaded and fell on her L side. She reports having recent black stools. Patient hemoglobin was at 7.6 and is currently receiving a transfusion. Patient CT from falls were negative. Pending a CTA  Mental Status: oriented, alert, coherent, logical, thought processes intact, and able to concentrate and follow conversation  Arrived from: home    Imaging:   XR CHEST PORTABLE   Final Result      CT CERVICAL SPINE WO CONTRAST   Final Result      CT HEAD WO CONTRAST   Final Result      CTA ABDOMEN PELVIS W WO CONTRAST    (Results Pending)     Abnormal labs:   Abnormal Labs Reviewed   CBC WITH AUTO DIFFERENTIAL - Abnormal; Notable for the following components:       Result Value    RBC 2.46 (*)     Hemoglobin 7.6 (*)     Hematocrit 25.6 (*)     .1 (*)     MCHC 29.7 (*)     RDW 16.7 (*)     Platelets 137 (*)     MPV 12.2 (*)     Neutrophils % 81 (*)     Lymphocytes % 13 (*)     Monocytes % 5 (*)     All other components within normal limits   COMPREHENSIVE METABOLIC PANEL - Abnormal; Notable for the following components:    Potassium 5.4 (*)     Anion Gap 8 (*)     Glucose 115 (*)     BUN 40 (*)     Est, Glom Filt Rate 47 (*)     Total Protein 4.9 (*)     Albumin 2.8 (*)     ALT 9 (*)     All other components within normal limits        Background  History:   Past Medical History:   Diagnosis Date    Hypertension     Multiple myeloma (HCC)     Swelling     Thyroid disease        Assessment    Vitals: MEWS Score: 1  Level of Consciousness: Alert (0)    Vitals:    03/19/25 2017 03/19/25 2023 03/19/25 2027 03/19/25 2029   BP: (!) 146/48   114/71   Pulse: 80  81    Resp: 18  15    Temp:    99 °F (37.2 °C)   TempSrc:    Oral   SpO2:   100% 100%   Weight:  72.6 kg (160 lb)         PO Status: Nothing by Mouth    O2 Flow Rate: O2 Device: None (Room air)      Cardiac Rhythm: NS    Last documented pain medication administered: N/A    NIH Score: NIH       Active LDA's:      Pertinent or High Risk Medications/Drips: no   If Yes, please provide details: N/A      Blood Product Administration: yes  If Yes, please provide details: PRBCs    Recommendation    Incomplete orders N/A  Additional Comments: N/A   If any further questions, please call Sending RN at [phone]    Electronically signed by: Electronically signed by Nura Bullock RN on 3/19/2025 at 9:35 PM

## 2025-03-20 NOTE — ANESTHESIA PRE PROCEDURE
Department of Anesthesiology  Preprocedure Note       Name:  Edna Dowell   Age:  84 y.o.  :  1940                                          MRN:  [id number]         Date:  3/20/2025      Surgeon: Surgeon(s):  James Steven MD    Procedure: Procedure(s):  ESOPHAGOGASTRODUODENOSCOPY DIAGNOSTIC ONLY    Medications prior to admission:   Prior to Admission medications    Medication Sig Start Date End Date Taking? Authorizing Provider   acetaminophen (TYLENOL) 500 MG tablet Take 2 tablets by mouth every 6 hours as needed   Yes Bryant Valladares MD   metoprolol tartrate (LOPRESSOR) 50 MG tablet Take 1 tablet by mouth 2 times daily 5/26/11 3/19/25 Yes Bryant Valladares MD   magnesium oxide (MAG-OX) 400 (240 Mg) MG tablet Take 1 tablet by mouth 2 times daily 24  Yes Bryant Valladares MD   furosemide (LASIX) 20 MG tablet Take 1 tablet by mouth daily as needed (weight gain / swelling) 3/23/22  Yes Bryant Valladares MD   potassium chloride (KLOR-CON M) 20 MEQ extended release tablet Take 1 tablet by mouth 2 times daily 23  Yes Bryant Valladares MD   predniSONE (DELTASONE) 2.5 MG tablet Take 2 tablets by mouth daily   Yes Bryant Valladares MD   SYNTHROID 150 MCG tablet Take 1 tablet by mouth Daily 1/10/24 3/19/25 Yes Bryant Valladares MD   calcium carbonate (OSCAL) 500 MG TABS tablet Take 1 tablet by mouth daily  Patient not taking: Reported on 3/19/2025    Bryant Valladares MD   Compression Stockings MISC by Does not apply route 24   Nazario Ha MD   apixaban (ELIQUIS) 5 MG TABS tablet Take 1 tablet by mouth 2 times daily  Patient not taking: Reported on 3/19/2025 8/19/24   Margarito Booker MD   leflunomide (ARAVA) 20 MG tablet Take 0.5 tablets by mouth daily  Patient not taking: Reported on 3/19/2025 5/26/11   Bryant Valladares MD       Current medications:    Current Facility-Administered Medications   Medication Dose Route Frequency Provider Last Rate Last

## 2025-03-20 NOTE — BRIEF OP NOTE
Brief Postoperative Note      Edna Dowell is a 84 y.o. female     Pre-operative Diagnosis: U/ GIT BLEEDING    Post-operative Diagnosis:  2 CM DU WITH PIGMENTED SPOT    Procedure: EGD WITH BX    Anesthesia: MAC    Surgeons/Assistants:James Steven MD     Estimated Blood Loss: NIL    Complications: None    Specimens: were obtained  REC-- CPM / F/U H/H AND PATH REPORT / AVOID NSAIDS    James Steven MD   3/20/2025   2:54 PM

## 2025-03-20 NOTE — ANESTHESIA POSTPROCEDURE EVALUATION
Department of Anesthesiology  Postprocedure Note    Patient: Edna Dowell  MRN: [id number]  YOB: 1940  Date of evaluation: 3/20/2025    Procedure Summary       Date: 03/20/25 Room / Location: [place] [id number] / [facility]    Anesthesia Start: 1421 Anesthesia Stop: 1455    Procedure: ESOPHAGOGASTRODUODENOSCOPY BIOPSY Diagnosis:       Gastrointestinal hemorrhage, unspecified gastrointestinal hemorrhage type      (Gastrointestinal hemorrhage, unspecified gastrointestinal hemorrhage type [K92.2])    Surgeons: James Steven MD Responsible Provider: Ian Coy MD    Anesthesia Type: MAC ASA Status: 3            Anesthesia Type: No value filed.    Jacqueline Phase I: Jacqueline Score: 10    Jacqueline Phase II:      Anesthesia Post Evaluation    Patient location during evaluation: PACU  Patient participation: complete - patient participated  Level of consciousness: awake and alert  Pain score: 1  Airway patency: patent  Nausea & Vomiting: no nausea and no vomiting  Cardiovascular status: hemodynamically stable  Respiratory status: nasal cannula  Hydration status: euvolemic  Pain management: adequate    No notable events documented.

## 2025-03-20 NOTE — CONSULTS
[facility] Nurse  Consult Note       Edna Dowell  AGE: 84 y.o.   GENDER: female  : 1940  TODAY'S DATE:  3/20/2025    Subjective:     Reason for Evaluation and Assessment: wound care eval.      Edna Dowell is a 84 y.o. female referred by:   [x] Physician  [] Nursing  [] Other:     Wound Identification:  Wound Type: pressure, burn, and malignant wound  Contributing Factors: chronic pressure, decreased mobility, and malnutrition, CKD        PAST MEDICAL HISTORY        Diagnosis Date    Hypertension     Multiple myeloma (HCC)     Swelling     Thyroid disease        PAST SURGICAL HISTORY    Past Surgical History:   Procedure Laterality Date    APPENDECTOMY      CHOLECYSTECTOMY      COLON SURGERY      HYSTERECTOMY (CERVIX STATUS UNKNOWN)      INVASIVE VASCULAR N/A 2024    Venogram lower ext bilat performed by Nazario Ha MD at [facility] CARDIAC CATH LAB       FAMILY HISTORY    History reviewed. No pertinent family history.    SOCIAL HISTORY    Social History     Tobacco Use    Smoking status: Never    Smokeless tobacco: Never   Vaping Use    Vaping status: Never Used   Substance Use Topics    Alcohol use: Not Currently    Drug use: Never       ALLERGIES    Allergies   Allergen Reactions    Ampicillin Itching and Rash    Meperidine Other (See Comments)     Low blood pressure    Other reaction(s): Other - comment required   Low blood pressure    Meperidine Hcl Other (See Comments)     Other Reaction(s): Unknown    Other Other (See Comments)     112.9KG///98ISW15///DB<br/>Reaction(s): Unknown; Note: 112.9KG///89RYO91///DB    Methotrexate Rash    Sulfa Antibiotics Hives, Other (See Comments) and Rash     HIVES,[id number]       MEDICATIONS    No current facility-administered medications on file prior to encounter.     Current Outpatient Medications on File Prior to Encounter   Medication Sig Dispense Refill    acetaminophen (TYLENOL) 500 MG tablet Take 2 tablets by mouth every 6 hours as needed    03/19/2025 04:11 PM    AST 19 03/19/2025 04:11 PM    ALT 9 03/19/2025 04:11 PM     Albumin:  No results found for: \"LABALBU\"  PT/INR:    Lab Results   Component Value Date/Time    PROTIME 13.3 03/20/2025 05:00 AM    INR 1.0 03/20/2025 05:00 AM     HgBA1c:    Lab Results   Component Value Date/Time    LABA1C 5.8 07/18/2024 01:56 AM         Assessment:     Patient Active Problem List   Diagnosis    Leg DVT (deep venous thromboembolism), acute, left (HCC)    Thrombocytopenia, unspecified    History of DVT (deep vein thrombosis)    Generalized weakness    Moderate malnutrition    GI bleed       Measurements:  Wound 11/22/24 Buttocks Medial excoriated, open areas unstageable cluster (Active)   Wound Image   03/20/25 0957   Wound Etiology Pressure Unstageable 03/20/25 0957   Dressing Status New dressing applied 03/20/25 0957   Wound Cleansed Cleansed with saline 03/20/25 0957   Dressing/Treatment Honey gel/honey paste;Collagen;Silicone border 03/20/25 0957   Wound Length (cm) 0.3 cm 03/20/25 0957   Wound Width (cm) 0.3 cm 03/20/25 0957   Wound Depth (cm) 0.2 cm 03/20/25 0957   Wound Surface Area (cm^2) 0.09 cm^2 03/20/25 0957   Change in Wound Size % (l*w) 93.57 03/20/25 0957   Wound Volume (cm^3) 0.018 cm^3 03/20/25 0957   Wound Healing % 94 03/20/25 0957   Distance Tunneling (cm) 0 cm 03/20/25 0957   Tunneling Position ___ O'Clock 0 03/20/25 0957   Undermining Starts ___ O'Clock 0 03/20/25 0957   Undermining Ends___ O'Clock 0 03/20/25 0957   Undermining Maxium Distance (cm) 0 03/20/25 0957   Wound Assessment Slough;[place]/red 03/20/25 0957   Drainage Amount Small (< 25%) 03/20/25 0957   Drainage Description Serosanguinous 03/20/25 0957   Odor None 03/20/25 0957   Lisa-wound Assessment Blanchable erythema 03/20/25 0957   Margins Defined edges 03/20/25 0957   Wound Thickness Description not for Pressure Injury Full thickness 03/20/25 0957   Number of days: 118       Wound 02/28/25 Neck Posterior (Active)   Wound Image

## 2025-03-20 NOTE — PROGRESS NOTES
Comprehensive Nutrition Assessment    Type and Reason for Visit:  Initial, Positive nutrition screen (MST 3 (poor appetite, wt loss))    Nutrition Recommendations/Plan:   Continue GI bland diet   Offer clear liquid oral nutrition supplement BID and fortified gelatein oral nutrition supplements   Obtain measured weights   Please document all PO intakes in I/O      Malnutrition Assessment:  Malnutrition Status:  At risk for malnutrition (hx moderate malnutrition) (03/20/25 1534)    Context:  Social/Environmental Circumstances     Findings of the 6 clinical characteristics of malnutrition:  Energy Intake:  Unable to assess  Weight Loss:  Greater than 7.5% over 3 months (11% in 4 months)     Body Fat Loss:  Unable to assess     Muscle Mass Loss:  Unable to assess    Fluid Accumulation:  No fluid accumulation     Strength:  Not Performed    Nutrition Assessment:    Admitted with UGIB, s/p EGD with bx. Post op findings of duodenal ulcer, started on GI bland diet. Pt was OOR at time of visit. Hx of wounds and 4 day hx of bloody stool pta. Will provide nutrition supplements and monitor as high nutrition risk as pt with hx of malnutrition dx within the past month.    Nutrition Related Findings:    H/o HTN, CKD, MM s/p stem cell transplant at [facility], Thrombocytopenia. +Oscal, synthroid, mag ox, protonix, deltasone, carafate Wound Type: Multiple, Pressure Injury, Unstageable, Burns       Current Nutrition Intake & Therapies:    Average Meal Intake: NPO  Average Supplements Intake: None Ordered  ADULT DIET; Regular; GI [place] (GERD/Peptic Ulcer)    Anthropometric Measures:  Height: 162.6 cm (5' 4.02\")  Ideal Body Weight (IBW): 120 lbs (55 kg)    Admission Body Weight: 74.8 kg (164 lb 14.5 oz)  Current Body Weight: 74.8 kg (164 lb 14.5 oz), 137.4 % IBW. Weight Source: Bed scale  Current BMI (kg/m2): 28.3  Usual Body Weight: 84.4 kg (186 lb) (11/22/24)     % Weight Change (Calculated): -11.3  Weight Adjustment For: No  Adjustment                 BMI Categories: Overweight (BMI 25.0-29.9)    Estimated Daily Nutrient Needs:  Energy Requirements Based On: Formula  Weight Used for Energy Requirements: Current  Energy (kcal/day): 8574-1710 (MSJ)  Weight Used for Protein Requirements: Ideal  Protein (g/day): 65-82 (1.2-1.5 g/kg)  Method Used for Fluid Requirements: 1 ml/kcal  Fluid (ml/day): 1500    Nutrition Diagnosis:   Predicted inadequate energy intake related to acute injury/trauma, altered GI function, Altered GI structure (duodenal ulcer) as evidenced by NPO or clear liquid status due to medical condition, GI abnormality, poor intake prior to admission, weight loss (positive nutrition screen)    Nutrition Interventions:   Food and/or Nutrient Delivery: Continue Current Diet, Start Oral Nutrition Supplement  Nutrition Education/Counseling: Education/Counseling needed  Coordination of Nutrition Care: Continue to monitor while inpatient, Coordination of Care  Plan of Care discussed with: [email or web address]    Goals:  Goals: PO intake 50% or greater  Type of Goal: New goal       Nutrition Monitoring and Evaluation:   Behavioral-Environmental Outcomes: None Identified  Food/Nutrient Intake Outcomes: Progression of Nutrition, Food and Nutrient Intake, Supplement Intake, Diet Advancement/Tolerance  Physical Signs/Symptoms Outcomes: Biochemical Data, GI Status, Meal Time Behavior, Nutrition Focused Physical Findings, Skin, Weight    Discharge Planning:    Too soon to determine     Jayesh Mendoza RD, LD  Contact: [phone]

## 2025-03-20 NOTE — PROGRESS NOTES
4 Eyes Skin Assessment     NAME:  Edna Dowell  YOB: 1940  MEDICAL RECORD NUMBER:  [id number]    The patient is being assessed for  Admission    I agree that at least one RN has performed a thorough Head to Toe Skin Assessment on the patient. ALL assessment sites listed below have been assessed.      Areas assessed by both nurses:    Head, Face, Ears, Shoulders, Back, Chest, Arms, Elbows, Hands, Sacrum. Buttock, Coccyx, Ischium, Legs. Feet and Heels, and Under Medical Devices         Does the Patient have a Wound? Yes wound(s) were present on assessment. LDA wound assessment was Initiated and completed by RN       Hector Prevention initiated by RN: Yes  Wound Care Orders initiated by RN: Yes    Pressure Injury (Stage 3,4, Unstageable, DTI, NWPT, and Complex wounds) if present, place Wound referral order by RN under : No    New Ostomies, if present place, Ostomy referral order under : No     Nurse 1 eSignature: Electronically signed by Adrian Briscoe RN on 3/20/25 at 12:55 AM EDT    **SHARE this note so that the co-signing nurse can place an eSignature**    Nurse 2 eSignature: Electronically signed by Roya Garcia RN on 3/20/25 at 12:58 AM EDT

## 2025-03-20 NOTE — PROGRESS NOTES
Progress Note      Name:  Edna Dowell /Age/Sex: 1940  (84 y.o. female)   MRN & CSN:  [id number] & [id number] Encounter Date/Time: 3/20/2025 2:23 PM   Location:  ENDO/NONE PCP: Bob Abraham MD       Hospital Day: 2    Assessment and Plan:     Patient is a 84 y.o. female who presented with melena.     # GI bleed  # Acute blood loss anemia  - Reported worsening fatigue and melanotic stool over past week. No other source of active bleeding. Not on anticoagulants (last use of Eliquis over a month prior), antiplatelets or NSAIDs. No previous GIB or recent EGD/colonoscopy.  - Hemodynamically stable, Hg 7.6, baseline ~11. BUN:Cr mildly elevated (36). CTA negative for active bleeding, but possible old hematoma or calcified mass of 3.9 x 3.2 x 7.6 cm in left paracolic gutter.  - Received x1 u pRBC in ED. Follow-up repeat labs and anemia panel, transfuse if Hg <7 (consented), continue PPI BID. GI consulted, appreciate assistance.    GI note reviewed - Getting EGD this afternoon. Continue to trend H/H    # MM in remission  # Thrombocytopenia, chronic  - Hx of stem cell transplant many years prior. Has been in remission for over 10 years. Followed by H/O at [facility].     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      # Hyperkalemia  - Mild, on daily K supplements, hold.      # Rheumatoid arthritis   - Continue prednisone, not on Arava currently.      # Essential hypertension  - Continue Lopressor.    # CKD3b  - Labs overall stable, avoid nephrotoxic medications.       # Acquired hypothyroidism  - Continue Synthroid.  - Follow-up repeat TSH.    Checklist:  Advanced care planning: full  Diet: CLD, NPO past midnight pending GI evaluation  VTE ppx: SCD     Disposition: admit to inpatient.  Estimated discharge: 3-4 day(s).  Current living situation: home.  Expected disposition: home.    Spoke with ED provider who recommended admission for the patient and I agree

## 2025-03-20 NOTE — PLAN OF CARE
Problem: Discharge Planning  Goal: Discharge to home or other facility with appropriate resources  Outcome: Progressing     Problem: Skin/Tissue Integrity  Goal: Skin integrity remains intact  Outcome: Progressing     Problem: Safety - Adult  Goal: Free from fall injury  Outcome: Progressing     Problem: ABCDS Injury Assessment  Goal: Absence of physical injury  Outcome: Progressing

## 2025-03-20 NOTE — OP NOTE
[address] [place] [address]                            OPERATIVE REPORT      PATIENT NAME: LISA LUU                  : 1940  MED REC NO: [id number]                      ROOM: [facility]  ACCOUNT NO: [id number]                       ADMIT DATE: 2025  PROVIDER: James Steven MD      DATE OF PROCEDURE:  2025    SURGEON:  James Steven MD    PROCEDURE:  Esophagogastroduodenoscopy with biopsy.    CHIEF COMPLAINT:  History of severe anemia and melenic stools; rule out upper GI bleeding.    PREMEDICATION:  Please refer to the anesthesiologist's notes.    DESCRIPTION OF PROCEDURE:  The patient was placed in the left lateral decubitus position and the video Olympus gastroscope was introduced in back of the throat and was advanced into the esophagus.    Esophagus:  The mucosa of the esophagus was unremarkable.  There was no evidence of hyperemia, erosion, ulcer, stricture, Rich's esophagus, or mass lesion.    Stomach:  The fundus, cardia, body, antrum, lesser curvature, greater curvature, and the pyloric regions of the stomach were examined.  The mucosa of the stomach was slightly hyperemic, but no erosion or ulcers were seen.  The gastroscope was retroflexed in the fundus and cardia was carefully examined, and no mass lesions were seen.  No blood recent or old was seen in the lumen of the stomach.    Duodenum:  The 1st and 2nd portions of the duodenum were examined, and a 2 x 1 cm acute duodenal ulcer was noted along the anterior wall with a black pigmented spot noted in the base of the ulcer indicative of recent bleeding.  No blood clot or protruding vessel was noted in the base of the ulcer.  The mucosa surrounding the ulcer was edematous and hyperemic.  Again, no blood recent or old was noted in the lumen of the duodenum.    POSTOPERATIVE DIAGNOSIS:  2 x 1 cm duodenal ulcer noted along the anterior wall with stigmata of recent  bleeding consistent with Joey class 2C.    RECOMMENDATIONS:    1. We will continue the patient on Protonix.  2. The patient has been instructed to avoid NSAIDs.  3. We will start the patient on Carafate suspension also 1 g q.i.d.  4. Monitor the patient's H and H, and transfuse on a p.r.n. basis to keep hemoglobin above 7 g percent.  5. Follow up on above path report, and if positive for Helicobacter pylori, the patient should be treated for Helicobacter pylori.   The patient tolerated the procedure well.  There were no postop complications.  Blood loss during the procedure was nil.          CALLIE ESPINAL MD      D:  03/20/2025 14:59:43     T:  03/20/2025 16:00:20     AR/MISTY  Job #:  [id number]     Doc#:  [id number]

## 2025-03-20 NOTE — PLAN OF CARE
Problem: Discharge Planning  Goal: Discharge to home or other facility with appropriate resources  3/20/2025 1018 by Emma Salazar RN  Outcome: Progressing  3/20/2025 0341 by Adrian Briscoe RN  Outcome: Progressing     Problem: Skin/Tissue Integrity  Goal: Skin integrity remains intact  Description: 1.  Monitor for areas of redness and/or skin breakdown  2.  Assess vascular access sites hourly  3.  Every 4-6 hours minimum:  Change oxygen saturation probe site  4.  Every 4-6 hours:  If on nasal continuous positive airway pressure, respiratory therapy assess nares and determine need for appliance change or resting period  3/20/2025 1018 by Emma Salazar RN  Outcome: Progressing  Flowsheets (Taken 3/20/2025 0814)  Skin Integrity Remains Intact: Monitor for areas of redness and/or skin breakdown  3/20/2025 0341 by Adrian Briscoe RN  Outcome: Progressing     Problem: Safety - Adult  Goal: Free from fall injury  3/20/2025 1018 by Emma Salazar RN  Outcome: Progressing  3/20/2025 0341 by Adrian Briscoe RN  Outcome: Progressing     Problem: ABCDS Injury Assessment  Goal: Absence of physical injury  3/20/2025 1018 by Emma Salazar RN  Outcome: Progressing  3/20/2025 0341 by Adrian Briscoe RN  Outcome: Progressing

## 2025-03-20 NOTE — CARE COORDINATION
03/20/25 0910   Service Assessment   Patient Orientation Alert and Oriented;Person;Place;Situation   Cognition Alert   History Provided By Patient   Primary Caregiver Self   Support Systems Spouse/Significant Other;Family Members   Patient's Healthcare Decision Maker is: Legal Next of Kin   PCP Verified by CM Yes   Last Visit to PCP Within last year   Prior Functional Level Independent in ADLs/IADLs   Current Functional Level Assistance with the following:;Bathing;Mobility   Can patient return to prior living arrangement Unknown at present   Ability to make needs known: Good   Family able to assist with home care needs: Yes   Would you like for me to discuss the discharge plan with any other family members/significant others, and if so, who? No   Condition of Participation: Discharge Planning   The Patient and/or Patient Representative was provided with a Choice of Provider? Patient   The Patient and/Or Patient Representative agree with the Discharge Plan? Yes     CM into see pt to initiate a safe discharge plan. Cm into see, introduced self and explained role of CM. Pt is kind, alert and oriented. Pt lives with her spouse in a two story home with her great grand dtr  19 yrs living on the second floor.   Pt is able to stay on the first floor. Pt with 1 ELLEN. Pt is typically fairly independent at home.  drives and provides transportation to appointments.  obtains groceries and medications from [employer] DME includes grab bars, shower chr, walker. Pt has a PCP and insurance that affords her to obtain medications. Pt currently is active with [facility] and would like to continue when discharged. Pts  is currently in the hospital and CM team will need to follow both for discharge plan.  Pt would benefit from Pt/OT  bryanna CM PS to Dr Delacruz. CM will review recs when completed.   CM provided card and encouraged to call for any needs or concern.   CM is available if any needs arise.

## 2025-03-20 NOTE — ED NOTES
ED TO INPATIENT SBAR HANDOFF    Patient Name: Edna Dowell   :  1940  84 y.o.   Preferred Name  Edna  Family/Caregiver Present no  is in room 3012  Restraints no   C-SSRS: Risk of Suicide: No Risk  Sitter no   Sepsis Risk Score        Situation  Chief Complaint   Patient presents with    Fall     Brief Description of Patient's Condition: Patient from home, went to [facility] ED for fall, on eliquis, states she has bloody stool last week, recently lost a total of 80 lbs over 5 months, room air, uses walker at home, wears brief at home.  is in room 3012.  Mental Status: oriented, alert, coherent, logical, thought processes intact, and able to concentrate and follow conversation  Arrived from: home    Imaging:   XR CHEST PORTABLE   Final Result      CT CERVICAL SPINE WO CONTRAST   Final Result      CT HEAD WO CONTRAST   Final Result      CTA ABDOMEN PELVIS W WO CONTRAST    (Results Pending)     Abnormal labs:   Abnormal Labs Reviewed   CBC WITH AUTO DIFFERENTIAL - Abnormal; Notable for the following components:       Result Value    RBC 2.46 (*)     Hemoglobin 7.6 (*)     Hematocrit 25.6 (*)     .1 (*)     MCHC 29.7 (*)     RDW 16.7 (*)     Platelets 137 (*)     MPV 12.2 (*)     Neutrophils % 81 (*)     Lymphocytes % 13 (*)     Monocytes % 5 (*)     All other components within normal limits   COMPREHENSIVE METABOLIC PANEL - Abnormal; Notable for the following components:    Potassium 5.4 (*)     Anion Gap 8 (*)     Glucose 115 (*)     BUN 40 (*)     Est, Glom Filt Rate 47 (*)     Total Protein 4.9 (*)     Albumin 2.8 (*)     ALT 9 (*)     All other components within normal limits   RETICULOCYTES - Abnormal; Notable for the following components:    Retic Ct Pct 2.7 (*)     All other components within normal limits        Background  History:   Past Medical History:   Diagnosis Date    Hypertension     Multiple myeloma (HCC)     Swelling     Thyroid disease        Assessment    Vitals: MEWS Score: 1   Level of Consciousness: Alert (0)   Vitals:    03/19/25 2017 03/19/25 2023 03/19/25 2027 03/19/25 2029   BP: (!) 146/48   114/71   Pulse: 80  81    Resp: 18  15    Temp:    99 °F (37.2 °C)   TempSrc:    Oral   SpO2:   100% 100%   Weight:  72.6 kg (160 lb)         PO Status: Nothing by Mouth    O2 Flow Rate: O2 Device: None (Room air)      Cardiac Rhythm: sr    Last documented pain medication administered: none    NIH Score: NIH       Active LDA's:      Pertinent or High Risk Medications/Drips: no   If Yes, please provide details:       Blood Product Administration: yes  If Yes, please provide details:   Transfuse RBC  Transfusing at 162 mL/hr  Unit:  [id number]    Recommendation    Incomplete orders admit orders   Additional Comments:     If any further questions, please call Sending RN at [phone]    Electronically signed by: Electronically signed by Nathaly Bean RN on 3/19/2025 at 9:39 PM

## 2025-03-21 LAB
ALBUMIN SERPL-MCNC: 2.6 G/DL (ref 3.4–5)
ALBUMIN/GLOB SERPL: 1.3 {RATIO} (ref 1.1–2.2)
ALP SERPL-CCNC: 82 U/L (ref 40–129)
ALT SERPL-CCNC: 8 U/L (ref 10–40)
ANION GAP SERPL CALCULATED.3IONS-SCNC: 7 MMOL/L (ref 9–17)
AST SERPL-CCNC: 19 U/L (ref 15–37)
BASOPHILS # BLD: 0.03 K/UL
BASOPHILS NFR BLD: 1 % (ref 0–1)
BILIRUB SERPL-MCNC: 0.2 MG/DL (ref 0–1)
BUN SERPL-MCNC: 27 MG/DL (ref 7–20)
CALCIUM SERPL-MCNC: 7.8 MG/DL (ref 8.3–10.6)
CHLORIDE SERPL-SCNC: 110 MMOL/L (ref 99–110)
CO2 SERPL-SCNC: 22 MMOL/L (ref 21–32)
CREAT SERPL-MCNC: 1.1 MG/DL (ref 0.6–1.2)
EOSINOPHIL # BLD: 0.06 K/UL
EOSINOPHILS RELATIVE PERCENT: 1 % (ref 0–3)
ERYTHROCYTE [DISTWIDTH] IN BLOOD BY AUTOMATED COUNT: 19 % (ref 11.7–14.9)
GFR, ESTIMATED: 48 ML/MIN/1.73M2
GLUCOSE SERPL-MCNC: 116 MG/DL (ref 74–99)
HCT VFR BLD AUTO: 29.2 % (ref 37–47)
HGB BLD-MCNC: 8.8 G/DL (ref 12.5–16)
IMM GRANULOCYTES # BLD AUTO: 0.02 K/UL
IMM GRANULOCYTES NFR BLD: 0 %
LYMPHOCYTES NFR BLD: 1.29 K/UL
LYMPHOCYTES RELATIVE PERCENT: 29 % (ref 24–44)
MCH RBC QN AUTO: 30.1 PG (ref 27–31)
MCHC RBC AUTO-ENTMCNC: 30.1 G/DL (ref 32–36)
MCV RBC AUTO: 100 FL (ref 78–100)
MONOCYTES NFR BLD: 0.46 K/UL
MONOCYTES NFR BLD: 10 % (ref 0–4)
NEUTROPHILS NFR BLD: 59 % (ref 36–66)
NEUTS SEG NFR BLD: 2.67 K/UL
PLATELET # BLD AUTO: 109 K/UL (ref 140–440)
PMV BLD AUTO: 11.6 FL (ref 7.5–11.1)
POTASSIUM SERPL-SCNC: 3.9 MMOL/L (ref 3.5–5.1)
PROT SERPL-MCNC: 4.5 G/DL (ref 6.4–8.2)
RBC # BLD AUTO: 2.92 M/UL (ref 4.2–5.4)
SODIUM SERPL-SCNC: 140 MMOL/L (ref 136–145)
WBC OTHER # BLD: 4.5 K/UL (ref 4–10.5)

## 2025-03-21 PROCEDURE — 97530 THERAPEUTIC ACTIVITIES: CPT

## 2025-03-21 PROCEDURE — 2500000003 HC RX 250 WO HCPCS: Performed by: STUDENT IN AN ORGANIZED HEALTH CARE EDUCATION/TRAINING PROGRAM

## 2025-03-21 PROCEDURE — 97162 PT EVAL MOD COMPLEX 30 MIN: CPT

## 2025-03-21 PROCEDURE — 6370000000 HC RX 637 (ALT 250 FOR IP): Performed by: STUDENT IN AN ORGANIZED HEALTH CARE EDUCATION/TRAINING PROGRAM

## 2025-03-21 PROCEDURE — 6370000000 HC RX 637 (ALT 250 FOR IP): Performed by: SPECIALIST

## 2025-03-21 PROCEDURE — 6360000002 HC RX W HCPCS: Performed by: STUDENT IN AN ORGANIZED HEALTH CARE EDUCATION/TRAINING PROGRAM

## 2025-03-21 PROCEDURE — 2580000003 HC RX 258: Performed by: STUDENT IN AN ORGANIZED HEALTH CARE EDUCATION/TRAINING PROGRAM

## 2025-03-21 PROCEDURE — 85025 COMPLETE CBC W/AUTO DIFF WBC: CPT

## 2025-03-21 PROCEDURE — 97166 OT EVAL MOD COMPLEX 45 MIN: CPT

## 2025-03-21 PROCEDURE — 80053 COMPREHEN METABOLIC PANEL: CPT

## 2025-03-21 PROCEDURE — 97535 SELF CARE MNGMENT TRAINING: CPT

## 2025-03-21 PROCEDURE — 94761 N-INVAS EAR/PLS OXIMETRY MLT: CPT

## 2025-03-21 PROCEDURE — 2140000000 HC CCU INTERMEDIATE R&B

## 2025-03-21 RX ADMIN — SUCRALFATE 1 G: 1 TABLET ORAL at 12:42

## 2025-03-21 RX ADMIN — Medication 400 MG: at 08:01

## 2025-03-21 RX ADMIN — METOPROLOL TARTRATE 50 MG: 50 TABLET, FILM COATED ORAL at 08:01

## 2025-03-21 RX ADMIN — SODIUM CHLORIDE, PRESERVATIVE FREE 10 ML: 5 INJECTION INTRAVENOUS at 23:36

## 2025-03-21 RX ADMIN — LEVOTHYROXINE SODIUM 150 MCG: 0.15 TABLET ORAL at 06:03

## 2025-03-21 RX ADMIN — SUCRALFATE 1 G: 1 TABLET ORAL at 18:01

## 2025-03-21 RX ADMIN — SUCRALFATE 1 G: 1 TABLET ORAL at 23:29

## 2025-03-21 RX ADMIN — MELATONIN TAB 3 MG 3 MG: 3 TAB at 23:29

## 2025-03-21 RX ADMIN — METOPROLOL TARTRATE 50 MG: 50 TABLET, FILM COATED ORAL at 23:29

## 2025-03-21 RX ADMIN — SODIUM CHLORIDE, PRESERVATIVE FREE 10 ML: 5 INJECTION INTRAVENOUS at 08:01

## 2025-03-21 RX ADMIN — Medication 400 MG: at 23:29

## 2025-03-21 RX ADMIN — PANTOPRAZOLE SODIUM 40 MG: 40 INJECTION, POWDER, FOR SOLUTION INTRAVENOUS at 23:28

## 2025-03-21 RX ADMIN — CALCIUM 500 MG: 500 TABLET ORAL at 08:01

## 2025-03-21 RX ADMIN — PANTOPRAZOLE SODIUM 40 MG: 40 INJECTION, POWDER, FOR SOLUTION INTRAVENOUS at 08:01

## 2025-03-21 RX ADMIN — DEXTROSE AND SODIUM CHLORIDE: 5; 900 INJECTION, SOLUTION INTRAVENOUS at 06:14

## 2025-03-21 RX ADMIN — SUCRALFATE 1 G: 1 TABLET ORAL at 06:03

## 2025-03-21 RX ADMIN — PREDNISONE 5 MG: 5 TABLET ORAL at 08:01

## 2025-03-21 NOTE — PLAN OF CARE
Problem: Discharge Planning  Goal: Discharge to home or other facility with appropriate resources  3/20/2025 2222 by Adrian Briscoe RN  Outcome: Progressing  3/20/2025 1018 by Emma Salazar RN  Outcome: Progressing     Problem: Skin/Tissue Integrity  Goal: Skin integrity remains intact  3/20/2025 2222 by Adrian Briscoe RN  Outcome: Progressing  3/20/2025 1018 by Emma Salazar RN  Outcome: Progressing  Flowsheets (Taken 3/20/2025 0814)  Skin Integrity Remains Intact: Monitor for areas of redness and/or skin breakdown     Problem: Safety - Adult  Goal: Free from fall injury  3/20/2025 2222 by Adrian Briscoe RN  Outcome: Progressing  3/20/2025 1018 by Emma Salazar RN  Outcome: Progressing     Problem: ABCDS Injury Assessment  Goal: Absence of physical injury  3/20/2025 2222 by Adrian Briscoe RN  Outcome: Progressing  3/20/2025 1018 by Emma Salazar RN  Outcome: Progressing     Problem: Pain  Goal: Verbalizes/displays adequate comfort level or baseline comfort level  Outcome: Progressing     Problem: Nutrition Deficit:  Goal: Optimize nutritional status  Outcome: Progressing

## 2025-03-21 NOTE — PROGRESS NOTES
Occupational Therapy  [facility] ACUTE CARE OCCUPATIONAL THERAPY EVALUATION    Edna Dowell, 1940, 3102/3102-A, 3/21/2025    Discharge Recommendation: Facility for moderate post-acute rehabilitation, anticipate 1-2 hours per day and 5 days per week.        History:  [place]:  The primary encounter diagnosis was Lightheadedness. Diagnoses of Dark stools, Anemia, unspecified type, and Gastrointestinal hemorrhage, unspecified gastrointestinal hemorrhage type were also pertinent to this visit.  Past Medical History:   Diagnosis Date    Hypertension     Multiple myeloma (HCC)     Swelling     Thyroid disease          Subjective:  Patient states: \"Didn't know you'd have to dress a baby today did you?\"  Pain:  reports posterior neck pain limiting activity, did not rate  Communication with other providers: co-eval w/ PT, handoff to RN  Restrictions: General Precautions, Fall Risk    Home Setup/Prior level of function:    Social/Functional History  Lives With: Spouse  Type of Home: [facility]  Home Layout: One level  Home Access: Level entry  Bathroom Toilet: Standard  Bathroom Equipment: Shower chair, Grab bars in shower  Home Equipment: Walker - Rolling, Rollator  Prior Level of Assist for ADLs: Independent  Prior Level of Assist for Ambulation: Independent household ambulator, with or without device (mod I with 4ww)  Prior Level of Assist for Transfers: Independent  Active : No  Patient's  Info:      Examination:  Observation: Seated EOB with PT and nursing staff upon arrival, agreeable to therapy eval.  Vision: wears glasses  Hearing: WFL  Vitals: Stable vitals throughout session on room air      Body Systems and functions:  ROM: WFL   Strength: B UE 4/5 across all major joints   Sensation: WFL  Tone: Normal  Coordination: WFL  Perception: WNL      Cognitive and Psychosocial Functioning:  Overall cognitive status: alert, orientedx3. Decreased safety awareness, decreased insight into deficits.  Marlon, OTR/L  [id number]

## 2025-03-21 NOTE — PROGRESS NOTES
Progress Note      Name:  Edna Dowell /Age/Sex: 1940  (84 y.o. female)   MRN & CSN:  [id number] & [id number] Encounter Date/Time: 3/21/2025 2:44 PM   Location:  [address] [place] [address]-A PCP: Bob Abraham MD       Hospital Day: 3    Assessment and Plan:     Patient is a 84 y.o. female who presented with melena.     Acute diarrhea   Had 4 BM already   Patient complaining of cramps in her abdomen   Obtain C diff and GI PCR. If negative will start on imodium and monitor. Hold DC today    Discussed with CM - wants to go home once medically ready with [facility]     # GI bleed  # Acute blood loss anemia  - Reported worsening fatigue and melanotic stool over past week. No other source of active bleeding. Not on anticoagulants (last use of Eliquis over a month prior), antiplatelets or NSAIDs. No previous GIB or recent EGD/colonoscopy.  - Hemodynamically stable, Hg 7.6, baseline ~11. BUN:Cr mildly elevated (36). CTA negative for active bleeding, but possible old hematoma or calcified mass of 3.9 x 3.2 x 7.6 cm in left paracolic gutter.  - Received x1 u pRBC in ED. Follow-up repeat labs and anemia panel, transfuse if Hg <7 (consented), continue PPI BID. GI consulted, appreciate assistance.    Discussed with GI - ok to DC from his end.  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 Has been in remission for over 10 years. Followed by H/O at [facility].     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      # Hyperkalemia  - Mild, on daily K supplements, hold.      # Rheumatoid arthritis   - Continue prednisone, not on Arava currently.      # Essential hypertension  - Continue Lopressor.    # CKD3b  - Labs overall stable, avoid nephrotoxic medications.       # Acquired hypothyroidism  - Continue Synthroid.  - Follow-up repeat TSH.    Checklist:  Advanced care    Neuro: AAOx3. CNs grossly intact. Normal speech. No focal deficit.   Psych: Good judgement and reason.     Past History:     PMHx:   Past Medical History:   Diagnosis Date    Hypertension     Multiple myeloma (HCC)     Swelling     Thyroid disease        PSHx:   Past Surgical History:   Procedure Laterality Date    APPENDECTOMY      CHOLECYSTECTOMY      COLON SURGERY      HYSTERECTOMY (CERVIX STATUS UNKNOWN)      INVASIVE VASCULAR N/A 7/18/2024    Venogram lower ext bilat performed by Nazario Ha MD at [facility] CARDIAC CATH LAB    UPPER GASTROINTESTINAL ENDOSCOPY N/A 3/20/2025    ESOPHAGOGASTRODUODENOSCOPY BIOPSY performed by James Steven MD at [facility] ENDOSCOPY       Allergies:   Allergies   Allergen Reactions    Ampicillin Itching and Rash    Meperidine Other (See Comments)     Low blood pressure    Other reaction(s): Other - comment required   Low blood pressure    Meperidine Hcl Other (See Comments)     Other Reaction(s): Unknown    Other Other (See Comments)     112.9KG///90ISD98///DB<br/>Reaction(s): Unknown; Note: 112.9KG///34NBL96///DB    Methotrexate Rash    Sulfa Antibiotics Hives, Other (See Comments) and Rash     HIVES,[id number]       FHx: family history is not on file.    SHx:   Social History     Socioeconomic History    Marital status:      Spouse name: None    Number of children: None    Years of education: None    Highest education level: None   Tobacco Use    Smoking status: Never    Smokeless tobacco: Never   Vaping Use    Vaping status: Never Used   Substance and Sexual Activity    Alcohol use: Not Currently    Drug use: Never     Social Drivers of Health     Food Insecurity: No Food Insecurity (3/19/2025)    Hunger Vital Sign     Worried About Running Out of Food in the Last Year: Never true     Ran Out of Food in the Last Year: Never true   Transportation Needs: No Transportation Needs (3/19/2025)    PRAPARE - Transportation     Lack of Transportation (Medical): No     Lack of

## 2025-03-21 NOTE — CONSULTS
[facility] ACUTE CARE PHYSICAL THERAPY EVALUATION  Edna Dowell, 1940, 3102/3102-A, 3/21/2025    History  [place]:  The primary encounter diagnosis was Lightheadedness. Diagnoses of Dark stools, Anemia, unspecified type, and Gastrointestinal hemorrhage, unspecified gastrointestinal hemorrhage type were also pertinent to this visit.  Patient  has a past medical history of Hypertension, Multiple myeloma (HCC), Swelling, and Thyroid disease.  Patient  has a past surgical history that includes Hysterectomy; Colon surgery; Appendectomy; Cholecystectomy; invasive vascular (N/A, 7/18/2024); and Upper gastrointestinal endoscopy (N/A, 3/20/2025).    Discharge Recommendation: Facility for moderate post-acute rehabilitation, anticipate 1-2 hours per day and 5 days per week.     Equipment: TBD at next level of care    Subjective:    Patient states:  \"Girls, don't get old.\"      Pain:  denies pain initially, neck pain reported after mobility, does not numerically rate.      Communication with other providers:  Handoff to RN, OT    Restrictions: general precautions, fall risk    Home Setup/Prior level of function  Lives With: Spouse  Type of Home: Condo  Home Layout: One level  Home Access: Level entry  Bathroom Toilet: Standard  Bathroom Equipment: Shower chair, Grab bars in shower  Home Equipment: Walker - Rolling, Rollator  Prior Level of Assist for ADLs: Independent  Prior Level of Assist for Ambulation: Independent household ambulator, with or without device (mod I with 4ww)  Prior Level of Assist for Transfers: Independent  Active : No  Patient's  Info:      Examination of body systems (includes body structures/functions, activity/participation limitations):  Observation:  pt sitting EOB with student RN present upon arrival and agreeable to therapy  Vision:  WFL  Hearing:  WFL  Cardiopulmonary:  no O2 needs  Cognition: WFL, see OT/SLP note for further evaluation.    Musculoskeletal  ROM R/L:  treatment minutes: 8  Total time: 16    Electronically signed by:    Domenica Sierra, PT  3/21/2025, 11:16 AM

## 2025-03-21 NOTE — CARE COORDINATION
WB placed for PT/OT recs. LH  1030 pt is  post op day 1 Esophagogastroduodenoscopy with biopsy.   CM will follow with PT/OT     1200 CM into see pt to discuss the recs for SNF. Pt is adamant that she plans to go home. CM also discussed ARU and [facility] Sw[facility] as options. Pt feels week but feels that at home she will be ok with help from her grand dtr that lives upstairs.  is being discharge today also.   Pt agreeable for [facility] to follow. CM called Lavon at [facility] and they will care for pt with PT/OT and SN.   1315 CM back into see pt to confirm her desire to return home. Pt remains the same that she will want to return home with [facility].    When discharge pt will be going home with [facility]  Please fax H/P, D/S.  AVS, order, and face sheet to [phone]  Please call [phone] and inform of discharge

## 2025-03-21 NOTE — CARE COORDINATION
Pt does not meet criteria for ARU. CM messaged and updated Dr Delacruz. Pt informed CM that she has had diarrhea and does not want to go home with it. Dr Delacruz updated. LH

## 2025-03-21 NOTE — PROGRESS NOTES
Pt c/o loose stool during shift. Imodium requested. Provider stated to receive a cdiff sample and provider will reassess for imodium.

## 2025-03-22 VITALS
SYSTOLIC BLOOD PRESSURE: 103 MMHG | OXYGEN SATURATION: 95 % | HEART RATE: 74 BPM | DIASTOLIC BLOOD PRESSURE: 62 MMHG | HEIGHT: 64 IN | TEMPERATURE: 97.5 F | RESPIRATION RATE: 14 BRPM | BODY MASS INDEX: 28.13 KG/M2 | WEIGHT: 164.8 LBS

## 2025-03-22 LAB
ALBUMIN SERPL-MCNC: 2.5 G/DL (ref 3.4–5)
ALBUMIN/GLOB SERPL: 1.6 {RATIO} (ref 1.1–2.2)
ALP SERPL-CCNC: 85 U/L (ref 40–129)
ALT SERPL-CCNC: 8 U/L (ref 10–40)
ANION GAP SERPL CALCULATED.3IONS-SCNC: 7 MMOL/L (ref 9–17)
AST SERPL-CCNC: 18 U/L (ref 15–37)
BILIRUB SERPL-MCNC: <0.2 MG/DL (ref 0–1)
BUN SERPL-MCNC: 21 MG/DL (ref 7–20)
CALCIUM SERPL-MCNC: 7.8 MG/DL (ref 8.3–10.6)
CHLORIDE SERPL-SCNC: 111 MMOL/L (ref 99–110)
CO2 SERPL-SCNC: 23 MMOL/L (ref 21–32)
CREAT SERPL-MCNC: 1 MG/DL (ref 0.6–1.2)
GFR, ESTIMATED: 54 ML/MIN/1.73M2
GLUCOSE SERPL-MCNC: 78 MG/DL (ref 74–99)
POTASSIUM SERPL-SCNC: 4.3 MMOL/L (ref 3.5–5.1)
PROT SERPL-MCNC: 4.1 G/DL (ref 6.4–8.2)
SODIUM SERPL-SCNC: 141 MMOL/L (ref 136–145)

## 2025-03-22 PROCEDURE — 94761 N-INVAS EAR/PLS OXIMETRY MLT: CPT

## 2025-03-22 PROCEDURE — 6370000000 HC RX 637 (ALT 250 FOR IP): Performed by: SPECIALIST

## 2025-03-22 PROCEDURE — 80053 COMPREHEN METABOLIC PANEL: CPT

## 2025-03-22 PROCEDURE — 2500000003 HC RX 250 WO HCPCS: Performed by: STUDENT IN AN ORGANIZED HEALTH CARE EDUCATION/TRAINING PROGRAM

## 2025-03-22 PROCEDURE — 6360000002 HC RX W HCPCS: Performed by: STUDENT IN AN ORGANIZED HEALTH CARE EDUCATION/TRAINING PROGRAM

## 2025-03-22 PROCEDURE — 6370000000 HC RX 637 (ALT 250 FOR IP): Performed by: STUDENT IN AN ORGANIZED HEALTH CARE EDUCATION/TRAINING PROGRAM

## 2025-03-22 RX ORDER — METOPROLOL TARTRATE 50 MG
50 TABLET ORAL 2 TIMES DAILY
Qty: 30 TABLET | Refills: 0 | Status: SHIPPED | OUTPATIENT
Start: 2025-03-22 | End: 2025-04-06

## 2025-03-22 RX ORDER — HEPARIN 100 UNIT/ML
100 SYRINGE INTRAVENOUS ONCE
Status: COMPLETED | OUTPATIENT
Start: 2025-03-22 | End: 2025-03-22

## 2025-03-22 RX ORDER — SUCRALFATE 1 G/1
1 TABLET ORAL
Qty: 120 TABLET | Refills: 3 | Status: SHIPPED | OUTPATIENT
Start: 2025-03-22

## 2025-03-22 RX ORDER — LEVOTHYROXINE SODIUM 150 UG/1
150 TABLET ORAL
Qty: 30 TABLET | Refills: 3 | Status: SHIPPED | OUTPATIENT
Start: 2025-03-23

## 2025-03-22 RX ORDER — PANTOPRAZOLE SODIUM 40 MG/1
40 TABLET, DELAYED RELEASE ORAL 2 TIMES DAILY
Qty: 60 TABLET | Refills: 1 | Status: SHIPPED | OUTPATIENT
Start: 2025-03-22

## 2025-03-22 RX ADMIN — SUCRALFATE 1 G: 1 TABLET ORAL at 07:02

## 2025-03-22 RX ADMIN — PREDNISONE 5 MG: 5 TABLET ORAL at 08:33

## 2025-03-22 RX ADMIN — LEVOTHYROXINE SODIUM 150 MCG: 0.15 TABLET ORAL at 07:02

## 2025-03-22 RX ADMIN — PANTOPRAZOLE SODIUM 40 MG: 40 INJECTION, POWDER, FOR SOLUTION INTRAVENOUS at 08:33

## 2025-03-22 RX ADMIN — Medication 400 MG: at 08:33

## 2025-03-22 RX ADMIN — SUCRALFATE 1 G: 1 TABLET ORAL at 11:31

## 2025-03-22 RX ADMIN — SODIUM CHLORIDE, PRESERVATIVE FREE 10 ML: 5 INJECTION INTRAVENOUS at 08:36

## 2025-03-22 RX ADMIN — HEPARIN 100 UNITS: 100 SYRINGE at 12:55

## 2025-03-22 RX ADMIN — CALCIUM 500 MG: 500 TABLET ORAL at 08:33

## 2025-03-22 NOTE — DISCHARGE SUMMARY
V2.0  Discharge Summary    Name:  Edna Dowell /Age/Sex: 1940 (84 y.o. female)   Admit Date: 3/19/2025  Discharge Date: 3/22/25    MRN & CSN:  [id number] & [id number] Encounter Date and Time 3/22/25 12:49 PM EDT    Attending:  Juan J Frank,* Discharging Provider: Juan J Duong MD       Hospital Course:     Brief HPI: Edna Dowell is a 84 y.o. female who presented with worsening fatigue and melanotic stool over past week. No other source of active bleeding. Not on anticoagulants (last use of Eliquis over a month prior), antiplatelets or NSAIDs. No LOC but had lightheadedness and fall at home on her left side. Denied any fevers, chills, CP, SOB, cough, abdominal pain, C/D or urinary changes. Denied any tobacco or alcohol use.     Brief Problem Based Course:       Patient doing well. Diarrhea has resolved. Will DC on Protonix BID and Carafate. Recommended to follow up with GI has OP. Labs and vitals remain stable     Acute diarrhea - resolved now               Had 4 BM already              Patient complaining of cramps in her abdomen              Obtain C diff and GI PCR. Was not send as diarrhea has resolved               Discussed with CM - wants to go home once medically ready with [facility]      # GI bleed  # Acute blood loss anemia  - Reported worsening fatigue and melanotic stool over past week. No other source of active bleeding. Not on anticoagulants (last use of Eliquis over a month prior), antiplatelets or NSAIDs. No previous GIB or recent EGD/colonoscopy.  - Hemodynamically stable, Hg 7.6, baseline ~11. BUN:Cr mildly elevated (36). CTA negative for active bleeding, but possible old hematoma or calcified mass of 3.9 x 3.2 x 7.6 cm in left paracolic gutter.  Received x1 u pRBC in ED. Follow-up repeat labs and anemia panel, transfuse if Hg <7 (consented), continue PPI BID. GI consulted, appreciate assistance.    Discussed with GI - ok to DC from his end.  EGD showed duodenal ulcer -  Ligament thickening and calcification. Disc space narrowing at every level but most severe at C3-4, C5-6 and C6-7. Uncovertebral osteophyte is also present along with facet osteoarthritis. Neural foraminal stenosis is demonstrated, multiple levels. CENTRAL CANAL: Central spinal canal stenosis is severe MASTOID AIR CELLS: Trace fluid right mastoid air cells. FRACTURE: None PARASPINAL SOFT TISSUES: Lung apices are clear. Port device partially visualized  on the right. IMPRESSION: 1.  No evidence for fracture or dislocation. 2.  Degenerative findings 3.  Central spinal canal stenosis. If there are neurologic symptoms, MRI may provide additional information See above for complete description  Dictated and Electronically Signed By: Derick Ritter MD 3/19/2025 12:40        CT HEAD WO CONTRAST  Result Date: 3/19/2025  CT HEAD WO CONTRAST: 3/19/2025 12:10 REASON FOR EXAM: Trauma Comparison:  None. Technique: CT radiation dose optimization techniques (automated exposure control, use of iterative reconstruction techniques, or adjustment of the mA and/or kV according  to patient size) were used to limit patient radiation dose. Findings:  Topogram: No additional findings Head: Parenchyma: Periventricular and subcortical white matter low attenuation compatible with chronic small vessel ischemic disease. Atrophy: Moderate atrophy Basal ganglia: Symmetric Mass: None Midline shift: None Hemorrhage: None Ventricles: Normal Vasculature: Intracranial arterial calcifications Mastoid air cells: Mastoid air cells are clear. Paranasal sinuses: Left maxillary osseous wall thickening and near total opacification. Calvarium: Intact IMPRESSION: 1.  At least moderate atrophy 2.  Features of chronic small vessel ischemic white matter disease 3.  No definite acute intracranial injury 4.  There is no evidence of hemorrhage, mass lesion or acute infarction. 5.  Paranasal sinus disease with some chronic features  Dictated and Electronically

## 2025-03-22 NOTE — PROGRESS NOTES
DOING WELL NO ABD COMPLAINTS NO ACTIVE GIT BLEEDING PT BEING D/C TODAY  VITALS STABLE   LABS NOTED HB 8.8  WILL CPM F/U AS OUTPT AND PATH REPORT ON H. PYLORI

## 2025-03-24 LAB — SURGICAL PATHOLOGY REPORT: NORMAL

## 2025-04-03 ENCOUNTER — TELEPHONE (OUTPATIENT)
Dept: CARDIOLOGY CLINIC | Age: 85
End: 2025-04-03

## 2025-04-03 NOTE — TELEPHONE ENCOUNTER
[facility]  Left a voice mail asking for the  Office to call patient , she is SOB   W excursion , her  feet & legs are swollen   And is up about 4 lbs , when she was   Admitted she was taken off her lasix.

## 2025-04-21 ENCOUNTER — TELEPHONE (OUTPATIENT)
Dept: CARDIOLOGY CLINIC | Age: 85
End: 2025-04-21

## 2025-04-21 NOTE — PROGRESS NOTES
MRN: [id number]  Name: Edna Dowell  : 1940    Insurance: Payor: MEDICARE /  /  /      Phone #: [phone]  Provider: Nazario Ha MD     Date of Visit: 2025    Reason for visit:  6 MO Recent Hospitalization Date:    Reason for Hospitalization:    Last EKG: 3/25  Type of Device:       Vitals BP HR O2% WT HT ORTHO BP LYING ORTHO BP SITTING ORTHO BP SITTING   Today's Findings           Patients work up- Check List     Testing Last Date Completed Date Expected  ([facility]) Additional Notes    MA to document For provider to complete Either MA or Provider    Carotid Duplex  STAT 1 WK 6 MTH       THIS WK 2 WK 1 YEAR     Cardiac CTA  STAT 1 WK 6 MTH       THIS WK 2 WK 1 YEAR     Cardiac CT Calcium scoring  STAT 1 WK 6 MTH       THIS WK 2 WK 1 YEAR     CTA Chest, Abdomen & Pelvis  STAT 1 WK 6 MTH       THIS WK 2 WK 1 YEAR     CT Chest IV w/ Contrast  STAT 1 WK 6 MTH       THIS WK 2 WK 1 YEAR     CT Chest w/o Contrast  STAT 1 WK 6 MTH       THIS WK 2 WK 1 YEAR     CXR 3/25 STAT 1 WK 6 MTH       THIS WK 2 WK 1 YEAR     ECHO  Stress Complete Limited     MRI- Cardiac  STAT 1 WK 6 MTH       THIS WK 2 WK 1 YEAR     MUGA Scan  STAT 1 WK 6 MTH       THIS WK 2 WK 1 YEAR     Nuclear Stress  Lexiscan Cardiolite     PFT  STAT 1 WK 6 MTH       THIS WK 2 WK 1 YEAR     Treadmill Stress Test  STAT 1 WK 6 MTH       THIS WK 2 WK 1 YEAR     Vascular Duplex NEEDS Lower: Right Left Bilat       Upper: Right Left Bilat     Other Test Not Listed:    Monitors Last Date Completed Day's Request/Ordered     Holter  Short term 24 hours 48 hours      Long term 3 days 7 days 14 days   Event   (1-30 days)      Procedures Last Date Performed Procedure Details Date Expected   Additional Notes    ASD Closure        Carotid Angio        Cardioversion        Heart Cath  R L R&L      Peripheral Angio  R L      PFO Closure        PTCA/PCI        TELMA        TELMA/Cardioversion        Venogram        Tilt Table        Other Type of Procedure:

## 2025-04-22 NOTE — TELEPHONE ENCOUNTER
Patient refused to have the testing completed, patient stated that she is no longer having swelling and does not think the testing is necessary.

## 2025-04-30 ENCOUNTER — APPOINTMENT (OUTPATIENT)
Dept: CT IMAGING | Age: 85
DRG: 872 | End: 2025-04-30
Payer: MEDICARE

## 2025-04-30 ENCOUNTER — HOSPITAL ENCOUNTER (INPATIENT)
Age: 85
LOS: 5 days | Discharge: NURSING, REHAB OR LONG-TERM CARE | DRG: 872 | End: 2025-05-05
Attending: STUDENT IN AN ORGANIZED HEALTH CARE EDUCATION/TRAINING PROGRAM | Admitting: STUDENT IN AN ORGANIZED HEALTH CARE EDUCATION/TRAINING PROGRAM
Payer: MEDICARE

## 2025-04-30 DIAGNOSIS — A41.9 SEVERE SEPSIS (HCC): Primary | ICD-10-CM

## 2025-04-30 DIAGNOSIS — R65.20 SEVERE SEPSIS (HCC): Primary | ICD-10-CM

## 2025-04-30 DIAGNOSIS — E87.6 HYPOKALEMIA: ICD-10-CM

## 2025-04-30 DIAGNOSIS — R11.2 NAUSEA AND VOMITING, UNSPECIFIED VOMITING TYPE: ICD-10-CM

## 2025-04-30 DIAGNOSIS — R60.0 EDEMA OF RIGHT UPPER EXTREMITY: ICD-10-CM

## 2025-04-30 DIAGNOSIS — N30.01 ACUTE CYSTITIS WITH HEMATURIA: ICD-10-CM

## 2025-04-30 DIAGNOSIS — J96.01 ACUTE RESPIRATORY FAILURE WITH HYPOXIA (HCC): ICD-10-CM

## 2025-04-30 DIAGNOSIS — N17.9 AKI (ACUTE KIDNEY INJURY): ICD-10-CM

## 2025-04-30 DIAGNOSIS — R79.89 ELEVATED TROPONIN: ICD-10-CM

## 2025-04-30 DIAGNOSIS — E83.51 HYPOCALCEMIA: ICD-10-CM

## 2025-04-30 PROBLEM — N39.0 SEPSIS SECONDARY TO UTI (HCC): Status: ACTIVE | Noted: 2025-04-30

## 2025-04-30 LAB
ALBUMIN SERPL-MCNC: 2.7 G/DL (ref 3.4–5)
ALBUMIN/GLOB SERPL: 1.2 {RATIO}
ALP SERPL-CCNC: 97 U/L (ref 40–129)
ALT SERPL-CCNC: 9 U/L (ref 10–40)
ANION GAP SERPL CALCULATED.3IONS-SCNC: 17 MMOL/L (ref 9–17)
AST SERPL-CCNC: 31 U/L (ref 15–37)
BACTERIA URNS QL MICRO: ABNORMAL
BASOPHILS # BLD: 0.05 K/UL
BASOPHILS NFR BLD: 1 % (ref 0–1)
BILIRUB DIRECT SERPL-MCNC: 0.4 MG/DL (ref 0–0.3)
BILIRUB INDIRECT SERPL-MCNC: 0.2 MG/DL (ref 0–0.7)
BILIRUB SERPL-MCNC: 0.6 MG/DL (ref 0–1)
BILIRUB UR QL STRIP: ABNORMAL
BNP SERPL-MCNC: 3819 PG/ML (ref 0–450)
BUN SERPL-MCNC: 20 MG/DL (ref 7–20)
CALCIUM SERPL-MCNC: 6.2 MG/DL (ref 8.3–10.6)
CHLORIDE SERPL-SCNC: 102 MMOL/L (ref 99–110)
CLARITY UR: ABNORMAL
CO2 SERPL-SCNC: 23 MMOL/L (ref 21–32)
COLOR UR: YELLOW
CREAT SERPL-MCNC: 1.4 MG/DL (ref 0.6–1.2)
EOSINOPHIL # BLD: 0.03 K/UL
EOSINOPHILS RELATIVE PERCENT: 0 % (ref 0–3)
EPI CELLS #/AREA URNS HPF: ABNORMAL /HPF
ERYTHROCYTE [DISTWIDTH] IN BLOOD BY AUTOMATED COUNT: 18.2 % (ref 11.7–14.9)
GFR, ESTIMATED: 38 ML/MIN/1.73M2
GLOBULIN SER CALC-MCNC: 2.2 G/DL
GLUCOSE SERPL-MCNC: 124 MG/DL (ref 74–99)
GLUCOSE UR STRIP-MCNC: NEGATIVE MG/DL
HCT VFR BLD AUTO: 32.2 % (ref 37–47)
HGB BLD-MCNC: 10 G/DL (ref 12.5–16)
HGB UR QL STRIP.AUTO: ABNORMAL
IMM GRANULOCYTES # BLD AUTO: 0.11 K/UL
IMM GRANULOCYTES NFR BLD: 1 %
KETONES UR STRIP-MCNC: ABNORMAL MG/DL
LACTATE BLDV-SCNC: 2.1 MMOL/L (ref 0.4–2)
LEUKOCYTE ESTERASE UR QL STRIP: ABNORMAL
LIPASE SERPL-CCNC: 23 U/L (ref 13–60)
LYMPHOCYTES NFR BLD: 3.98 K/UL
LYMPHOCYTES RELATIVE PERCENT: 37 % (ref 24–44)
MAGNESIUM SERPL-MCNC: 0.5 MG/DL (ref 1.8–2.4)
MCH RBC QN AUTO: 29.9 PG (ref 27–31)
MCHC RBC AUTO-ENTMCNC: 31.1 G/DL (ref 32–36)
MCV RBC AUTO: 96.4 FL (ref 78–100)
MONOCYTES NFR BLD: 0.81 K/UL
MONOCYTES NFR BLD: 8 % (ref 0–5)
NEUTROPHILS NFR BLD: 54 % (ref 36–66)
NEUTS SEG NFR BLD: 5.88 K/UL
NITRITE UR QL STRIP: NEGATIVE
PH UR STRIP: 6 [PH] (ref 5–8)
PHOSPHATE SERPL-MCNC: 2 MG/DL (ref 2.5–4.9)
PLATELET # BLD AUTO: 129 K/UL (ref 140–440)
PLATELET CONFIRMATION: NORMAL
PLATELET ESTIMATE: ABNORMAL
PMV BLD AUTO: 11.8 FL (ref 7.5–11.1)
POTASSIUM SERPL-SCNC: 2.6 MMOL/L (ref 3.5–5.1)
PROCALCITONIN SERPL-MCNC: 0.27 NG/ML
PROT SERPL-MCNC: 4.9 G/DL (ref 6.4–8.2)
PROT UR STRIP-MCNC: 100 MG/DL
RBC # BLD AUTO: 3.34 M/UL (ref 4.2–5.4)
RBC # BLD: ABNORMAL 10*6/UL
RBC #/AREA URNS HPF: ABNORMAL /HPF
SODIUM SERPL-SCNC: 142 MMOL/L (ref 136–145)
SP GR UR STRIP: 1.02 (ref 1–1.03)
TROPONIN I SERPL HS-MCNC: 62 NG/L (ref 0–14)
UROBILINOGEN UR STRIP-ACNC: 0.2 EU/DL (ref 0–1)
WBC # BLD: NORMAL 10*3/UL
WBC #/AREA URNS HPF: ABNORMAL /HPF
WBC OTHER # BLD: 10.9 K/UL (ref 4–10.5)

## 2025-04-30 PROCEDURE — 1200000000 HC SEMI PRIVATE

## 2025-04-30 PROCEDURE — 6360000002 HC RX W HCPCS: Performed by: STUDENT IN AN ORGANIZED HEALTH CARE EDUCATION/TRAINING PROGRAM

## 2025-04-30 PROCEDURE — 93005 ELECTROCARDIOGRAM TRACING: CPT | Performed by: STUDENT IN AN ORGANIZED HEALTH CARE EDUCATION/TRAINING PROGRAM

## 2025-04-30 PROCEDURE — 83690 ASSAY OF LIPASE: CPT

## 2025-04-30 PROCEDURE — 96367 TX/PROPH/DG ADDL SEQ IV INF: CPT

## 2025-04-30 PROCEDURE — 2580000003 HC RX 258: Performed by: STUDENT IN AN ORGANIZED HEALTH CARE EDUCATION/TRAINING PROGRAM

## 2025-04-30 PROCEDURE — 80053 COMPREHEN METABOLIC PANEL: CPT

## 2025-04-30 PROCEDURE — 6370000000 HC RX 637 (ALT 250 FOR IP): Performed by: STUDENT IN AN ORGANIZED HEALTH CARE EDUCATION/TRAINING PROGRAM

## 2025-04-30 PROCEDURE — 83605 ASSAY OF LACTIC ACID: CPT

## 2025-04-30 PROCEDURE — 87186 SC STD MICRODIL/AGAR DIL: CPT

## 2025-04-30 PROCEDURE — 83735 ASSAY OF MAGNESIUM: CPT

## 2025-04-30 PROCEDURE — 87077 CULTURE AEROBIC IDENTIFY: CPT

## 2025-04-30 PROCEDURE — 85025 COMPLETE CBC W/AUTO DIFF WBC: CPT

## 2025-04-30 PROCEDURE — 83880 ASSAY OF NATRIURETIC PEPTIDE: CPT

## 2025-04-30 PROCEDURE — 82248 BILIRUBIN DIRECT: CPT

## 2025-04-30 PROCEDURE — 81001 URINALYSIS AUTO W/SCOPE: CPT

## 2025-04-30 PROCEDURE — 84145 PROCALCITONIN (PCT): CPT

## 2025-04-30 PROCEDURE — 84100 ASSAY OF PHOSPHORUS: CPT

## 2025-04-30 PROCEDURE — 87086 URINE CULTURE/COLONY COUNT: CPT

## 2025-04-30 PROCEDURE — 96365 THER/PROPH/DIAG IV INF INIT: CPT

## 2025-04-30 PROCEDURE — 2500000003 HC RX 250 WO HCPCS: Performed by: STUDENT IN AN ORGANIZED HEALTH CARE EDUCATION/TRAINING PROGRAM

## 2025-04-30 PROCEDURE — 74176 CT ABD & PELVIS W/O CONTRAST: CPT

## 2025-04-30 PROCEDURE — 87040 BLOOD CULTURE FOR BACTERIA: CPT

## 2025-04-30 PROCEDURE — 99285 EMERGENCY DEPT VISIT HI MDM: CPT

## 2025-04-30 PROCEDURE — 84484 ASSAY OF TROPONIN QUANT: CPT

## 2025-04-30 PROCEDURE — 96375 TX/PRO/DX INJ NEW DRUG ADDON: CPT

## 2025-04-30 RX ORDER — METOPROLOL TARTRATE 50 MG
50 TABLET ORAL 2 TIMES DAILY
COMMUNITY

## 2025-04-30 RX ORDER — SODIUM CHLORIDE, SODIUM LACTATE, POTASSIUM CHLORIDE, CALCIUM CHLORIDE 600; 310; 30; 20 MG/100ML; MG/100ML; MG/100ML; MG/100ML
INJECTION, SOLUTION INTRAVENOUS CONTINUOUS
Status: DISCONTINUED | OUTPATIENT
Start: 2025-04-30 | End: 2025-05-01

## 2025-04-30 RX ORDER — MAGNESIUM SULFATE IN WATER 40 MG/ML
2000 INJECTION, SOLUTION INTRAVENOUS ONCE
Status: COMPLETED | OUTPATIENT
Start: 2025-04-30 | End: 2025-05-01

## 2025-04-30 RX ORDER — MAGNESIUM SULFATE 1 G/100ML
1000 INJECTION INTRAVENOUS ONCE
Status: DISCONTINUED | OUTPATIENT
Start: 2025-04-30 | End: 2025-04-30 | Stop reason: SDUPTHER

## 2025-04-30 RX ORDER — ONDANSETRON 2 MG/ML
4 INJECTION INTRAMUSCULAR; INTRAVENOUS ONCE
Status: COMPLETED | OUTPATIENT
Start: 2025-04-30 | End: 2025-04-30

## 2025-04-30 RX ORDER — CALCIUM CHLORIDE 100 MG/ML
1000 INJECTION INTRAVENOUS; INTRAVENTRICULAR ONCE
Status: DISCONTINUED | OUTPATIENT
Start: 2025-04-30 | End: 2025-04-30

## 2025-04-30 RX ORDER — 0.9 % SODIUM CHLORIDE 0.9 %
500 INTRAVENOUS SOLUTION INTRAVENOUS ONCE
Status: COMPLETED | OUTPATIENT
Start: 2025-04-30 | End: 2025-04-30

## 2025-04-30 RX ORDER — 0.9 % SODIUM CHLORIDE 0.9 %
30 INTRAVENOUS SOLUTION INTRAVENOUS ONCE
Status: DISCONTINUED | OUTPATIENT
Start: 2025-04-30 | End: 2025-04-30

## 2025-04-30 RX ORDER — 0.9 % SODIUM CHLORIDE 0.9 %
1000 INTRAVENOUS SOLUTION INTRAVENOUS ONCE
Status: COMPLETED | OUTPATIENT
Start: 2025-04-30 | End: 2025-04-30

## 2025-04-30 RX ORDER — POTASSIUM CHLORIDE 7.45 MG/ML
10 INJECTION INTRAVENOUS
Status: DISPENSED | OUTPATIENT
Start: 2025-04-30 | End: 2025-04-30

## 2025-04-30 RX ORDER — METOPROLOL TARTRATE 50 MG
50 TABLET ORAL 2 TIMES DAILY
Status: DISCONTINUED | OUTPATIENT
Start: 2025-04-30 | End: 2025-05-01

## 2025-04-30 RX ORDER — MAGNESIUM SULFATE 1 G/100ML
1000 INJECTION INTRAVENOUS ONCE
Status: COMPLETED | OUTPATIENT
Start: 2025-04-30 | End: 2025-04-30

## 2025-04-30 RX ORDER — CALCIUM CHLORIDE 100 MG/ML
1000 INJECTION INTRAVENOUS; INTRAVENTRICULAR ONCE
Status: DISCONTINUED | OUTPATIENT
Start: 2025-04-30 | End: 2025-04-30 | Stop reason: CLARIF

## 2025-04-30 RX ORDER — LEVOTHYROXINE SODIUM 150 UG/1
150 TABLET ORAL
Status: DISCONTINUED | OUTPATIENT
Start: 2025-05-01 | End: 2025-05-05 | Stop reason: HOSPADM

## 2025-04-30 RX ORDER — SUCRALFATE 1 G/1
1 TABLET ORAL
Status: DISCONTINUED | OUTPATIENT
Start: 2025-04-30 | End: 2025-05-05 | Stop reason: HOSPADM

## 2025-04-30 RX ORDER — PANTOPRAZOLE SODIUM 40 MG/1
40 TABLET, DELAYED RELEASE ORAL 2 TIMES DAILY
Status: DISCONTINUED | OUTPATIENT
Start: 2025-04-30 | End: 2025-05-05 | Stop reason: HOSPADM

## 2025-04-30 RX ORDER — MAGNESIUM SULFATE 1 G/100ML
1000 INJECTION INTRAVENOUS ONCE
Status: DISCONTINUED | OUTPATIENT
Start: 2025-04-30 | End: 2025-04-30

## 2025-04-30 RX ADMIN — PANTOPRAZOLE SODIUM 40 MG: 40 TABLET, DELAYED RELEASE ORAL at 21:58

## 2025-04-30 RX ADMIN — CEFEPIME 2000 MG: 2 INJECTION, POWDER, FOR SOLUTION INTRAVENOUS at 14:40

## 2025-04-30 RX ADMIN — SODIUM CHLORIDE, SODIUM LACTATE, POTASSIUM CHLORIDE, AND CALCIUM CHLORIDE: .6; .31; .03; .02 INJECTION, SOLUTION INTRAVENOUS at 22:54

## 2025-04-30 RX ADMIN — POTASSIUM BICARBONATE 40 MEQ: 782 TABLET, EFFERVESCENT ORAL at 21:59

## 2025-04-30 RX ADMIN — ONDANSETRON 4 MG: 2 INJECTION INTRAMUSCULAR; INTRAVENOUS at 13:17

## 2025-04-30 RX ADMIN — POTASSIUM CHLORIDE 10 MEQ: 7.46 INJECTION, SOLUTION INTRAVENOUS at 20:20

## 2025-04-30 RX ADMIN — SODIUM CHLORIDE 1000 MG: 900 INJECTION, SOLUTION INTRAVENOUS at 18:21

## 2025-04-30 RX ADMIN — SUCRALFATE 1 G: 1 TABLET ORAL at 21:58

## 2025-04-30 RX ADMIN — POTASSIUM CHLORIDE 10 MEQ: 7.46 INJECTION, SOLUTION INTRAVENOUS at 23:13

## 2025-04-30 RX ADMIN — MAGNESIUM SULFATE HEPTAHYDRATE 1000 MG: 1 INJECTION, SOLUTION INTRAVENOUS at 15:46

## 2025-04-30 RX ADMIN — SODIUM CHLORIDE 1000 ML: 0.9 INJECTION, SOLUTION INTRAVENOUS at 13:16

## 2025-04-30 RX ADMIN — MAGNESIUM SULFATE HEPTAHYDRATE 2000 MG: 40 INJECTION, SOLUTION INTRAVENOUS at 22:07

## 2025-04-30 RX ADMIN — POTASSIUM CHLORIDE 10 MEQ: 7.46 INJECTION, SOLUTION INTRAVENOUS at 14:43

## 2025-04-30 RX ADMIN — SODIUM CHLORIDE 500 ML: 0.9 INJECTION, SOLUTION INTRAVENOUS at 20:12

## 2025-04-30 RX ADMIN — VANCOMYCIN HYDROCHLORIDE 1750 MG: 1 INJECTION, POWDER, LYOPHILIZED, FOR SOLUTION INTRAVENOUS at 15:26

## 2025-04-30 ASSESSMENT — PAIN SCALES - GENERAL: PAINLEVEL_OUTOF10: 0

## 2025-04-30 ASSESSMENT — PAIN - FUNCTIONAL ASSESSMENT: PAIN_FUNCTIONAL_ASSESSMENT: NONE - DENIES PAIN

## 2025-04-30 NOTE — ED NOTES
Pure wick placed ,pt remarks she likes to use them had one last time in hospital.    Call light within reach

## 2025-04-30 NOTE — ED PROVIDER NOTES
Emergency Department Encounter    Patient: Edna Dowell  MRN: [id number]  : 1940  Date of Evaluation: 2025  ED Provider:  Casey Reyes DO    Triage Chief Complaint:   Nausea    [place]:  Edna Dowell is a 84 y.o. female that presents via EMS from home for nausea and vomiting.  She is apparently been sick for few days, has not eaten much.  Her home health nurse had recommended calling EMS for further evaluation after evaluation today.  Patient denies any abdominal pain.  She was hypoxic on arrival with slight tachypnea but denies any shortness of breath or chest pain.  She denies any cough.  Her only complaint is nausea and vomiting.  Denies any diarrhea associated with this.    MDM:    History from : Patient and EMS    On arrival patient was ill-appearing appears tired but his alert enough to provide full history and answer questions.  She does not seem confused.  Nontender abdominal exam.  She was with SpO2 ranging from 85 to 87%.  She was placed on 2 L nasal cannula.  On workup she had elevated lactic acid and leukocytosis.  At this time consideration made for sepsis and she was given broad-spectrum antibiotics and blood cultures were collected.  I had some concerns for fluid overload as she has a lower extremity edema so we will give her 1 L IV fluids.  She is hypokalemic.  Mag replacement and potassium replacement ordered.  She has an APARNA.  EKG shows PACs with tachycardia but no evidence for acute ischemia but she does have an elevated troponin in the setting of the APARNA.    CT scan of the chest abdomen pelvis obtained which showed no acute abnormalities.  Blood cultures are pending, repeat troponin pending.  Patient ultimately hospitalization for further management.  Will discuss with hospitalist for admission.    ED Course as of 25 1613      1310 EKG interpreted without cardiology.  Sinus rhythm with a rate of 109.  Occasional PAC.  No ST elevation or ST depression.  Nonspecific T  contain errors related to that system including errors in grammar, punctuation, and spelling, as well as words and phrases that may be inappropriate.  Efforts were made to edit the dictations.        Casey eRyes DO  04/30/25 [id number]

## 2025-04-30 NOTE — ED NOTES
Pt resting eyes closed, dimmed lights for comfort. Pt denies additional needs at this  time.  went home. Continue to monitor

## 2025-04-30 NOTE — ED NOTES
ED TO INPATIENT SBAR HANDOFF    Patient Name: Edna Dowell   :  1940  84 y.o.   Preferred Name  Edna  Family/Caregiver Present yes   Restraints no   C-SSRS: Risk of Suicide: No Risk  Sitter no   Sepsis Risk Score Sepsis V2 Risk Score: 35.5         Situation  Chief Complaint   Patient presents with    Nausea     Brief Description of Patient's Condition:    On arrival patient was ill-appearing appears tired but his alert enough to provide full history and answer questions.  She does not seem confused.  Nontender abdominal exam.  She was with SpO2 ranging from 85 to 87%.  She was placed on 2 L nasal cannula.  On workup she had elevated lactic acid and leukocytosis.  At this time consideration made for sepsis and she was given broad-spectrum antibiotics and blood cultures were collected.  I had some concerns for fluid overload as she has a lower extremity edema so we will give her 1 L IV fluids.  She is hypokalemic.  Mag replacement and potassium replacement ordered.  She has an APARNA.  EKG shows PACs with tachycardia but no evidence for acute ischemia but she does have an elevated troponin in the setting of the APARNA.     CT scan of the chest abdomen pelvis obtained which showed no acute abnormalities.  Blood cultures are pending, repeat troponin pending.  Patient ultimately hospitalization for further management.  Will discuss with hospitalist for admission.     Mental Status: oriented, alert, and coherent  Arrived from: home    Imaging:   CT CHEST ABDOMEN PELVIS WO CONTRAST Additional Contrast? None   Final Result        Abnormal labs:   Abnormal Labs Reviewed   BASIC METABOLIC PANEL - Abnormal; Notable for the following components:       Result Value    Potassium 2.6 (*)     Glucose 124 (*)     Creatinine 1.4 (*)     Est, Glom Filt Rate 38 (*)     Calcium 6.2 (*)     All other components within normal limits   CBC WITH AUTO DIFFERENTIAL - Abnormal; Notable for the following components:    WBC 10.9 (*)     RBC 3.34

## 2025-04-30 NOTE — ED NOTES
Pt repositioned up in bed for comfort. Denies further needs at this time. Updates on plan of care. Call light in reach.

## 2025-05-01 LAB
ANION GAP SERPL CALCULATED.3IONS-SCNC: 11 MMOL/L (ref 9–17)
BUN SERPL-MCNC: 18 MG/DL (ref 7–20)
CA-I BLD-SCNC: 0.73 MMOL/L (ref 1.15–1.33)
CALCIUM SERPL-MCNC: 6.4 MG/DL (ref 8.3–10.6)
CHLORIDE SERPL-SCNC: 103 MMOL/L (ref 99–110)
CK SERPL-CCNC: 53 U/L (ref 26–192)
CO2 SERPL-SCNC: 24 MMOL/L (ref 21–32)
CREAT SERPL-MCNC: 1.3 MG/DL (ref 0.6–1.2)
EKG ATRIAL RATE: 121 BPM
EKG DIAGNOSIS: NORMAL
EKG P AXIS: 36 DEGREES
EKG P-R INTERVAL: 160 MS
EKG Q-T INTERVAL: 282 MS
EKG QRS DURATION: 70 MS
EKG QTC CALCULATION (BAZETT): 400 MS
EKG R AXIS: -16 DEGREES
EKG T AXIS: 149 DEGREES
EKG VENTRICULAR RATE: 121 BPM
GFR, ESTIMATED: 39 ML/MIN/1.73M2
GLUCOSE SERPL-MCNC: 124 MG/DL (ref 74–99)
LACTATE BLDV-SCNC: 1.6 MMOL/L (ref 0.4–2)
MAGNESIUM SERPL-MCNC: 1.2 MG/DL (ref 1.8–2.4)
MAGNESIUM SERPL-MCNC: 2.1 MG/DL (ref 1.8–2.4)
PHOSPHATE SERPL-MCNC: 1.5 MG/DL (ref 2.5–4.9)
POTASSIUM SERPL-SCNC: 3.5 MMOL/L (ref 3.5–5.1)
SODIUM SERPL-SCNC: 138 MMOL/L (ref 136–145)
TROPONIN I SERPL HS-MCNC: 53 NG/L (ref 0–14)

## 2025-05-01 PROCEDURE — 84100 ASSAY OF PHOSPHORUS: CPT

## 2025-05-01 PROCEDURE — 93005 ELECTROCARDIOGRAM TRACING: CPT | Performed by: NURSE PRACTITIONER

## 2025-05-01 PROCEDURE — 82550 ASSAY OF CK (CPK): CPT

## 2025-05-01 PROCEDURE — 2500000003 HC RX 250 WO HCPCS: Performed by: NURSE PRACTITIONER

## 2025-05-01 PROCEDURE — 94761 N-INVAS EAR/PLS OXIMETRY MLT: CPT

## 2025-05-01 PROCEDURE — 1200000000 HC SEMI PRIVATE

## 2025-05-01 PROCEDURE — 84484 ASSAY OF TROPONIN QUANT: CPT

## 2025-05-01 PROCEDURE — 80048 BASIC METABOLIC PNL TOTAL CA: CPT

## 2025-05-01 PROCEDURE — 2580000003 HC RX 258: Performed by: NURSE PRACTITIONER

## 2025-05-01 PROCEDURE — 83605 ASSAY OF LACTIC ACID: CPT

## 2025-05-01 PROCEDURE — 6360000002 HC RX W HCPCS: Performed by: STUDENT IN AN ORGANIZED HEALTH CARE EDUCATION/TRAINING PROGRAM

## 2025-05-01 PROCEDURE — 2500000003 HC RX 250 WO HCPCS: Performed by: STUDENT IN AN ORGANIZED HEALTH CARE EDUCATION/TRAINING PROGRAM

## 2025-05-01 PROCEDURE — 83735 ASSAY OF MAGNESIUM: CPT

## 2025-05-01 PROCEDURE — 2580000003 HC RX 258: Performed by: STUDENT IN AN ORGANIZED HEALTH CARE EDUCATION/TRAINING PROGRAM

## 2025-05-01 PROCEDURE — 36415 COLL VENOUS BLD VENIPUNCTURE: CPT

## 2025-05-01 PROCEDURE — 82330 ASSAY OF CALCIUM: CPT

## 2025-05-01 PROCEDURE — 6370000000 HC RX 637 (ALT 250 FOR IP): Performed by: NURSE PRACTITIONER

## 2025-05-01 PROCEDURE — 6360000002 HC RX W HCPCS: Performed by: NURSE PRACTITIONER

## 2025-05-01 PROCEDURE — 6370000000 HC RX 637 (ALT 250 FOR IP): Performed by: STUDENT IN AN ORGANIZED HEALTH CARE EDUCATION/TRAINING PROGRAM

## 2025-05-01 PROCEDURE — 2700000000 HC OXYGEN THERAPY PER DAY

## 2025-05-01 RX ORDER — SODIUM CHLORIDE, SODIUM LACTATE, POTASSIUM CHLORIDE, CALCIUM CHLORIDE 600; 310; 30; 20 MG/100ML; MG/100ML; MG/100ML; MG/100ML
INJECTION, SOLUTION INTRAVENOUS CONTINUOUS
Status: DISPENSED | OUTPATIENT
Start: 2025-05-01 | End: 2025-05-02

## 2025-05-01 RX ORDER — POTASSIUM CHLORIDE 29.8 MG/ML
20 INJECTION INTRAVENOUS PRN
Status: DISCONTINUED | OUTPATIENT
Start: 2025-05-01 | End: 2025-05-05 | Stop reason: HOSPADM

## 2025-05-01 RX ORDER — ACETAMINOPHEN 500 MG
1000 TABLET ORAL EVERY 8 HOURS PRN
Status: DISCONTINUED | OUTPATIENT
Start: 2025-05-01 | End: 2025-05-05 | Stop reason: HOSPADM

## 2025-05-01 RX ORDER — CALCIUM CARBONATE 500 MG/1
500 TABLET, CHEWABLE ORAL 2 TIMES DAILY
Status: DISCONTINUED | OUTPATIENT
Start: 2025-05-01 | End: 2025-05-05 | Stop reason: HOSPADM

## 2025-05-01 RX ORDER — CALCIUM CHLORIDE 100 MG/ML
1000 INJECTION INTRAVENOUS; INTRAVENTRICULAR 2 TIMES DAILY
Status: DISCONTINUED | OUTPATIENT
Start: 2025-05-01 | End: 2025-05-01 | Stop reason: SDUPTHER

## 2025-05-01 RX ORDER — POTASSIUM CHLORIDE 7.45 MG/ML
10 INJECTION INTRAVENOUS ONCE
Status: COMPLETED | OUTPATIENT
Start: 2025-05-01 | End: 2025-05-01

## 2025-05-01 RX ORDER — POTASSIUM CHLORIDE 7.45 MG/ML
10 INJECTION INTRAVENOUS PRN
Status: DISCONTINUED | OUTPATIENT
Start: 2025-05-01 | End: 2025-05-05 | Stop reason: HOSPADM

## 2025-05-01 RX ORDER — ENOXAPARIN SODIUM 100 MG/ML
30 INJECTION SUBCUTANEOUS DAILY
Status: DISCONTINUED | OUTPATIENT
Start: 2025-05-01 | End: 2025-05-05 | Stop reason: HOSPADM

## 2025-05-01 RX ORDER — ONDANSETRON 2 MG/ML
4 INJECTION INTRAMUSCULAR; INTRAVENOUS EVERY 6 HOURS PRN
Status: DISCONTINUED | OUTPATIENT
Start: 2025-05-01 | End: 2025-05-05 | Stop reason: HOSPADM

## 2025-05-01 RX ORDER — METOPROLOL TARTRATE 1 MG/ML
5 INJECTION, SOLUTION INTRAVENOUS ONCE
Status: COMPLETED | OUTPATIENT
Start: 2025-05-01 | End: 2025-05-01

## 2025-05-01 RX ORDER — MAGNESIUM SULFATE IN WATER 40 MG/ML
2000 INJECTION, SOLUTION INTRAVENOUS PRN
Status: DISCONTINUED | OUTPATIENT
Start: 2025-05-01 | End: 2025-05-05 | Stop reason: HOSPADM

## 2025-05-01 RX ORDER — METOPROLOL TARTRATE 50 MG
50 TABLET ORAL 2 TIMES DAILY
Status: DISCONTINUED | OUTPATIENT
Start: 2025-05-01 | End: 2025-05-05 | Stop reason: HOSPADM

## 2025-05-01 RX ADMIN — LEVOTHYROXINE SODIUM 150 MCG: 0.15 TABLET ORAL at 09:10

## 2025-05-01 RX ADMIN — CEFTRIAXONE SODIUM 2000 MG: 2 INJECTION, POWDER, FOR SOLUTION INTRAMUSCULAR; INTRAVENOUS at 09:27

## 2025-05-01 RX ADMIN — METOPROLOL TARTRATE 5 MG: 5 INJECTION INTRAVENOUS at 03:21

## 2025-05-01 RX ADMIN — POTASSIUM CHLORIDE 10 MEQ: 7.46 INJECTION, SOLUTION INTRAVENOUS at 02:04

## 2025-05-01 RX ADMIN — PANTOPRAZOLE SODIUM 40 MG: 40 TABLET, DELAYED RELEASE ORAL at 20:24

## 2025-05-01 RX ADMIN — CALCIUM CHLORIDE 1000 MG: 100 INJECTION, SOLUTION INTRAVENOUS at 16:41

## 2025-05-01 RX ADMIN — ACETAMINOPHEN 1000 MG: 500 TABLET ORAL at 11:08

## 2025-05-01 RX ADMIN — ENOXAPARIN SODIUM 30 MG: 100 INJECTION SUBCUTANEOUS at 09:09

## 2025-05-01 RX ADMIN — SUCRALFATE 1 G: 1 TABLET ORAL at 20:24

## 2025-05-01 RX ADMIN — SODIUM CHLORIDE, SODIUM LACTATE, POTASSIUM CHLORIDE, AND CALCIUM CHLORIDE: .6; .31; .03; .02 INJECTION, SOLUTION INTRAVENOUS at 16:37

## 2025-05-01 RX ADMIN — SODIUM PHOSPHATE, MONOBASIC, MONOHYDRATE AND SODIUM PHOSPHATE, DIBASIC, ANHYDROUS 15 MMOL: 142; 276 INJECTION, SOLUTION INTRAVENOUS at 18:21

## 2025-05-01 RX ADMIN — SUCRALFATE 1 G: 1 TABLET ORAL at 16:47

## 2025-05-01 RX ADMIN — METOPROLOL TARTRATE 50 MG: 50 TABLET, FILM COATED ORAL at 20:24

## 2025-05-01 RX ADMIN — MAGNESIUM SULFATE HEPTAHYDRATE 2000 MG: 40 INJECTION, SOLUTION INTRAVENOUS at 14:12

## 2025-05-01 RX ADMIN — CALCIUM CARBONATE 500 MG: 500 TABLET, CHEWABLE ORAL at 20:24

## 2025-05-01 RX ADMIN — METOPROLOL TARTRATE 50 MG: 50 TABLET, FILM COATED ORAL at 04:52

## 2025-05-01 RX ADMIN — MAGNESIUM SULFATE HEPTAHYDRATE 2000 MG: 40 INJECTION, SOLUTION INTRAVENOUS at 12:19

## 2025-05-01 RX ADMIN — SUCRALFATE 1 G: 1 TABLET ORAL at 11:08

## 2025-05-01 RX ADMIN — SUCRALFATE 1 G: 1 TABLET ORAL at 09:10

## 2025-05-01 RX ADMIN — CALCIUM CARBONATE 500 MG: 500 TABLET, CHEWABLE ORAL at 15:13

## 2025-05-01 RX ADMIN — PANTOPRAZOLE SODIUM 40 MG: 40 TABLET, DELAYED RELEASE ORAL at 09:09

## 2025-05-01 ASSESSMENT — PAIN DESCRIPTION - LOCATION: LOCATION: KNEE;LEG

## 2025-05-01 ASSESSMENT — PAIN SCALES - GENERAL
PAINLEVEL_OUTOF10: 0
PAINLEVEL_OUTOF10: 7

## 2025-05-01 ASSESSMENT — PAIN DESCRIPTION - DESCRIPTORS: DESCRIPTORS: ACHING;BURNING

## 2025-05-01 ASSESSMENT — PAIN DESCRIPTION - ORIENTATION: ORIENTATION: RIGHT;LEFT

## 2025-05-01 NOTE — PROGRESS NOTES
V2.0  [facility]      Name:  Edna Dowell /Age/Sex: 1940  (84 y.o. female)   MRN & CSN:  [id number] & [id number] Encounter Date/Time: 2025 8:32 AM EDT    Location:  [facility] PCP: Bob Abraham MD       Hospital Day: 2    Assessment and Plan:   Edna Dowell is a 84 y.o. female with pmh of  multiple myeloma in remission, chronic thrombocytopenia, amyloidosis, recent GI bleed, RA, hypothyroidism, CKD stage IIIa,  who presents with Sepsis secondary to UTI (HCC)      Plan:    Sepsis POA  Recent GI Illness with N&V   Possibly secondary to UTI vs explained by tachycardia in the setting of multiple electrolyte abnormalities  IV Rocephin follow-up urine and blood cultures  Lactic Acid 2.1>1.6 following IV fluids, will cont LR 75 ml/hr   CT Chest Abd Pelvis: no acute process  Monitor and aggressively replace electrolytes  Discussed with RN if HR sustaining above 130 give IV Lopressor, resume home Lopressor 50 mg twice daily  Urine CX: pending   Blood Cx x 2: NGTD     APARNA on CKD stage IIIa, Cr baseline 1.0   Likely 2/2 N&V  Likely prerenal given elevated urine SG and ketones  Received almost 2.5 L IV fluids, hold and monitor RFTs  sCr 1.4>1.3     Hypocalcemia   -Calcium 6.2>6.4, ionized Ca++ 0.73  -starting calcium chloride 1000 mg IV BID and Tums 500 mg PO BID   -check Calcium and ionized daily     Hypophosphatemia  -Phosphorous 2.0, replace per standing orders  -check level daily     Hypokalemia   -K+ 2.6, replaced and now 3.5   -monitor BMP daily    Hypomagnesemia  -Mag 0.5, replaced with IV  -check Mag levels daily     Elevated troponin and BNP : may be from demand ischemia  Tachycardia, Irregular HR  --160, BNP 3819, Trop 62  -EKG sinus tachycardia 121/min PACs nonspecific ST-T wave changes, no STEMI or equivalent  -Chest pain-free, repeat troponin is 53  -EKG: Sinus tachycardia with PAC's   -Cardiology Consult: appreciate recs   -is on Lopressor 50 mg PO BID   -ECHO: pending       Recent GI bleed  -Stable hemoglobin trend  -Continue Protonix and Carafate     Chronic bilateral lower extremity edema  HAIR hose stockings        Diet ADULT DIET; Regular   DVT Prophylaxis [x] Lovenox, []  Heparin, [] SCDs, [] Ambulation,  [] Eliquis, [] Xarelto  [] Coumadin   Code Status Full Code   Disposition From: Home  Expected Disposition: Home  Estimated Date of Discharge: 2-3 days   Patient requires continued admission due to UTU tx ongoing, Urine CX pending   Surrogate Decision Maker/ POA Spouse- Kenneth Dowell     Personally reviewed Lab Studies and Imaging     Discussed management of the case with Dr. Cecilia Arboleda.     Drugs that require monitoring for toxicity include Lovenox and the method of monitoring was CBC    Subjective:     Chief Complaint: Nausea       Edna Dowell is a 84 y.o. female who presented to the ED  via EMS from home for nausea and vomiting.  She is apparently been sick for few days, has not eaten much.  Her home health nurse had recommended calling EMS for further evaluation after evaluation today.  Patient denies any abdominal pain.  She was hypoxic on arrival with slight tachypnea but denies any shortness of breath or chest pain.  She denies any cough.  Her only complaint is nausea and vomiting.  Denies any diarrhea associated with this.     On exam today she complains of lack of energy and appetite, nausea and fatigue. No chest pain, abd pain, diarrhea, headache, fevers or chills.        Review of Systems:      Pertinent positives and negatives discussed in HPI    Objective:     Intake/Output Summary (Last 24 hours) at 5/1/2025 0832  Last data filed at 4/30/2025 1817  Gross per 24 hour   Intake 1450 ml   Output --   Net 1450 ml        Vitals:   Vitals:    05/01/25 0135 05/01/25 0341 05/01/25 0452 05/01/25 0904   BP:  110/79 110/79 105/66   Pulse: (!) 160 (!) 106 (!) 115 (!) 122   Resp: (!) 33 26  30   Temp:  98.4 °F (36.9 °C)  99.1 °F (37.3 °C)   TempSrc:  Oral  Oral   SpO2:  100%  99%

## 2025-05-01 NOTE — CONSULTS
[facility] Continence Nurse  Consult Note       Edna Dowell  AGE: 84 y.o.   GENDER: female  : 1940  TODAY'S DATE:  2025    Subjective:     Reason for  Evaluation and Assessment: wound care eval.      Edna Dowell is a 84 y.o. female referred by:   [x] Physician  [] Nursing  [] Other:     Wound Identification:  Wound Type: pressure and skin tear  Contributing Factors: edema, chronic pressure, decreased mobility, malnutrition, and incontinence of stool        PAST MEDICAL HISTORY        Diagnosis Date    Hypertension     Multiple myeloma (HCC)     Swelling     Thyroid disease        PAST SURGICAL HISTORY    Past Surgical History:   Procedure Laterality Date    APPENDECTOMY      CHOLECYSTECTOMY      COLON SURGERY      HYSTERECTOMY (CERVIX STATUS UNKNOWN)      INVASIVE VASCULAR N/A 2024    Venogram lower ext bilat performed by Nazario Ha MD at [facility] CARDIAC CATH LAB    UPPER GASTROINTESTINAL ENDOSCOPY N/A 3/20/2025    ESOPHAGOGASTRODUODENOSCOPY BIOPSY performed by James Steven MD at [facility] ENDOSCOPY       FAMILY HISTORY    History reviewed. No pertinent family history.    SOCIAL HISTORY    Social History     Tobacco Use    Smoking status: Never    Smokeless tobacco: Never   Vaping Use    Vaping status: Never Used   Substance Use Topics    Alcohol use: Not Currently    Drug use: Never       ALLERGIES    Allergies   Allergen Reactions    Ampicillin Itching and Rash    Meperidine Other (See Comments)     Low blood pressure    Other reaction(s): Other - comment required   Low blood pressure    Meperidine Hcl Other (See Comments)     Other Reaction(s): Unknown    Other Other (See Comments)     112.9KG///44PBZ49///DB<br/>Reaction(s): Unknown; Note: 112.9KG///64NUS00///DB    Methotrexate Rash    Sulfa Antibiotics Hives, Other (See Comments) and Rash     HIVES,[id number]       MEDICATIONS    No current facility-administered medications on file prior to encounter.     Current Outpatient Medications on

## 2025-05-01 NOTE — H&P
History and Physical      Name:  Edna Dowell /Age/Sex: 1940  (84 y.o. female)   MRN & CSN:  [id number] & [id number] Encounter Date/Time: 2025  9:07 PM EDT   Location:  [address] [place] [address] PCP: Bob Abraham MD       Assessment and Plan:   Edna Dowell is a 84 y.o. female with multiple myeloma in remission, chronic thrombocytopenia, amyloidosis, recent GI bleed, RA, hypothyroidism, CKD stage IIIa presented from [facility] with nausea APARNA and concern for severe sepsis    Severe sepsis POA  Possibly secondary to UTI vs explained by tachycardia in the setting of multiple electrolyte abnormalities  IV Rocephin follow-up urine and blood cultures  Monitor and aggressively replace electrolytes, repeat BMP 0000 was ordered despite discussing with RN has not resulted yet.  Discussed with RN if HR sustaining above 130 give IV Lopressor, resume home Lopressor 50 mg twice daily    APARNA on CKD stage IIIa, Cr baseline 1.0  Likely prerenal given elevated urine SG and ketones  Received almost 2.5 L IV fluids, hold and monitor RFTs    Elevated troponin, 62 likely demand ischemia  Secondary to tachycardia   EKG sinus tachycardia 121/min PACs nonspecific ST-T wave changes, no STEMI or equivalent  Chest pain-free, repeat troponin is still pending    Recent GI bleed  Stable hemoglobin trend  Continue Protonix and Carafate    Chronic bilateral lower extremity edema  HAIR hose stockings    Inpatient MedSurg telemetry  Full code    Disposition:     Current Living situation: Home  Expected Disposition: Home  Estimated D/C: 2 to 3 days    Diet ADULT DIET; Regular   DVT Prophylaxis [x] Lovenox, []  Heparin, [] SCDs, [] Ambulation,  [] Eliquis, [] Xarelto, [] Coumadin   Code Status Full Code   Surrogate Decision Maker/ POA Kenneth     Personally reviewed Lab Studies and Imaging   Imaging that was interpreted personally includes CT chest abdomen and pelvis and results negative for acute intrathoracic or intra-abdominal etiology    History from:  112.9KG///88BLT31///DB    Methotrexate Rash    Sulfa Antibiotics Hives, Other (See Comments) and Rash     HIVES,[id number]     Fam HX: None mentioned  Soc HX:   Social History     Socioeconomic History    Marital status:      Spouse name: None    Number of children: None    Years of education: None    Highest education level: None   Tobacco Use    Smoking status: Never    Smokeless tobacco: Never   Vaping Use    Vaping status: Never Used   Substance and Sexual Activity    Alcohol use: Not Currently    Drug use: Never     Social Drivers of Health     Food Insecurity: No Food Insecurity (5/1/2025)    Hunger Vital Sign     Worried About Running Out of Food in the Last Year: Never true     Ran Out of Food in the Last Year: Never true   Transportation Needs: No Transportation Needs (5/1/2025)    PRAPARE - Transportation     Lack of Transportation (Medical): No     Lack of Transportation (Non-Medical): No   Intimate Partner Violence: Not At Risk (11/22/2024)    Received from [facility]    Humiliation, Afraid, Rape, and Kick questionnaire     Fear of Current or Ex-Partner: No     Emotionally Abused: No     Physically Abused: No     Sexually Abused: No   Housing Stability: Low Risk  (5/1/2025)    Housing Stability Vital Sign     Unable to Pay for Housing in the Last Year: No     Number of Times Moved in the Last Year: 0     Homeless in the Last Year: No       Medications:   Medications:    cefTRIAXone (ROCEPHIN) IV  2,000 mg IntraVENous Q24H    metoprolol tartrate  50 mg Oral BID    levothyroxine  150 mcg Oral QAM AC    pantoprazole  40 mg Oral BID    sucralfate  1 g Oral 4x Daily AC & HS      Infusions:       PRN Meds:      Labs      CBC:   Recent Labs     04/30/25  1320   WBC 10.9*   HGB 10.0*   *     BMP:    Recent Labs     04/30/25  1320      K 2.6*      CO2 23   BUN 20   CREATININE 1.4*   GLUCOSE 124*     Hepatic:   Recent Labs     04/30/25  1320   AST 31   ALT 9*   BILITOT 0.6   ALKPHOS 97

## 2025-05-01 NOTE — PROGRESS NOTES
I did NOT see the patient. The patient was discussed with the SHIVANI. I was available for questions and consultation as needed.       Treating for UTI  Procal 0.27

## 2025-05-01 NOTE — CARE COORDINATION
Chart reviewed. Cm in to see pt at bedside, introduced self and role of case management. Pt is from home with  and granddaughter. Pt lives in a 2 story condo, lives on 1st floor. [address] condo. Pt has a walk-in shower with a SC. Pt uses a cane. Pt does not drive, pt's  drives. Pt has PCP and insurance to assist with medical expenses.     Pt is mod assist. Pt had an admission in March. Per past CM notes, pt was adamant about not going to SNF and went home with HC. Pt is active with [facility]. Pt stated that having HC is going well and that she would like to return home w/ [facility]. No other questions or needs stated at this time. Cm to follow.    05/01/25 1116   Service Assessment   Patient Orientation Alert and Oriented   Cognition Alert   History Provided By Patient;Medical Record   Primary Caregiver Self   Accompanied By/Relationship N/A   Support Systems Spouse/Significant Other;Family Members   Patient's Healthcare Decision Maker is: Legal Next of Kin   PCP Verified by CM Yes   Last Visit to PCP Within last 3 months   Prior Functional Level Independent in ADLs/IADLs   Current Functional Level Assistance with the following:;Bathing;Toileting;Mobility  (Per hospital policy)   Can patient return to prior living arrangement Yes   Ability to make needs known: Good   Family able to assist with home care needs: Yes   Would you like for me to discuss the discharge plan with any other family members/significant others, and if so, who? Yes  (Legal next of kin and family as needed)   Financial Resources Medicare   Community Resources ECF/Home Care   CM/SW Referral Other (see comment)  (Discharge planning assessment)   Condition of Participation: Discharge Planning   The Patient and/or Patient Representative was provided with a Choice of Provider? Patient   The Patient and/Or Patient Representative agree with the Discharge Plan? Yes   Freedom of Choice list was provided with basic dialogue that supports

## 2025-05-01 NOTE — PROGRESS NOTES
4 Eyes Skin Assessment     NAME:  Edna Dowell  YOB: 1940  MEDICAL RECORD NUMBER:  [id number]    The patient is being assessed for  Admission    I agree that at least one RN has performed a thorough Head to Toe Skin Assessment on the patient. ALL assessment sites listed below have been assessed.      Areas assessed by both nurses:    Head, Face, Ears, Shoulders, Back, Chest, Arms, Elbows, Hands, Sacrum. Buttock, Coccyx, Ischium, Legs. Feet and Heels, and Under Medical Devices         Does the Patient have a Wound? No noted wound(s)       Hector Prevention initiated by RN: Yes  Wound Care Orders initiated by RN: Yes    Pressure Injury (Stage 3,4, Unstageable, DTI, NWPT, and Complex wounds) if present, place Wound referral order by RN under : No    New Ostomies, if present place, Ostomy referral order under : No     Nurse 1 eSignature: Electronically signed by Caroline Philip RN on 4/30/25 at 11:49 PM EDT    **SHARE this note so that the co-signing nurse can place an eSignature**    Nurse 2 eSignature: Electronically signed by Kavya Cooper RN on 5/1/25 at 7:27 AM EDT

## 2025-05-01 NOTE — CONSULTS
Comprehensive Nutrition Assessment    Type and Reason for Visit:  Initial, Positive nutrition screen, Consult (poor appetite, wt loss \"60# in past month\"; oral supplements)    Nutrition Recommendations/Plan:   Continue regular diet   Trial variety of oral nutrition supplements  Monitor weights, po intakes, labs, POC     Malnutrition Assessment:  Malnutrition Status:  At risk for malnutrition (05/01/25 1419)    Context:  Social/Environmental Circumstances       Nutrition Assessment:    Admitted w/ sepsis secondary to UTI, with multiple myeloma in remission, chronic thrombocytopenia, amyloidosis, recent GI bleed, RA, hypothyroidism, CKD stage IIIa. Pt sleeping soundly at visit. She reports losing 60# over the past month; unable to confirm per chart review, mild wt loss over the past 9mo. Ate 1-25% x1 documented meal so far. Will offer some oral supplements. Follow at high nutrition risk.    Nutrition Related Findings:    +carafate; Ca 6.4, elevated troponin Wound Type: Deep Tissue Injury       Current Nutrition Intake & Therapies:    Average Meal Intake: 1-25%  Average Supplements Intake: None Ordered  ADULT DIET; Regular    Anthropometric Measures:  Height: 162.6 cm (5' 4\")  Ideal Body Weight (IBW): 120 lbs (55 kg)    Admission Body Weight: 78.7 kg (173 lb 8 oz)  Current Body Weight: 78.7 kg (173 lb 8 oz),   IBW. Weight Source: Bed scale  Current BMI (kg/m2): 29.8  Usual Body Weight: 87.4 kg (192 lb 10.9 oz) (7/17/24)     % Weight Change (Calculated): -10  Weight Adjustment For: No Adjustment                 BMI Categories: Overweight (BMI 25.0-29.9)    Estimated Daily Nutrient Needs:  Energy Requirements Based On: Formula  Weight Used for Energy Requirements: Current  Energy (kcal/day): 4106-3204 (MSJ)  Weight Used for Protein Requirements: Current  Protein (g/day): 79-94 (1.0-1.2g/kg)  Method Used for Fluid Requirements: 1 ml/kcal  Fluid (ml/day): 1800    Nutrition Diagnosis:   Predicted inadequate energy intake

## 2025-05-01 NOTE — CONSULTS
Consult completed. Pt has patent [facility] accessed yesterday that meets her needs and currently is still receiving critical meds/infusions. No other c/o or needs noted or reported.

## 2025-05-02 ENCOUNTER — APPOINTMENT (OUTPATIENT)
Dept: NON INVASIVE DIAGNOSTICS | Age: 85
DRG: 872 | End: 2025-05-02
Payer: MEDICARE

## 2025-05-02 LAB
ANION GAP SERPL CALCULATED.3IONS-SCNC: 11 MMOL/L (ref 9–17)
BASOPHILS # BLD: 0.03 K/UL
BASOPHILS NFR BLD: 1 % (ref 0–1)
BUN SERPL-MCNC: 19 MG/DL (ref 7–20)
CA-I BLD-SCNC: 0.83 MMOL/L (ref 1.15–1.33)
CALCIUM SERPL-MCNC: 7 MG/DL (ref 8.3–10.6)
CHLORIDE SERPL-SCNC: 106 MMOL/L (ref 99–110)
CO2 SERPL-SCNC: 22 MMOL/L (ref 21–32)
CREAT SERPL-MCNC: 1.3 MG/DL (ref 0.6–1.2)
ECHO AO ASC DIAM: 3.6 CM
ECHO AO ASCENDING AORTA INDEX: 1.96 CM/M2
ECHO AO ROOT DIAM: 3.1 CM
ECHO AO ROOT INDEX: 1.68 CM/M2
ECHO AV AREA PEAK VELOCITY: 2 CM2
ECHO AV AREA VTI: 2.4 CM2
ECHO AV AREA/BSA PEAK VELOCITY: 1.1 CM2/M2
ECHO AV AREA/BSA VTI: 1.3 CM2/M2
ECHO AV CUSP MM: 2 CM
ECHO AV MEAN GRADIENT: 3 MMHG
ECHO AV MEAN VELOCITY: 0.9 M/S
ECHO AV PEAK GRADIENT: 6 MMHG
ECHO AV PEAK VELOCITY: 1.2 M/S
ECHO AV VELOCITY RATIO: 0.67
ECHO AV VTI: 20.8 CM
ECHO BSA: 1.88 M2
ECHO LA AREA 4C: 11.3 CM2
ECHO LA DIAMETER INDEX: 1.74 CM/M2
ECHO LA DIAMETER: 3.2 CM
ECHO LA MAJOR AXIS: 4.6 CM
ECHO LA TO AORTIC ROOT RATIO: 1.03
ECHO LA VOL MOD A4C: 23 ML (ref 22–52)
ECHO LA VOLUME INDEX MOD A4C: 13 ML/M2 (ref 16–34)
ECHO LV EF PHYSICIAN: 55 %
ECHO LV FRACTIONAL SHORTENING: 34 % (ref 28–44)
ECHO LV INTERNAL DIMENSION DIASTOLE INDEX: 2.72 CM/M2
ECHO LV INTERNAL DIMENSION DIASTOLIC: 5 CM (ref 3.9–5.3)
ECHO LV INTERNAL DIMENSION SYSTOLIC INDEX: 1.79 CM/M2
ECHO LV INTERNAL DIMENSION SYSTOLIC: 3.3 CM
ECHO LV IVSD: 1.1 CM (ref 0.6–0.9)
ECHO LV MASS 2D: 207.1 G (ref 67–162)
ECHO LV MASS INDEX 2D: 112.6 G/M2 (ref 43–95)
ECHO LV POSTERIOR WALL DIASTOLIC: 1.1 CM (ref 0.6–0.9)
ECHO LV RELATIVE WALL THICKNESS RATIO: 0.44
ECHO LVOT AREA: 3.1 CM2
ECHO LVOT AV VTI INDEX: 0.75
ECHO LVOT DIAM: 2 CM
ECHO LVOT MEAN GRADIENT: 1 MMHG
ECHO LVOT PEAK GRADIENT: 2 MMHG
ECHO LVOT PEAK VELOCITY: 0.8 M/S
ECHO LVOT STROKE VOLUME INDEX: 26.6 ML/M2
ECHO LVOT SV: 49 ML
ECHO LVOT VTI: 15.6 CM
ECHO MV A VELOCITY: 0.79 M/S
ECHO MV E DECELERATION TIME (DT): 197 MS
ECHO MV E VELOCITY: 0.93 M/S
ECHO MV E/A RATIO: 1.18
ECHO RA AREA 4C: 8.1 CM2
ECHO RA END SYSTOLIC VOLUME APICAL 4 CHAMBER INDEX BSA: 7 ML/M2
ECHO RA VOLUME: 12 ML
ECHO RV FREE WALL PEAK S': 13.1 CM/S
ECHO RV MID DIMENSION: 2.3 CM
ECHO RV TAPSE: 1.7 CM (ref 1.7–?)
EKG ATRIAL RATE: 109 BPM
EKG DIAGNOSIS: NORMAL
EKG P AXIS: 44 DEGREES
EKG P-R INTERVAL: 136 MS
EKG Q-T INTERVAL: 306 MS
EKG QRS DURATION: 72 MS
EKG QTC CALCULATION (BAZETT): 412 MS
EKG R AXIS: -13 DEGREES
EKG T AXIS: 155 DEGREES
EKG VENTRICULAR RATE: 109 BPM
EOSINOPHIL # BLD: 0.06 K/UL
EOSINOPHILS RELATIVE PERCENT: 1 % (ref 0–3)
ERYTHROCYTE [DISTWIDTH] IN BLOOD BY AUTOMATED COUNT: 18.6 % (ref 11.7–14.9)
GFR, ESTIMATED: 39 ML/MIN/1.73M2
GLUCOSE SERPL-MCNC: 100 MG/DL (ref 74–99)
HCT VFR BLD AUTO: 31 % (ref 37–47)
HGB BLD-MCNC: 9 G/DL (ref 12.5–16)
IMM GRANULOCYTES # BLD AUTO: 0.04 K/UL
IMM GRANULOCYTES NFR BLD: 1 %
LYMPHOCYTES NFR BLD: 2 K/UL
LYMPHOCYTES RELATIVE PERCENT: 33 % (ref 24–44)
MAGNESIUM SERPL-MCNC: 2.1 MG/DL (ref 1.8–2.4)
MCH RBC QN AUTO: 31.1 PG (ref 27–31)
MCHC RBC AUTO-ENTMCNC: 29 G/DL (ref 32–36)
MCV RBC AUTO: 107.3 FL (ref 78–100)
MONOCYTES NFR BLD: 0.42 K/UL
MONOCYTES NFR BLD: 7 % (ref 0–5)
NEUTROPHILS NFR BLD: 58 % (ref 36–66)
NEUTS SEG NFR BLD: 3.51 K/UL
PHOSPHATE SERPL-MCNC: 2.7 MG/DL (ref 2.5–4.9)
PLATELET, FLUORESCENCE: 111 K/UL (ref 140–440)
PMV BLD AUTO: 13.2 FL (ref 7.5–11.1)
POTASSIUM SERPL-SCNC: 3.2 MMOL/L (ref 3.5–5.1)
RBC # BLD AUTO: 2.89 M/UL (ref 4.2–5.4)
SODIUM SERPL-SCNC: 139 MMOL/L (ref 136–145)
WBC OTHER # BLD: 6.1 K/UL (ref 4–10.5)

## 2025-05-02 PROCEDURE — 6370000000 HC RX 637 (ALT 250 FOR IP): Performed by: STUDENT IN AN ORGANIZED HEALTH CARE EDUCATION/TRAINING PROGRAM

## 2025-05-02 PROCEDURE — 82330 ASSAY OF CALCIUM: CPT

## 2025-05-02 PROCEDURE — 97162 PT EVAL MOD COMPLEX 30 MIN: CPT

## 2025-05-02 PROCEDURE — 6370000000 HC RX 637 (ALT 250 FOR IP): Performed by: NURSE PRACTITIONER

## 2025-05-02 PROCEDURE — 97535 SELF CARE MNGMENT TRAINING: CPT

## 2025-05-02 PROCEDURE — 6360000002 HC RX W HCPCS: Performed by: NURSE PRACTITIONER

## 2025-05-02 PROCEDURE — 83735 ASSAY OF MAGNESIUM: CPT

## 2025-05-02 PROCEDURE — 80048 BASIC METABOLIC PNL TOTAL CA: CPT

## 2025-05-02 PROCEDURE — 2700000000 HC OXYGEN THERAPY PER DAY

## 2025-05-02 PROCEDURE — 93306 TTE W/DOPPLER COMPLETE: CPT | Performed by: INTERNAL MEDICINE

## 2025-05-02 PROCEDURE — 2580000003 HC RX 258: Performed by: NURSE PRACTITIONER

## 2025-05-02 PROCEDURE — 6360000002 HC RX W HCPCS: Performed by: STUDENT IN AN ORGANIZED HEALTH CARE EDUCATION/TRAINING PROGRAM

## 2025-05-02 PROCEDURE — 2500000003 HC RX 250 WO HCPCS: Performed by: NURSE PRACTITIONER

## 2025-05-02 PROCEDURE — 94761 N-INVAS EAR/PLS OXIMETRY MLT: CPT

## 2025-05-02 PROCEDURE — 97530 THERAPEUTIC ACTIVITIES: CPT

## 2025-05-02 PROCEDURE — 93306 TTE W/DOPPLER COMPLETE: CPT

## 2025-05-02 PROCEDURE — 93010 ELECTROCARDIOGRAM REPORT: CPT | Performed by: INTERNAL MEDICINE

## 2025-05-02 PROCEDURE — 85025 COMPLETE CBC W/AUTO DIFF WBC: CPT

## 2025-05-02 PROCEDURE — 99223 1ST HOSP IP/OBS HIGH 75: CPT | Performed by: INTERNAL MEDICINE

## 2025-05-02 PROCEDURE — 2580000003 HC RX 258: Performed by: STUDENT IN AN ORGANIZED HEALTH CARE EDUCATION/TRAINING PROGRAM

## 2025-05-02 PROCEDURE — 36415 COLL VENOUS BLD VENIPUNCTURE: CPT

## 2025-05-02 PROCEDURE — 84100 ASSAY OF PHOSPHORUS: CPT

## 2025-05-02 PROCEDURE — 97166 OT EVAL MOD COMPLEX 45 MIN: CPT

## 2025-05-02 PROCEDURE — 1200000000 HC SEMI PRIVATE

## 2025-05-02 RX ADMIN — POTASSIUM CHLORIDE 20 MEQ: 29.8 INJECTION, SOLUTION INTRAVENOUS at 09:39

## 2025-05-02 RX ADMIN — PANTOPRAZOLE SODIUM 40 MG: 40 TABLET, DELAYED RELEASE ORAL at 21:30

## 2025-05-02 RX ADMIN — SUCRALFATE 1 G: 1 TABLET ORAL at 21:30

## 2025-05-02 RX ADMIN — PANTOPRAZOLE SODIUM 40 MG: 40 TABLET, DELAYED RELEASE ORAL at 07:58

## 2025-05-02 RX ADMIN — POTASSIUM CHLORIDE 20 MEQ: 29.8 INJECTION, SOLUTION INTRAVENOUS at 10:13

## 2025-05-02 RX ADMIN — METOPROLOL TARTRATE 50 MG: 50 TABLET, FILM COATED ORAL at 22:05

## 2025-05-02 RX ADMIN — ACETAMINOPHEN 1000 MG: 500 TABLET ORAL at 22:18

## 2025-05-02 RX ADMIN — SUCRALFATE 1 G: 1 TABLET ORAL at 05:49

## 2025-05-02 RX ADMIN — CALCIUM CHLORIDE 1000 MG: 100 INJECTION, SOLUTION INTRAVENOUS at 08:04

## 2025-05-02 RX ADMIN — LEVOTHYROXINE SODIUM 150 MCG: 0.15 TABLET ORAL at 05:49

## 2025-05-02 RX ADMIN — ENOXAPARIN SODIUM 30 MG: 100 INJECTION SUBCUTANEOUS at 07:58

## 2025-05-02 RX ADMIN — CALCIUM CARBONATE 500 MG: 500 TABLET, CHEWABLE ORAL at 21:30

## 2025-05-02 RX ADMIN — CALCIUM CARBONATE 500 MG: 500 TABLET, CHEWABLE ORAL at 07:58

## 2025-05-02 RX ADMIN — CEFTRIAXONE SODIUM 2000 MG: 2 INJECTION, POWDER, FOR SOLUTION INTRAMUSCULAR; INTRAVENOUS at 08:00

## 2025-05-02 RX ADMIN — METOPROLOL TARTRATE 50 MG: 50 TABLET, FILM COATED ORAL at 07:58

## 2025-05-02 ASSESSMENT — PAIN DESCRIPTION - ORIENTATION: ORIENTATION: LEFT;RIGHT

## 2025-05-02 ASSESSMENT — PAIN SCALES - GENERAL
PAINLEVEL_OUTOF10: 0
PAINLEVEL_OUTOF10: 10

## 2025-05-02 ASSESSMENT — PAIN DESCRIPTION - LOCATION: LOCATION: LEG

## 2025-05-02 NOTE — PROGRESS NOTES
V2.0  [facility] Hospitalist Progress Note      Name:  Edna Dowell /Age/Sex: 1940  (84 y.o. female)   MRN & CSN:  [id number] & [id number] Encounter Date/Time: 2025 8:32 AM EDT    [facility]  [facility] PCP: Bob Abraham MD       Hospital Day: 3    Assessment and Plan:   Edna Dowell is a 84 y.o. female with pmh of  multiple myeloma in remission, chronic thrombocytopenia, amyloidosis, recent GI bleed, RA, hypothyroidism, CKD stage IIIa,  who presents with Sepsis secondary to UTI (HCC)      Plan:    Sepsis POA  Recent GI Illness with N&V   + Klebsiella UTI   IV Rocephin follow-up urine and blood cultures  Lactic Acid 2.1>1.6 following IV fluids, will cont LR 75 ml/hr   CT Chest Abd Pelvis: no acute process  Monitor and aggressively replace electrolytes  Discussed with RN if HR sustaining above 130 give IV Lopressor, resume home Lopressor 50 mg twice daily  Urine CX: + klebsiella pneumoniae, sensitivity pending   Blood Cx x 2: NGTD     APARNA on CKD stage IIIa, Cr baseline 1.0   Likely 2/2 N&V  Likely prerenal given elevated urine SG and ketones  Received almost 2.5 L IV fluids, hold and monitor RFTs  sCr 1.4>1.3     Hypocalcemia   -Calcium 6.2>6.4, ionized Ca++ 0.73>0.83  -starting calcium chloride 1000 mg IV BID and Tums 500 mg PO BID (used to be on calcium supplements)   -check Calcium and ionized daily     Hypophosphatemia  -Phosphorous 2.0, replace per standing orders  -check level daily     Hypokalemia   -K+ 2.6, replaced and now 3.5   -monitor BMP daily    Hypomagnesemia  -Mag 0.5, replaced with IV  -check Mag levels daily     Elevated troponin and BNP : may be from demand ischemia  Tachycardia, Irregular HR  --160, BNP 3819, Trop 62  -EKG sinus tachycardia 121/min PACs nonspecific ST-T wave changes, no STEMI or equivalent  -Chest pain-free, repeat troponin is 53  -EKG: Sinus tachycardia with PAC's   -Cardiology Consult: no further workup at this time  -is on Lopressor 50 mg PO BID   -ECHO: pending

## 2025-05-02 NOTE — CARE COORDINATION
PT/OT saw pt and their recs are for SNF.  LSW spoke with pt regarding SNF after discharge.  Pt stated she is not sure if she wants to go to a SNF.   Pt wants to talk with her . SNF list givne.  Pt has Medicare no need for a precert.

## 2025-05-02 NOTE — PROGRESS NOTES
decreased awareness of deficits  Affect: Normal       Functional Mobility:  Bed mobility:  supine to sitting EOB with mod A, Vcs for initiation and sequencing. Sit to supine with mod Ax2, repositioned in bed with max Ax2.   Sitting balance:  SBA    Transfers: STS to/from EOB with min A, Vcs for hand placement and safety  Standing balance:  CGA - min A with Vcs for posture and safety. Maintains kyphotic posture with B UE support on RW, unable to achieve full upright standing d/t weakness/fatigue  Functional Mobility: NT d/t activity tolerance  Toilet/Shower Transfers: NT        Activities of Daily Living (ADLs):  Feeding: set up A  Grooming: brushed hair with min A for thoroughness in back while seated EOB   UB bathing: mod A  LB bathing: max A  UB dressing: mod A  LB dressing: max A to don/doff depends in sitting/standing, max A to don socks while seated EOB  Toileting: max A hygiene and clothing mgmt in standing    *ADL determined per observation of functional mobility, balance, activity tolerance, cognition, or actual ADL performance.     AM-PAC 6 click short form for inpatient daily activity:   How much help from another person does the patient currently need... Unable  Dep A Lot  Max A A Lot   Mod A A Little  Min A A Little   CGA  SBA None   Mod I  Indep  Sup   1.  Putting on and taking off regular lower body clothing? [] 1    [x] 2   [] 2   [] 3   [] 3   [] 4      2. Bathing (including washing, rinsing, drying)? [] 1   [x] 2   [] 2 [] 3 [] 3 [] 4   3. Toileting, which includes using toilet, bedpan, or urinal? [] 1    [x] 2   [] 2   [] 3   [] 3   [] 4     4. Putting on and taking off regular upper body clothing? [] 1   [] 2   [x] 2   [] 3   [] 3    [] 4      5. Taking care of personal grooming such as brushing teeth? [] 1   [] 2    [] 2 [x] 3    [] 3   [] 4      6. Eating meals?   [] 1   [] 2   [] 2   [] 3   [] 3   [x] 4        Raw Score:  15     [24=0% impaired(CH), 23=1-19%(CI), 20-22=20-39%(CJ),  15-19=40-59%(CK), 10-14=60-79%(CL), 7-9=80-99%(CM), 6=100%(CN)]     Treatment:    Self Care Training:   Cues were given for safety, sequence, UE/LE placement, visual cues, and balance.    Activities performed today included LB dressing tasks, toileting       Educated pt on role of OT, therapy POC and functional goals, progression w/ ADLs and transfers, importance of movement and OOB activity, d/c recommendations     Safety Measures: Gait belt used, Left in bed per pt request, Alarm in place  Recommendations for NURSING activity:  Up to chair for all 3 meals and up to BSC for all toileting needs       Assessment:  Pt is an 84 y o F admitted d/t sepsis secondary to UTI. Pt at baseline is IND for ADLs, has assistance for high level IADLs, and mod I for functional transfers/mobility w/ 4ww. Pt currently presents w/ deficits in ADL and high level IADL independence, functional ADL transfers, strength, and functional activity tolerance. Continued OT services recommended to increase safety and independence with ADL routine and to address remaining functional deficits. Pt would benefit from continued acute care OT services w/ discharge to Facility for moderate post-acute rehabilitation, anticipate 1-2 hours per day and 5 days per week.      Complexity: Moderate  Prognosis: Good, no significant barriers to participation at this time.   Occupational Therapy Plan  Times Per Week: 3+         Goals:  Pt will complete all aspects of bed mobility for EOB/OOB ADLs w/ min A.  Pt will complete UB ADLs w/ min A.  Pt will complete LB ADLs w/ mod A.  Pt will complete all functional transfers to and from bed, chair, toilet, shower chair w/ CGA.  Pt will ambulate functional household distance w/ min A.  Pt will complete all aspects of toileting task w/ mod A.  Pt will perform therex/theract in order to increase strength and functional activity tolerance necessary for increased independence w/ ADL routine.    Pt goal: go home, get

## 2025-05-02 NOTE — CARE COORDINATION
LSW read in RN flowsheet pt went from minimal assist to max assist.  LSW requested PT/OT orders from NP.  WB note placed.

## 2025-05-02 NOTE — CONSULTS
CARDIOLOGY CONSULT NOTE   Reason for consultation:      Referring physician:  No admitting provider for patient encounter.     Primary care physician: Bob Abraham MD      Dear   Thanks for the consult.    History of present illness:Edna is a 84 y.o.year old who  presents with nausea, she is very poor historian all information obtained after review medical record discussion staff, she is admitted for UTI sepsis acute kidney insufficiency reviewed had recent GI bleeding is anemic and has bilateral lower extremity edema also, cardiac also was called for tachycardia.  Patient has any chest pain or shortness of breath  Chief Complaint   Patient presents with    Nausea     Blood pressure, cholesterol, blood glucose and weight are well controlled.    Past medical history:    has a past medical history of Hypertension, Multiple myeloma (HCC), Swelling, and Thyroid disease.  Past surgical history:   has a past surgical history that includes Hysterectomy; Colon surgery; Appendectomy; Cholecystectomy; invasive vascular (N/A, 7/18/2024); and Upper gastrointestinal endoscopy (N/A, 3/20/2025).  Social History:   reports that she has never smoked. She has never used smokeless tobacco. She reports that she does not currently use alcohol. She reports that she does not use drugs.  Family history:   no family history of CAD, STROKE of DM    Allergies   Allergen Reactions    Ampicillin Itching and Rash    Meperidine Other (See Comments)     Low blood pressure    Other reaction(s): Other - comment required   Low blood pressure    Meperidine Hcl Other (See Comments)     Other Reaction(s): Unknown    Other Other (See Comments)     112.9KG///[id number]///DB<br/>Reaction(s): Unknown; Note: 112.9KG///40RAA25///DB    Methotrexate Rash    Sulfa Antibiotics Hives, Other (See Comments) and Rash     HIVES,[id number]       cefTRIAXone (ROCEPHIN) 2,000 mg in sodium chloride 0.9 % 50 mL IVPB (addEASE), Q24H  metoprolol tartrate (LOPRESSOR) tablet 50 mg,  BID  enoxaparin Sodium (LOVENOX) injection 30 mg, Daily  acetaminophen (TYLENOL) tablet 1,000 mg, Q8H PRN  sodium phosphate 15 mmol in sodium chloride 0.9 % 250 mL IVPB, PRN  magnesium sulfate 2000 mg in 50 mL IVPB premix, PRN  potassium chloride 20 mEq/50 mL IVPB (Central Line), PRN   Or  potassium chloride 10 mEq/100 mL IVPB (Peripheral Line), PRN  calcium carbonate (TUMS) chewable tablet 500 mg, BID  ondansetron (ZOFRAN) injection 4 mg, Q6H PRN  lactated ringers infusion, Continuous  levothyroxine (SYNTHROID) tablet 150 mcg, QAM AC  pantoprazole (PROTONIX) tablet 40 mg, BID  sucralfate (CARAFATE) tablet 1 g, 4x Daily AC & HS      Current Facility-Administered Medications   Medication Dose Route Frequency Provider Last Rate Last Admin    cefTRIAXone (ROCEPHIN) 2,000 mg in sodium chloride 0.9 % 50 mL IVPB (addEASE)  2,000 mg IntraVENous Q24H Margarito Dsouza MD   Stopped at 05/02/25 0830    metoprolol tartrate (LOPRESSOR) tablet 50 mg  50 mg Oral BID Margarito Dsouza MD   50 mg at 05/02/25 0758    enoxaparin Sodium (LOVENOX) injection 30 mg  30 mg SubCUTAneous Daily Margarito Dsouza MD   30 mg at 05/02/25 0758    acetaminophen (TYLENOL) tablet 1,000 mg  1,000 mg Oral Q8H PRN Narcluis fernando Sarah Beth, APRN - CNP   1,000 mg at 05/01/25 1108    sodium phosphate 15 mmol in sodium chloride 0.9 % 250 mL IVPB  15 mmol IntraVENous PRN Narcelles, Sarah Beth, APRN - CNP   Stopped at 05/01/25 2121    magnesium sulfate 2000 mg in 50 mL IVPB premix  2,000 mg IntraVENous PRN Narcelles, Sarah Beth, APRN - CNP   Stopped at 05/01/25 1608    potassium chloride 20 mEq/50 mL IVPB (Central Line)  20 mEq IntraVENous PRN Narcluis fernando, Sarah Beth, APRN - CNP 50 mL/hr at 05/02/25 1013 20 mEq at 05/02/25 1013    Or    potassium chloride 10 mEq/100 mL IVPB (Peripheral Line)  10 mEq IntraVENous PRN Sarah Beth Young APRN - CNP        calcium carbonate (TUMS) chewable tablet 500 mg  500 mg Oral BID Sarah Beth Yonug APRN - CNP   500 mg at 05/02/25 0754

## 2025-05-02 NOTE — PROGRESS NOTES
I did NOT see the patient. The patient was discussed with the SHIVANI. I was available for questions and consultation as needed.       BCx negative. Treating hypocalcemia. Treating UTI.

## 2025-05-02 NOTE — CONSULTS
[facility] ACUTE CARE PHYSICAL THERAPY EVALUATION  Edna Dowell, 1940, 3002/3002-A, 5/2/2025    History  [place]:  The primary encounter diagnosis was Severe sepsis (HCC). Diagnoses of Nausea and vomiting, unspecified vomiting type, APARNA (acute kidney injury), Hypokalemia, Hypocalcemia, Elevated troponin, Acute respiratory failure with hypoxia (HCC), and Acute cystitis with hematuria were also pertinent to this visit.  Patient  has a past medical history of Hypertension, Multiple myeloma (HCC), Swelling, and Thyroid disease.  Patient  has a past surgical history that includes Hysterectomy; Colon surgery; Appendectomy; Cholecystectomy; invasive vascular (N/A, 7/18/2024); and Upper gastrointestinal endoscopy (N/A, 3/20/2025).    Discharge Recommendation: Facility for moderate post-acute rehabilitation, anticipate 1-2 hours per day and 5 days per week (swing)    Equipment: TBD at next level of care    Subjective:    Patient states:  \"They say my heart rate is bad, you'd think I'd pass out or something\"      Pain:  states generalized pain, does not numerically rate.      Communication with other providers:  Handoff to RN, OT    Restrictions: general precautions, fall risk    Home Setup/Prior level of function  Lives With: Spouse  Type of Home: Condo  Home Layout: One level  Home Access: Level entry  Bathroom Toilet: Standard  Bathroom Equipment: Shower chair, Grab bars in shower  Home Equipment: Walker - Rolling, Rollator  Prior Level of Assist for ADLs: Independent  Prior Level of Assist for Ambulation: Independent household ambulator, with or without device (mod I with 4ww)  Prior Level of Assist for Transfers: Independent  Active : No  Patient's  Info:      Examination of body systems (includes body structures/functions, activity/participation limitations):  Observation:  Pt supine in bed upon arrival and agreeable to therapy  Vision:  WFL  Hearing:  WFL  Cardiopulmonary:  2L O2; HR  transfers, balance, gait, TA, TX    Recommendations for NURSING mobility: STS with RW min A    Time:   Time in: 1001  Time out: 1027  Timed treatment minutes: 15  Total time: 26    Electronically signed by:    Domenica Sierra, PT  5/2/2025, 12:38 PM

## 2025-05-03 ENCOUNTER — APPOINTMENT (OUTPATIENT)
Dept: ULTRASOUND IMAGING | Age: 85
DRG: 872 | End: 2025-05-03
Payer: MEDICARE

## 2025-05-03 LAB
ANION GAP SERPL CALCULATED.3IONS-SCNC: 9 MMOL/L (ref 9–17)
BASOPHILS # BLD: 0.02 K/UL
BASOPHILS NFR BLD: 1 % (ref 0–1)
BUN SERPL-MCNC: 20 MG/DL (ref 7–20)
CA-I BLD-SCNC: 0.95 MMOL/L (ref 1.15–1.33)
CALCIUM SERPL-MCNC: 8.1 MG/DL (ref 8.3–10.6)
CHLORIDE SERPL-SCNC: 106 MMOL/L (ref 99–110)
CO2 SERPL-SCNC: 27 MMOL/L (ref 21–32)
CREAT SERPL-MCNC: 1.1 MG/DL (ref 0.6–1.2)
EKG ATRIAL RATE: 110 BPM
EKG DIAGNOSIS: NORMAL
EKG P AXIS: 48 DEGREES
EKG P-R INTERVAL: 160 MS
EKG Q-T INTERVAL: 304 MS
EKG QRS DURATION: 70 MS
EKG QTC CALCULATION (BAZETT): 411 MS
EKG R AXIS: -15 DEGREES
EKG T AXIS: 146 DEGREES
EKG VENTRICULAR RATE: 110 BPM
EOSINOPHIL # BLD: 0.03 K/UL
EOSINOPHILS RELATIVE PERCENT: 1 % (ref 0–3)
ERYTHROCYTE [DISTWIDTH] IN BLOOD BY AUTOMATED COUNT: 18.2 % (ref 11.7–14.9)
GFR, ESTIMATED: 48 ML/MIN/1.73M2
GLUCOSE SERPL-MCNC: 99 MG/DL (ref 74–99)
HCT VFR BLD AUTO: 30.8 % (ref 37–47)
HGB BLD-MCNC: 9.4 G/DL (ref 12.5–16)
IMM GRANULOCYTES # BLD AUTO: 0.02 K/UL
IMM GRANULOCYTES NFR BLD: 1 %
LYMPHOCYTES NFR BLD: 1.49 K/UL
LYMPHOCYTES RELATIVE PERCENT: 35 % (ref 24–44)
MAGNESIUM SERPL-MCNC: 1.9 MG/DL (ref 1.8–2.4)
MCH RBC QN AUTO: 30.2 PG (ref 27–31)
MCHC RBC AUTO-ENTMCNC: 30.5 G/DL (ref 32–36)
MCV RBC AUTO: 99 FL (ref 78–100)
MICROORGANISM SPEC CULT: ABNORMAL
MICROORGANISM SPEC CULT: ABNORMAL
MONOCYTES NFR BLD: 0.33 K/UL
MONOCYTES NFR BLD: 8 % (ref 0–5)
NEUTROPHILS NFR BLD: 55 % (ref 36–66)
NEUTS SEG NFR BLD: 2.32 K/UL
PHOSPHATE SERPL-MCNC: 2.3 MG/DL (ref 2.5–4.9)
PLATELET # BLD AUTO: 115 K/UL (ref 140–440)
PMV BLD AUTO: 12.2 FL (ref 7.5–11.1)
POTASSIUM SERPL-SCNC: 3.8 MMOL/L (ref 3.5–5.1)
RBC # BLD AUTO: 3.11 M/UL (ref 4.2–5.4)
SERVICE CMNT-IMP: ABNORMAL
SODIUM SERPL-SCNC: 141 MMOL/L (ref 136–145)
SPECIMEN DESCRIPTION: ABNORMAL
WBC OTHER # BLD: 4.2 K/UL (ref 4–10.5)

## 2025-05-03 PROCEDURE — 36593 DECLOT VASCULAR DEVICE: CPT

## 2025-05-03 PROCEDURE — 82330 ASSAY OF CALCIUM: CPT

## 2025-05-03 PROCEDURE — 6370000000 HC RX 637 (ALT 250 FOR IP): Performed by: STUDENT IN AN ORGANIZED HEALTH CARE EDUCATION/TRAINING PROGRAM

## 2025-05-03 PROCEDURE — 2580000003 HC RX 258: Performed by: STUDENT IN AN ORGANIZED HEALTH CARE EDUCATION/TRAINING PROGRAM

## 2025-05-03 PROCEDURE — 6360000002 HC RX W HCPCS: Performed by: NURSE PRACTITIONER

## 2025-05-03 PROCEDURE — 36415 COLL VENOUS BLD VENIPUNCTURE: CPT

## 2025-05-03 PROCEDURE — C1751 CATH, INF, PER/CENT/MIDLINE: HCPCS

## 2025-05-03 PROCEDURE — 6370000000 HC RX 637 (ALT 250 FOR IP): Performed by: NURSE PRACTITIONER

## 2025-05-03 PROCEDURE — 93971 EXTREMITY STUDY: CPT

## 2025-05-03 PROCEDURE — 93010 ELECTROCARDIOGRAM REPORT: CPT | Performed by: INTERNAL MEDICINE

## 2025-05-03 PROCEDURE — 2580000003 HC RX 258: Performed by: NURSE PRACTITIONER

## 2025-05-03 PROCEDURE — 83735 ASSAY OF MAGNESIUM: CPT

## 2025-05-03 PROCEDURE — 80048 BASIC METABOLIC PNL TOTAL CA: CPT

## 2025-05-03 PROCEDURE — 85025 COMPLETE CBC W/AUTO DIFF WBC: CPT

## 2025-05-03 PROCEDURE — 93005 ELECTROCARDIOGRAM TRACING: CPT | Performed by: HOSPITALIST

## 2025-05-03 PROCEDURE — 94761 N-INVAS EAR/PLS OXIMETRY MLT: CPT

## 2025-05-03 PROCEDURE — 76937 US GUIDE VASCULAR ACCESS: CPT

## 2025-05-03 PROCEDURE — 2700000000 HC OXYGEN THERAPY PER DAY

## 2025-05-03 PROCEDURE — 6360000002 HC RX W HCPCS: Performed by: STUDENT IN AN ORGANIZED HEALTH CARE EDUCATION/TRAINING PROGRAM

## 2025-05-03 PROCEDURE — 84100 ASSAY OF PHOSPHORUS: CPT

## 2025-05-03 PROCEDURE — 05HC33Z INSERTION OF INFUSION DEVICE INTO LEFT BASILIC VEIN, PERCUTANEOUS APPROACH: ICD-10-PCS | Performed by: STUDENT IN AN ORGANIZED HEALTH CARE EDUCATION/TRAINING PROGRAM

## 2025-05-03 PROCEDURE — 1200000000 HC SEMI PRIVATE

## 2025-05-03 PROCEDURE — 36410 VNPNXR 3YR/> PHY/QHP DX/THER: CPT

## 2025-05-03 RX ORDER — 0.9 % SODIUM CHLORIDE 0.9 %
500 INTRAVENOUS SOLUTION INTRAVENOUS ONCE
Status: COMPLETED | OUTPATIENT
Start: 2025-05-03 | End: 2025-05-03

## 2025-05-03 RX ADMIN — ALTEPLASE 2 MG: 2.2 INJECTION, POWDER, LYOPHILIZED, FOR SOLUTION INTRAVENOUS at 18:15

## 2025-05-03 RX ADMIN — CEFTRIAXONE SODIUM 2000 MG: 2 INJECTION, POWDER, FOR SOLUTION INTRAMUSCULAR; INTRAVENOUS at 09:13

## 2025-05-03 RX ADMIN — METOPROLOL TARTRATE 50 MG: 50 TABLET, FILM COATED ORAL at 21:28

## 2025-05-03 RX ADMIN — METOPROLOL TARTRATE 50 MG: 50 TABLET, FILM COATED ORAL at 13:21

## 2025-05-03 RX ADMIN — PANTOPRAZOLE SODIUM 40 MG: 40 TABLET, DELAYED RELEASE ORAL at 09:06

## 2025-05-03 RX ADMIN — CALCIUM CARBONATE 500 MG: 500 TABLET, CHEWABLE ORAL at 21:28

## 2025-05-03 RX ADMIN — LEVOTHYROXINE SODIUM 150 MCG: 0.15 TABLET ORAL at 06:16

## 2025-05-03 RX ADMIN — ACETAMINOPHEN 1000 MG: 500 TABLET ORAL at 13:23

## 2025-05-03 RX ADMIN — SUCRALFATE 1 G: 1 TABLET ORAL at 21:28

## 2025-05-03 RX ADMIN — CALCIUM CARBONATE 500 MG: 500 TABLET, CHEWABLE ORAL at 09:06

## 2025-05-03 RX ADMIN — SUCRALFATE 1 G: 1 TABLET ORAL at 06:16

## 2025-05-03 RX ADMIN — ENOXAPARIN SODIUM 30 MG: 100 INJECTION SUBCUTANEOUS at 09:06

## 2025-05-03 RX ADMIN — SUCRALFATE 1 G: 1 TABLET ORAL at 12:56

## 2025-05-03 RX ADMIN — PANTOPRAZOLE SODIUM 40 MG: 40 TABLET, DELAYED RELEASE ORAL at 21:28

## 2025-05-03 RX ADMIN — SODIUM CHLORIDE 500 ML: 0.9 INJECTION, SOLUTION INTRAVENOUS at 16:10

## 2025-05-03 ASSESSMENT — PAIN SCALES - GENERAL: PAINLEVEL_OUTOF10: 10

## 2025-05-03 ASSESSMENT — PAIN DESCRIPTION - ORIENTATION: ORIENTATION: RIGHT

## 2025-05-03 ASSESSMENT — PAIN DESCRIPTION - DESCRIPTORS: DESCRIPTORS: BURNING

## 2025-05-03 ASSESSMENT — PAIN DESCRIPTION - LOCATION: LOCATION: LEG

## 2025-05-03 ASSESSMENT — PAIN - FUNCTIONAL ASSESSMENT: PAIN_FUNCTIONAL_ASSESSMENT: PREVENTS OR INTERFERES WITH MANY ACTIVE NOT PASSIVE ACTIVITIES

## 2025-05-03 NOTE — PROGRESS NOTES
V2.0  [facility] Hospitalist Progress Note      Name:  Edna Dowell /Age/Sex: 1940  (84 y.o. female)   MRN & CSN:  [id number] & [id number] Encounter Date/Time: 5/3/2025 8:32 AM EDT    [facility]  [facility] PCP: Bob Abraham MD       Hospital Day: 4    Assessment and Plan:   Edna Dowell is a 84 y.o. female with pmh of  multiple myeloma in remission, chronic thrombocytopenia, amyloidosis, recent GI bleed, RA, hypothyroidism, CKD stage IIIa,  who presents with Sepsis secondary to UTI (HCC)      Plan:    Sepsis POA  Intractable nausea vomiting  Klebsiella UTI   -Recent history of acute gastroenteritis  -IV Rocephin follow-up urine and blood cultures  -Lactic Acid 2.1>1.6 following IV fluids, will cont LR 75 ml/hr   -CT Chest Abd Pelvis: no acute process  -Urine CX: + klebsiella pneumoniae, ampicillin resistant  -Blood Cx x 2: NGTD  -Dispo: Likely SNF. States her first choice is [facility]      APARNA on CKD stage IIIa, Cr baseline 1.0   Likely 2/2 N&V  -Likely prerenal given elevated urine SG and ketones  -Received almost 2.5 L IV fluids, hold and monitor RFTs  -sCr pending repeat lab however difficulty with venous access  -IV team consult for lab draw/IV access and evaluation of port.  Unable to draw blood     Hypocalcemia   -Calcium 6.2>6.4, ionized Ca++ 0.73>0.83  -starting calcium chloride 1000 mg IV BID and Tums 500 mg PO BID (used to be on calcium supplements)   -check Calcium and ionized daily   - diffuculty obtaining labs today d/t access     Hypophosphatemia  -Phosphorous 2.0,  -check level daily     Hypokalemia   -K+ 2.6, replaced and now 3.5   -monitor BMP daily    Hypomagnesemia  -Mag 0.5, replaced with IV  -check Mag levels daily     Elevated troponin and BNP : may be from demand ischemia  Tachycardia, Irregular HR  --160, BNP 3819, Trop 62  -Repeat EKG- ST with PACs  -Chest pain-free,   -EKG: Sinus tachycardia with PAC's   -Cardiology Consult: no further workup at this time  -is on Lopressor 50 mg  PRN  potassium chloride, 20 mEq, PRN   Or  potassium chloride, 10 mEq, PRN  ondansetron, 4 mg, Q6H PRN        Labs      Recent Results (from the past 24 hours)   EKG 12 Lead    Collection Time: 05/03/25 11:29 AM   Result Value Ref Range    Ventricular Rate 114 BPM    Atrial Rate 114 BPM    P-R Interval 140 ms    QRS Duration 72 ms    Q-T Interval 282 ms    QTc Calculation (Bazett) 388 ms    P Axis 27 degrees    R Axis -10 degrees    T Axis 154 degrees    Diagnosis       Sinus tachycardia with premature atrial complexes  Low voltage QRS  Inferior infarct (cited on or before 27-FEB-2025)  Cannot rule out Anterior infarct (cited on or before 27-FEB-2025)  Abnormal ECG  When compared with ECG of 01-MAY-2025 11:15,  No significant change was found          Imaging/Diagnostics Last 24 Hours   CT CHEST ABDOMEN PELVIS WO CONTRAST Additional Contrast? None  Result Date: 4/30/2025  EXAM:  CT CHEST ABDOMEN PELVIS WO CONTRAST DATE OF EXAM:  4/30/2025 14:20 DEMOGRAPHICS: 84 years old Female CLINICAL STATEMENT: Sepsis, hypoxia, nausea and vomiting COMPARISON: 3/19/2025 CTA abdomen and pelvis. DOSE OPTIMIZATION: CT radiation dose optimization techniques (automated exposure  control, and use of iterative reconstruction techniques, or adjustment of the mA and/or kV according to patient size) were used to limit patient radiation dose. CHEST FINDINGS: HEART/PERICARDIUM: Normal size. No pericardial thickening or effusion. VASCULATURE: The aorta is normal in caliber. Atherosclerotic calcifications at the aorta. A right internal jugular port ends at the SVC. ESOPHAGUS: Normal in course and caliber. LYMPH NODES: Limited evaluation for adenopathy due to noncontrast technique. No adenopathy is seen. LUNGS: Subsegmental atelectasis at the dependent portions of the bilateral lower  lobes. No pleural fluid, pneumothorax, or consolidation. CHEST WALL/BONES: Degenerative changes within the spine. A remote compression fracture at T12 with changes

## 2025-05-03 NOTE — CARE COORDINATION
CM in to see Pt to follow up on discharge planning.  Pt  Kenneth present. Pt is agreeable with therapy recommendations.      Pt choice of [facility].  CM call to [facility] with referral.

## 2025-05-03 NOTE — CONSULTS
Power Port re-accessed with correct needle set for power injection.  Multiple rounds of pulsatile flushing, still no blood return.  Will Cathflo once bolus complete and any other emergent testing.      Attempted ultrasound guided lab draws x2, while I was able to cannulate vessels in the AC, only able to obtain scant blood samples before flow clots off.

## 2025-05-03 NOTE — CONSULTS
Instilled 2mg activase per lumen (Implanted Port) per protocol.  Pt education regarding medication reviewed.  Lumens locked and labeled \"do not use\", RN notified. Lis Oliver RN

## 2025-05-03 NOTE — PROGRESS NOTES
I did NOT see the patient. The patient was discussed with the SIHVANI. I was available for questions and consultation as needed.       Attempting to get blood work to address calcium.

## 2025-05-03 NOTE — PLAN OF CARE
Problem: Discharge Planning  Goal: Discharge to home or other facility with appropriate resources  Outcome: Progressing     Problem: Pain  Goal: Verbalizes/displays adequate comfort level or baseline comfort level  Outcome: Progressing  Flowsheets (Taken 5/2/2025 0754 by Lesli Cooper, RN)  Verbalizes/displays adequate comfort level or baseline comfort level: Assess pain using appropriate pain scale     Problem: Safety - Adult  Goal: Free from fall injury  Outcome: Progressing     Problem: Skin/Tissue Integrity  Goal: Skin integrity remains intact  Description: 1.  Monitor for areas of redness and/or skin breakdown2.  Assess vascular access sites hourly3.  Every 4-6 hours minimum:  Change oxygen saturation probe site4.  Every 4-6 hours:  If on nasal continuous positive airway pressure, respiratory therapy assess nares and determine need for appliance change or resting period  Outcome: Progressing     Problem: ABCDS Injury Assessment  Goal: Absence of physical injury  Outcome: Progressing     Problem: Nutrition Deficit:  Goal: Optimize nutritional status  Outcome: Progressing

## 2025-05-04 LAB
ALBUMIN SERPL-MCNC: 2.1 G/DL (ref 3.4–5)
ALBUMIN/GLOB SERPL: 1.1 {RATIO} (ref 1.1–2.2)
ALP SERPL-CCNC: 116 U/L (ref 40–129)
ALT SERPL-CCNC: 10 U/L (ref 10–40)
ANION GAP SERPL CALCULATED.3IONS-SCNC: 8 MMOL/L (ref 9–17)
ANION GAP SERPL CALCULATED.3IONS-SCNC: 9 MMOL/L (ref 9–17)
AST SERPL-CCNC: 17 U/L (ref 15–37)
BASOPHILS # BLD: 0.02 K/UL
BASOPHILS NFR BLD: 1 % (ref 0–1)
BILIRUB SERPL-MCNC: <0.2 MG/DL (ref 0–1)
BUN SERPL-MCNC: 19 MG/DL (ref 7–20)
BUN SERPL-MCNC: 19 MG/DL (ref 7–20)
CA-I BLD-SCNC: 1.1 MMOL/L (ref 1.15–1.33)
CA-I BLD-SCNC: 1.12 MMOL/L (ref 1.15–1.33)
CALCIUM SERPL-MCNC: 8 MG/DL (ref 8.3–10.6)
CALCIUM SERPL-MCNC: 8.4 MG/DL (ref 8.3–10.6)
CHLORIDE SERPL-SCNC: 108 MMOL/L (ref 99–110)
CHLORIDE SERPL-SCNC: 109 MMOL/L (ref 99–110)
CO2 SERPL-SCNC: 25 MMOL/L (ref 21–32)
CO2 SERPL-SCNC: 26 MMOL/L (ref 21–32)
CREAT SERPL-MCNC: 0.9 MG/DL (ref 0.6–1.2)
CREAT SERPL-MCNC: 1 MG/DL (ref 0.6–1.2)
EKG ATRIAL RATE: 114 BPM
EKG DIAGNOSIS: NORMAL
EKG P AXIS: 27 DEGREES
EKG P-R INTERVAL: 140 MS
EKG Q-T INTERVAL: 282 MS
EKG QRS DURATION: 72 MS
EKG QTC CALCULATION (BAZETT): 388 MS
EKG R AXIS: -10 DEGREES
EKG T AXIS: 154 DEGREES
EKG VENTRICULAR RATE: 114 BPM
EOSINOPHIL # BLD: 0.05 K/UL
EOSINOPHILS RELATIVE PERCENT: 1 % (ref 0–3)
ERYTHROCYTE [DISTWIDTH] IN BLOOD BY AUTOMATED COUNT: 17.9 % (ref 11.7–14.9)
ERYTHROCYTE [DISTWIDTH] IN BLOOD BY AUTOMATED COUNT: 18.1 % (ref 11.7–14.9)
GFR, ESTIMATED: 56 ML/MIN/1.73M2
GFR, ESTIMATED: 57 ML/MIN/1.73M2
GLUCOSE SERPL-MCNC: 105 MG/DL (ref 74–99)
GLUCOSE SERPL-MCNC: 81 MG/DL (ref 74–99)
HCT VFR BLD AUTO: 28 % (ref 37–47)
HCT VFR BLD AUTO: 30.1 % (ref 37–47)
HGB BLD-MCNC: 8.6 G/DL (ref 12.5–16)
HGB BLD-MCNC: 9.2 G/DL (ref 12.5–16)
IMM GRANULOCYTES # BLD AUTO: 0.01 K/UL
IMM GRANULOCYTES NFR BLD: 0 %
LYMPHOCYTES NFR BLD: 1.44 K/UL
LYMPHOCYTES RELATIVE PERCENT: 41 % (ref 24–44)
MAGNESIUM SERPL-MCNC: 1.6 MG/DL (ref 1.8–2.4)
MAGNESIUM SERPL-MCNC: 1.7 MG/DL (ref 1.8–2.4)
MCH RBC QN AUTO: 30.1 PG (ref 27–31)
MCH RBC QN AUTO: 30.5 PG (ref 27–31)
MCHC RBC AUTO-ENTMCNC: 30.6 G/DL (ref 32–36)
MCHC RBC AUTO-ENTMCNC: 30.7 G/DL (ref 32–36)
MCV RBC AUTO: 98.4 FL (ref 78–100)
MCV RBC AUTO: 99.3 FL (ref 78–100)
MONOCYTES NFR BLD: 0.29 K/UL
MONOCYTES NFR BLD: 8 % (ref 0–5)
NEUTROPHILS NFR BLD: 48 % (ref 36–66)
NEUTS SEG NFR BLD: 1.7 K/UL
PHOSPHATE SERPL-MCNC: 2.3 MG/DL (ref 2.5–4.9)
PLATELET # BLD AUTO: 110 K/UL (ref 140–440)
PLATELET # BLD AUTO: 129 K/UL (ref 140–440)
PMV BLD AUTO: 11.9 FL (ref 7.5–11.1)
PMV BLD AUTO: 12.2 FL (ref 7.5–11.1)
POTASSIUM SERPL-SCNC: 3.6 MMOL/L (ref 3.5–5.1)
POTASSIUM SERPL-SCNC: 3.8 MMOL/L (ref 3.5–5.1)
PROT SERPL-MCNC: 3.9 G/DL (ref 6.4–8.2)
RBC # BLD AUTO: 2.82 M/UL (ref 4.2–5.4)
RBC # BLD AUTO: 3.06 M/UL (ref 4.2–5.4)
SODIUM SERPL-SCNC: 142 MMOL/L (ref 136–145)
SODIUM SERPL-SCNC: 142 MMOL/L (ref 136–145)
WBC OTHER # BLD: 3.5 K/UL (ref 4–10.5)
WBC OTHER # BLD: 4.6 K/UL (ref 4–10.5)

## 2025-05-04 PROCEDURE — 80048 BASIC METABOLIC PNL TOTAL CA: CPT

## 2025-05-04 PROCEDURE — 85027 COMPLETE CBC AUTOMATED: CPT

## 2025-05-04 PROCEDURE — 36415 COLL VENOUS BLD VENIPUNCTURE: CPT

## 2025-05-04 PROCEDURE — 6360000002 HC RX W HCPCS: Performed by: NURSE PRACTITIONER

## 2025-05-04 PROCEDURE — 6370000000 HC RX 637 (ALT 250 FOR IP): Performed by: NURSE PRACTITIONER

## 2025-05-04 PROCEDURE — 83735 ASSAY OF MAGNESIUM: CPT

## 2025-05-04 PROCEDURE — 94761 N-INVAS EAR/PLS OXIMETRY MLT: CPT

## 2025-05-04 PROCEDURE — 80053 COMPREHEN METABOLIC PANEL: CPT

## 2025-05-04 PROCEDURE — 93010 ELECTROCARDIOGRAM REPORT: CPT | Performed by: INTERNAL MEDICINE

## 2025-05-04 PROCEDURE — 2580000003 HC RX 258: Performed by: NURSE PRACTITIONER

## 2025-05-04 PROCEDURE — 1200000000 HC SEMI PRIVATE

## 2025-05-04 PROCEDURE — 82330 ASSAY OF CALCIUM: CPT

## 2025-05-04 PROCEDURE — 2580000003 HC RX 258: Performed by: STUDENT IN AN ORGANIZED HEALTH CARE EDUCATION/TRAINING PROGRAM

## 2025-05-04 PROCEDURE — 6360000002 HC RX W HCPCS: Performed by: STUDENT IN AN ORGANIZED HEALTH CARE EDUCATION/TRAINING PROGRAM

## 2025-05-04 PROCEDURE — 84100 ASSAY OF PHOSPHORUS: CPT

## 2025-05-04 PROCEDURE — 85025 COMPLETE CBC W/AUTO DIFF WBC: CPT

## 2025-05-04 PROCEDURE — 6370000000 HC RX 637 (ALT 250 FOR IP): Performed by: STUDENT IN AN ORGANIZED HEALTH CARE EDUCATION/TRAINING PROGRAM

## 2025-05-04 RX ORDER — 0.9 % SODIUM CHLORIDE 0.9 %
500 INTRAVENOUS SOLUTION INTRAVENOUS ONCE
Status: COMPLETED | OUTPATIENT
Start: 2025-05-04 | End: 2025-05-05

## 2025-05-04 RX ADMIN — CALCIUM GLUCONATE 1500 MG: 98 INJECTION, SOLUTION INTRAVENOUS at 17:02

## 2025-05-04 RX ADMIN — ACETAMINOPHEN 1000 MG: 500 TABLET ORAL at 14:59

## 2025-05-04 RX ADMIN — SUCRALFATE 1 G: 1 TABLET ORAL at 17:00

## 2025-05-04 RX ADMIN — CALCIUM CARBONATE 500 MG: 500 TABLET, CHEWABLE ORAL at 08:38

## 2025-05-04 RX ADMIN — SUCRALFATE 1 G: 1 TABLET ORAL at 05:45

## 2025-05-04 RX ADMIN — SUCRALFATE 1 G: 1 TABLET ORAL at 21:50

## 2025-05-04 RX ADMIN — SODIUM CHLORIDE 500 ML: 0.9 INJECTION, SOLUTION INTRAVENOUS at 14:55

## 2025-05-04 RX ADMIN — CEFTRIAXONE SODIUM 2000 MG: 2 INJECTION, POWDER, FOR SOLUTION INTRAMUSCULAR; INTRAVENOUS at 08:40

## 2025-05-04 RX ADMIN — SUCRALFATE 1 G: 1 TABLET ORAL at 12:57

## 2025-05-04 RX ADMIN — METOPROLOL TARTRATE 50 MG: 50 TABLET, FILM COATED ORAL at 21:56

## 2025-05-04 RX ADMIN — LEVOTHYROXINE SODIUM 150 MCG: 0.15 TABLET ORAL at 05:45

## 2025-05-04 RX ADMIN — ENOXAPARIN SODIUM 30 MG: 100 INJECTION SUBCUTANEOUS at 08:38

## 2025-05-04 RX ADMIN — MAGNESIUM SULFATE HEPTAHYDRATE 2000 MG: 40 INJECTION, SOLUTION INTRAVENOUS at 14:57

## 2025-05-04 RX ADMIN — CALCIUM CARBONATE 500 MG: 500 TABLET, CHEWABLE ORAL at 21:51

## 2025-05-04 RX ADMIN — PANTOPRAZOLE SODIUM 40 MG: 40 TABLET, DELAYED RELEASE ORAL at 21:51

## 2025-05-04 RX ADMIN — PANTOPRAZOLE SODIUM 40 MG: 40 TABLET, DELAYED RELEASE ORAL at 08:39

## 2025-05-04 ASSESSMENT — PAIN DESCRIPTION - LOCATION: LOCATION: HEAD

## 2025-05-04 ASSESSMENT — PAIN - FUNCTIONAL ASSESSMENT: PAIN_FUNCTIONAL_ASSESSMENT: ACTIVITIES ARE NOT PREVENTED

## 2025-05-04 ASSESSMENT — PAIN DESCRIPTION - DESCRIPTORS: DESCRIPTORS: ACHING

## 2025-05-04 ASSESSMENT — PAIN SCALES - GENERAL
PAINLEVEL_OUTOF10: 6
PAINLEVEL_OUTOF10: 0

## 2025-05-04 NOTE — PLAN OF CARE
Problem: Discharge Planning  Goal: Discharge to home or other facility with appropriate resources  Outcome: Progressing     Problem: Pain  Goal: Verbalizes/displays adequate comfort level or baseline comfort level  Outcome: Progressing     Problem: Safety - Adult  Goal: Free from fall injury  Outcome: Progressing     Problem: Skin/Tissue Integrity  Goal: Skin integrity remains intact  Description: 1.  Monitor for areas of redness and/or skin breakdown2.  Assess vascular access sites hourly3.  Every 4-6 hours minimum:  Change oxygen saturation probe site4.  Every 4-6 hours:  If on nasal continuous positive airway pressure, respiratory therapy assess nares and determine need for appliance change or resting period  Outcome: Progressing     Problem: ABCDS Injury Assessment  Goal: Absence of physical injury  Outcome: Progressing     Problem: Nutrition Deficit:  Goal: Optimize nutritional status  Outcome: Progressing

## 2025-05-04 NOTE — CONSULTS
Consult completed.    Indication for line: Limited/no vessel suitable for conventional peripheral access  Medication/Anticipated Duration: Lab draws  Ordering Provider: Dr. Arboleda    LINE STATUS: READY TO USE MIDLINE    Midline insertion education provided to patient, risks and benefits discussed and reviewed with patient. Patient verbalized understanding, questions answered. Pressure injectable BARD PowerGlide Pro™ 20g 10 cm single lumen midline inserted into LUE basilic vein x 1 attempt using sterile ultrasound guided technique without difficulty per protocol. Brisk blood return noted and flushes without resistance. Lab draw. Patient tolerated procedure well. Ultrasound photos of vein diameter provided below. Primary nurse Seng notified and aware.    Consult the Vascular Access Team for questions, concerns, or change in patient's condition.

## 2025-05-04 NOTE — PROGRESS NOTES
I did NOT see the patient. The patient was discussed with the SHIVANI. I was available for questions and consultation as needed.       Improved calcium.

## 2025-05-04 NOTE — PROGRESS NOTES
V2.0  [facility] [place]      Name:  Edna Dowell /Age/Sex: 1940  (85 y.o. female)   MRN & CSN:  [id number] & [id number] Encounter Date/Time: 2025 8:32 AM EDT    Location:  [facility] PCP: Bob Abraham MD       Hospital Day: 5    Assessment and Plan:   Edna Dowell is a 85 y.o. female with pmh of  multiple myeloma in remission, chronic thrombocytopenia, amyloidosis, recent GI bleed, RA, hypothyroidism, CKD stage IIIa,  who presents with Sepsis secondary to UTI (HCC)      Plan:    Sepsis POA  Intractable nausea vomiting  Klebsiella UTI   -Recent history of acute gastroenteritis  -IV Rocephin follow-up   - BLC non acute   -Lactic Acid 2.1>1.6 following IV fluids,   -CT Chest Abd Pelvis: no acute process  -Urine CX: + klebsiella pneumoniae, ampicillin resistant  -Blood Cx x 2: NGTD  -Dispo: Likely SNF. States her first choice is [facility]      APARNA on CKD stage IIIa, Cr baseline 1.0   -Likely prerenal given elevated urine SG and ketones  -sCr 0.9 improved  -IV team consult for lab draw/IV access and evaluation of port  - Midline placed by IV team. Port is now drawing blood      Hypocalcemia   -calcium carbonate 500 mg PO BID (used to be on calcium supplements)   -trend labs   - Ionized 1.10- IV replacement ordered     Hypophosphatemia  -Phosphorous 2.0,  -check level daily     Hypokalemia   -K+ 2.6, replaced and now 3.8   -monitor BMP daily    Hypomagnesemia  -Mag 1.6, replaced with IV  -check Mag levels daily     Elevated troponin and BNP : may be from demand ischemia  Tachycardia, Irregular HR  --160, BNP 3819, Trop 62  -Repeat EKG- ST with PACs  -Chest pain-free,   -EKG: Sinus tachycardia with PAC's   -Cardiology Consult: no further workup at this time  -is on Lopressor 50 mg PO BID   -ECHO: TTE EF 50-50%, normal wall motion, mitral valve calcification   -HR normalized with IVF    Recent GI bleed  -Stable hemoglobin trend  -Continue Protonix and Carafate, Eliquis DC'd     Hx of LLE

## 2025-05-04 NOTE — CONSULTS
Unable to remove cathflo from port lumen but it flushes with ease. Despite several attempts the port does not draw blood. This nurse placed a PowerGlide midline line in the patient's left upper arm to be used for labs. Lab draw. Prior to leaving patient's room this nurse tried one last time to get blood from the port and it worked. Port now flushes and draws blood with ease.

## 2025-05-04 NOTE — CARE COORDINATION
CM contacted by [facility], they can accept Pt today if medically ready.  No precert needed.    PS to Raj Miranda CNP to update.      CM will need GERARDO to be completed by RN and doctor. If pt is discharged after hours please complete the following.... Call report to [facility] [phone]    Place copy of AVS with both GERARDO and any written Rx for pain and anxiety in the packet.  Set up transportation with [place] [phone] and call family.

## 2025-05-05 VITALS
SYSTOLIC BLOOD PRESSURE: 103 MMHG | HEIGHT: 64 IN | DIASTOLIC BLOOD PRESSURE: 60 MMHG | HEART RATE: 88 BPM | WEIGHT: 179.01 LBS | RESPIRATION RATE: 16 BRPM | BODY MASS INDEX: 30.56 KG/M2 | OXYGEN SATURATION: 100 % | TEMPERATURE: 95.4 F

## 2025-05-05 LAB
ALBUMIN SERPL-MCNC: 2 G/DL (ref 3.4–5)
ALBUMIN/GLOB SERPL: 1.1 {RATIO} (ref 1.1–2.2)
ALP SERPL-CCNC: 116 U/L (ref 40–129)
ALT SERPL-CCNC: 10 U/L (ref 10–40)
ANION GAP SERPL CALCULATED.3IONS-SCNC: 9 MMOL/L (ref 9–17)
AST SERPL-CCNC: 18 U/L (ref 15–37)
BILIRUB SERPL-MCNC: 0.3 MG/DL (ref 0–1)
BUN SERPL-MCNC: 18 MG/DL (ref 7–20)
CA-I BLD-SCNC: 1.23 MMOL/L (ref 1.15–1.33)
CALCIUM SERPL-MCNC: 9 MG/DL (ref 8.3–10.6)
CHLORIDE SERPL-SCNC: 108 MMOL/L (ref 99–110)
CO2 SERPL-SCNC: 25 MMOL/L (ref 21–32)
CREAT SERPL-MCNC: 0.9 MG/DL (ref 0.6–1.2)
ERYTHROCYTE [DISTWIDTH] IN BLOOD BY AUTOMATED COUNT: 17.9 % (ref 11.7–14.9)
GFR, ESTIMATED: 59 ML/MIN/1.73M2
GLUCOSE SERPL-MCNC: 78 MG/DL (ref 74–99)
HCT VFR BLD AUTO: 28.9 % (ref 37–47)
HGB BLD-MCNC: 8.8 G/DL (ref 12.5–16)
MAGNESIUM SERPL-MCNC: 2 MG/DL (ref 1.8–2.4)
MCH RBC QN AUTO: 29.9 PG (ref 27–31)
MCHC RBC AUTO-ENTMCNC: 30.4 G/DL (ref 32–36)
MCV RBC AUTO: 98.3 FL (ref 78–100)
MICROORGANISM SPEC CULT: NORMAL
MICROORGANISM SPEC CULT: NORMAL
PHOSPHATE SERPL-MCNC: 2.5 MG/DL (ref 2.5–4.9)
PLATELET # BLD AUTO: 130 K/UL (ref 140–440)
PMV BLD AUTO: 12.1 FL (ref 7.5–11.1)
POTASSIUM SERPL-SCNC: 3.9 MMOL/L (ref 3.5–5.1)
PROT SERPL-MCNC: 3.8 G/DL (ref 6.4–8.2)
RBC # BLD AUTO: 2.94 M/UL (ref 4.2–5.4)
SERVICE CMNT-IMP: NORMAL
SERVICE CMNT-IMP: NORMAL
SODIUM SERPL-SCNC: 141 MMOL/L (ref 136–145)
SPECIMEN DESCRIPTION: NORMAL
SPECIMEN DESCRIPTION: NORMAL
WBC OTHER # BLD: 4.3 K/UL (ref 4–10.5)

## 2025-05-05 PROCEDURE — 84100 ASSAY OF PHOSPHORUS: CPT

## 2025-05-05 PROCEDURE — 97110 THERAPEUTIC EXERCISES: CPT

## 2025-05-05 PROCEDURE — 6370000000 HC RX 637 (ALT 250 FOR IP): Performed by: STUDENT IN AN ORGANIZED HEALTH CARE EDUCATION/TRAINING PROGRAM

## 2025-05-05 PROCEDURE — 6360000002 HC RX W HCPCS: Performed by: HOSPITALIST

## 2025-05-05 PROCEDURE — 80053 COMPREHEN METABOLIC PANEL: CPT

## 2025-05-05 PROCEDURE — 6360000002 HC RX W HCPCS: Performed by: STUDENT IN AN ORGANIZED HEALTH CARE EDUCATION/TRAINING PROGRAM

## 2025-05-05 PROCEDURE — 85027 COMPLETE CBC AUTOMATED: CPT

## 2025-05-05 PROCEDURE — 6370000000 HC RX 637 (ALT 250 FOR IP): Performed by: NURSE PRACTITIONER

## 2025-05-05 PROCEDURE — 2700000000 HC OXYGEN THERAPY PER DAY

## 2025-05-05 PROCEDURE — 83735 ASSAY OF MAGNESIUM: CPT

## 2025-05-05 PROCEDURE — 2580000003 HC RX 258: Performed by: STUDENT IN AN ORGANIZED HEALTH CARE EDUCATION/TRAINING PROGRAM

## 2025-05-05 PROCEDURE — 94761 N-INVAS EAR/PLS OXIMETRY MLT: CPT

## 2025-05-05 PROCEDURE — 82330 ASSAY OF CALCIUM: CPT

## 2025-05-05 RX ORDER — CALCIUM CARBONATE 500 MG/1
500 TABLET, CHEWABLE ORAL 2 TIMES DAILY
Qty: 60 TABLET | Refills: 0
Start: 2025-05-05 | End: 2025-06-04

## 2025-05-05 RX ORDER — HEPARIN 100 UNIT/ML
100 SYRINGE INTRAVENOUS PRN
Status: DISCONTINUED | OUTPATIENT
Start: 2025-05-05 | End: 2025-05-05 | Stop reason: HOSPADM

## 2025-05-05 RX ADMIN — METOPROLOL TARTRATE 50 MG: 50 TABLET, FILM COATED ORAL at 08:40

## 2025-05-05 RX ADMIN — LEVOTHYROXINE SODIUM 150 MCG: 0.15 TABLET ORAL at 05:24

## 2025-05-05 RX ADMIN — CALCIUM CARBONATE 500 MG: 500 TABLET, CHEWABLE ORAL at 08:40

## 2025-05-05 RX ADMIN — CEFTRIAXONE SODIUM 2000 MG: 2 INJECTION, POWDER, FOR SOLUTION INTRAMUSCULAR; INTRAVENOUS at 08:39

## 2025-05-05 RX ADMIN — PANTOPRAZOLE SODIUM 40 MG: 40 TABLET, DELAYED RELEASE ORAL at 08:40

## 2025-05-05 RX ADMIN — SUCRALFATE 1 G: 1 TABLET ORAL at 05:24

## 2025-05-05 RX ADMIN — HEPARIN 100 UNITS: 100 SYRINGE at 12:14

## 2025-05-05 RX ADMIN — ENOXAPARIN SODIUM 30 MG: 100 INJECTION SUBCUTANEOUS at 08:40

## 2025-05-05 NOTE — PROGRESS NOTES
Occupational Therapy    Occupational Therapy Treatment Note  Name: Edna Dowell MRN: [id number] :   1940   Date:  2025   Admission Date: 2025 Room:  [facility]     Primary Problem:  The primary encounter diagnosis was Severe sepsis     Restrictions/Precautions:    General Precautions, Fall Risk     Communication with other providers:   Cleared for treatment by BALTAZAR Bautista.    Subjective:  Patient states:  \"I need to get stronger but I don't feel that well today\"  Pain:   Location, Type, Intensity (0/10 to 10/10):  no pain noted    Objective:    Observation:  pt alert and oriented  Objective Measures:      Treatment, including education:      Therapeutic Exercise:  Pt agreeable for UE exercises in bed.   Pt completed UE exercises to increase strength and ROM to facilitate increase for ADLs and functional mobility.  Pt completed B UEs with 1# dumbbell exercises with curls, shoulder presses( with assistance on R UE due to limited ROM/strength), punch, stirring and wrist curls x 10 reps with mod rest breaks due to fatigue. Pt completed yellow theraband pulls horizontally, punches and upwards x 20 reps with mod rest breaks. Pt required extra time with rest breaks.      Safety Measures: Gait belt used, Left in bed/up in the recliner, Pull/Bed Alarm activated and call light left in reach        Assessment / Impression:        Patient's tolerance of treatment: Good   Adverse Reaction: None  Significant change in status and impact:  None  Barriers to improvement:  Dec strength and endurance    Plan for Next Session:    Continue with POC.    Time in:  0850  Time out:  15  Total treatment time:  25  Billed Units: 2 TE  Electronically signed by:    ALEX Zhu/L PAOLA 2025, 9:26 AM    Previously filed values:

## 2025-05-05 NOTE — DISCHARGE INSTR - COC
Continuity of Care Form    Patient Name: Edna Luu   :  1940  MRN:  [id number]    Admit date:  2025  Discharge date:  25    Code Status Order: Full Code   Advance Directives:     Admitting Physician:  No admitting provider for patient encounter.  PCP: Bob Abraham MD    Discharging Nurse: [facility]/Room#: 3002/3002-A  Discharging Unit Phone Number: [phone]    Emergency Contact:   Extended Emergency Contact Information  Primary Emergency Contact: FELICITA LUU  Mobile Phone: [phone]  Relation: Spouse    Past Surgical History:  Past Surgical History:   Procedure Laterality Date    APPENDECTOMY      CHOLECYSTECTOMY      COLON SURGERY      HYSTERECTOMY (CERVIX STATUS UNKNOWN)      INVASIVE VASCULAR N/A 2024    Venogram lower ext bilat performed by Nazario Ha MD at [facility] CARDIAC CATH LAB    UPPER GASTROINTESTINAL ENDOSCOPY N/A 3/20/2025    ESOPHAGOGASTRODUODENOSCOPY BIOPSY performed by James Steven MD at [facility] ENDOSCOPY       Immunization History:   Immunization History   Administered Date(s) Administered    COVID-19, PFIZER GRAY top, DO NOT Dilute, (age 12 y+), IM, 30 mcg/0.3 mL 2022    COVID-19, PFIZER PURPLE top, DILUTE for use, (age 12 y+), 30mcg/0.3mL 2021, 2021, 2022    COVID-19, PFIZER, , (age 12y+), IM, 30mcg/0.3mL 2023       Active Problems:  Patient Active Problem List   Diagnosis Code    Leg DVT (deep venous thromboembolism), acute, left (HCC) I82.402    Thrombocytopenia, unspecified D69.6    History of DVT (deep vein thrombosis) Z86.718    Generalized weakness R53.1    Moderate malnutrition E44.0    GI bleed K92.2    Sepsis secondary to UTI (HCC) A41.9, N39.0       Isolation/Infection:   Isolation            No Isolation          Patient Infection Status    None to display              Nurse Assessment:  Last Vital Signs: BP (!) 111/50   Pulse 86   Temp 98 °F (36.7 °C) (Oral)   Resp 16   Ht 1.626 m (5' 4\")   05/04/25 0832   Dressing Status Clean;Dry;Intact 05/04/25 2200   Wound Cleansed Not Cleansed 05/04/25 0832   Dressing/Treatment Silicone border 05/04/25 0832   Wound Length (cm) 2.2 cm 05/01/25 0945   Wound Width (cm) 4 cm 05/01/25 0945   Wound Depth (cm) 0.1 cm 05/01/25 0945   Wound Surface Area (cm^2) 8.8 cm^2 05/01/25 0945   Wound Volume (cm^3) 0.88 cm^3 05/01/25 0945   Distance Tunneling (cm) 0 cm 05/01/25 0945   Tunneling Position ___ O'Clock 0 05/01/25 0945   Undermining Starts ___ O'Clock 0 05/01/25 0945   Undermining Ends___ O'Clock 0 05/01/25 0945   Undermining Maxium Distance (cm) 0 05/01/25 0945   Wound Assessment Purple/maroon 05/01/25 0945   Drainage Amount Small (< 25%) 05/01/25 0945   Drainage Description Serosanguinous 05/01/25 0945   Odor None 05/01/25 0945   Lisa-wound Assessment Fragile 05/01/25 0945   Margins Defined edges 05/01/25 0945   Wound Thickness Description not for Pressure Injury Partial thickness 05/01/25 0945   Number of days: 3       Incision 07/18/24 Knee Left;Posterior (Active)   Number of days: 290        Elimination:  Continence:   Bowel: Yes  Bladder: No  Urinary Catheter: None   Colostomy/Ileostomy/Ileal Conduit: No       Date of Last BM: 5/4    Intake/Output Summary (Last 24 hours) at 5/5/2025 0740  Last data filed at 5/5/2025 0523  Gross per 24 hour   Intake --   Output 400 ml   Net -400 ml     I/O last 3 completed shifts:  In: -   Out: 700 [Urine:700]    Safety Concerns:     At Risk for Falls    Impairments/Disabilities:      None      Patient's personal belongings (please select all that are sent with patient):  Glasses    RN SIGNATURE:  Electronically signed by Lily Verma RN on 5/5/25 at 10:49 AM EDT      PHYSICIAN SECTION    Prognosis: Good    Condition at Discharge: Stable    Rehab Potential (if transferring to Rehab): Good    Recommended Labs or Other Treatments After Discharge: CBC/ CMP / Mag/ / Phos/ ionized calcium x 1 week     Physician Certification: I

## 2025-05-05 NOTE — PROGRESS NOTES
BALTAZAR Rice called report to BALTAZAR Carter at [facility] as of this time.  Full report given, all questions answered, contact number given.  12:30 pickup time.

## 2025-05-05 NOTE — PROGRESS NOTES
Pt states her cell phone and  went home with her .  RN swept closets and room and bathroom and no other belongings found.  There is a white bag on back of stretcher containing pt's clothes.   Report given to [facility] [place]. All questions answered.  Pt discharged to [facility] as of 12:55 in company of moshe mancia on stretcher in stable condition in good spirits voicing no complaints.

## 2025-05-05 NOTE — CARE COORDINATION
Pt to go to [facility] once stable. No precert needed.  Pt has Medicare and has had over 3 midnights.  PASS completed and packet started.      CM will need GERARDO to be completed by RN and doctor. If pt is discharged after hours please complete the following.... Call report to [phone]    Place copy of AVS with both GERARDO and any written Rx for pain and anxiety in the packet.  Set up transportation with [place] [phone] and call family.

## 2025-05-05 NOTE — DISCHARGE SUMMARY
V2.0  Discharge Summary    Name:  Edna Dowell /Age/Sex: 1940 (85 y.o. female)   Admit Date: 2025  Discharge Date: 25    MRN & CSN:  [id number] & [id number] Encounter Date and Time 25 10:26 AM EDT    Attending:  No att. providers found Discharging Provider: SHANTAL Nunez - [facility]       Hospital Course:       Brief Problem Based Course:     Sepsis POA  Intractable nausea vomiting  Klebsiella acute cystitis without hematuria  -Resolving  -Recent history of acute gastroenteritis  -IV Rocephin follow-up   - BLC NGTD  -Lactic Acid 2.1>1.6 following IV fluids,   -CT Chest Abd Pelvis: no acute process  -Urine CX: + klebsiella pneumoniae, ampicillin resistant  -Blood Cx x 2: NGTD  -Dispo: Medically stable to transition to rehab. GERARDO form completed      APARNA on CKD stage IIIa, Cr baseline 1.0   -Likely prerenal given elevated urine SG and ketones  -sCr 0.9 improved  -IV team consult for lab draw/IV access and evaluation of port  - Midline placed by IV team. Port is now drawing blood      Electrolyte abnormality  Hypocalcemia   Hypokalemia   Hypophosphatemia   Hypomagnesemia  -Resolved  - Continue calcium supplementation calcium carbonate 500 mg PO BID (used to be on calcium supplements)    -Repeat electrolytes     Elevated troponin and BNP : may be from demand ischemia  Tachycardia, Irregular HR  --160, BNP 3819, Trop 62  -Repeat EKG- ST with PACs  -Chest pain-free,   -EKG: Sinus tachycardia with PAC's   -Cardiology Consult: no further workup at this time  -is on Lopressor 50 mg PO BID   -ECHO: TTE EF 50-50%, normal wall motion, mitral valve calcification   -HR normalized with IVF     Recent GI bleed  -Stable hemoglobin trend  -Continue Protonix and Carafate, Eliquis DC'd      Hx of LLE DVT   -had thrombectomy with Dr. Ha and was placed on Eliquis  -Eliquis has been DC'd due to nose bleeds followed by GI Bleed 2025   - Will right upper extremity swelling will obtain venous Doppler

## 2025-05-05 NOTE — CARE COORDINATION
Pt is on discharge to go to [facility].  [place]vel to  pt at 12:30PM.  [facility] paperwork completed and placed on packet.  Copy of AVS with both Myranda placed in packet.  LSW called pt's RN and she was not available.  Charge RN was told of  time.  LSW spoke with pt and she is aware of  time. LSW offered to call pt's  and she declined stating she will call her  once she gets to [facility].  CRISTOPHERW spoke with Cyndi with [facility] and she was also made aware of  time.

## 2025-05-05 NOTE — PROGRESS NOTES
I did NOT see the patient. The patient was discussed with the SHIVANI. I was available for questions and consultation as needed.       Calcium back to normal limits. Going to SNF.

## 2025-05-05 NOTE — DISCHARGE INSTRUCTIONS
Wound care- rt leg cleanse with NS apply collagen (Puracol) cover with optifoam border change every 3 days and prn. Buttock cleanse with NS apply silicone foam border change every 3 days and prn. Pt is at moderate  risk for skin breakdown AEB Hector. Recommend repositioning with wedges and floating heels with pillows. Follow Hector orders. Call [employer] at [phone] with any issues with rental mattress.

## 2025-05-06 ENCOUNTER — RESULTS FOLLOW-UP (OUTPATIENT)
Dept: EMERGENCY DEPT | Age: 85
End: 2025-05-06

## 2025-05-10 NOTE — PROGRESS NOTES
Physician Progress Note      PATIENT:               JUS   CSN #:                  [id number]  :                       1940  ADMIT DATE:       2025 12:53 PM  DISCH DATE:        2025 12:57 PM  RESPONDING  PROVIDER #:        ARMEN MEHTA APRN - [facility]          QUERY TEXT:    Sepsis is documented in the medical record with WBC 10.9-6.1-4.2-3.5-4.6-4.3,   with mac temp of 99.1. Please provide additional clinical indicators   supportive of your documentation. Or please document if the diagnosis of   sepsis has been ruled out after study.    The clinical indicators include:  Urine CX: + klebsiella pneumoniae, ampicillin resistant    WBC 10.9-6.1-4.2-3.5-4.6-4.3, Lactic Acid 2.1>1.6, EKG sinus tachycardia   121/min, 126-160, Respirations 26-37, 35, BP: 97/61, Temp max of 99.1.    APARNA    Vancocin, Maxipime in the ED  -IV Rocephin follow-up, Received almost 2.5 L IV fluids,cont LR 75 ml/hr  - BLC NGTD  Options provided:  -- Severe Sepsis is present as evidenced by, Please document evidence.  -- Severe Sepsis was ruled out after study  -- Other - I will add my own diagnosis  -- Disagree - Not applicable / Not valid  -- Disagree - Clinically unable to determine / Unknown  -- Refer to Clinical Documentation Reviewer    PROVIDER RESPONSE TEXT:    Severe Sepsis is present as evidenced by Tachycardia and lactic acidosis.   Acute organ dysfunction. Positive urine culture    Query created by: Emma Mckeon on 2025 7:24 AM      QUERY TEXT:    Acute respiratory failure is documented in the medical record in the ED.   Please provide additional clinical indicators supportive of your   documentation. Or please document if the diagnosis of acute respiratory   failure has been ruled out after study.    The clinical indicators include:  UTI    -The patient had respiratory distress, slight tachypnea, SpO2 raging from   85-87%.  Patient was treated with 2-3L NC.    - Consult noted:Respiratory:  Normal breath sounds, No

## 2025-05-25 ENCOUNTER — HOSPITAL ENCOUNTER (INPATIENT)
Age: 85
LOS: 4 days | Discharge: HOSPICE | DRG: 314 | End: 2025-05-29
Attending: EMERGENCY MEDICINE | Admitting: INTERNAL MEDICINE
Payer: MEDICARE

## 2025-05-25 ENCOUNTER — APPOINTMENT (OUTPATIENT)
Dept: GENERAL RADIOLOGY | Age: 85
DRG: 314 | End: 2025-05-25
Payer: MEDICARE

## 2025-05-25 DIAGNOSIS — T80.219A INFECTION OF VENOUS ACCESS PORT, INITIAL ENCOUNTER: ICD-10-CM

## 2025-05-25 DIAGNOSIS — A41.9 SEPTIC SHOCK (HCC): ICD-10-CM

## 2025-05-25 DIAGNOSIS — N17.9 AKI (ACUTE KIDNEY INJURY): Primary | ICD-10-CM

## 2025-05-25 DIAGNOSIS — R65.21 SEPTIC SHOCK (HCC): ICD-10-CM

## 2025-05-25 DIAGNOSIS — R57.9 SHOCK (HCC): ICD-10-CM

## 2025-05-25 DIAGNOSIS — Z86.718 HISTORY OF DVT (DEEP VEIN THROMBOSIS): ICD-10-CM

## 2025-05-25 DIAGNOSIS — E83.42 HYPOMAGNESEMIA: ICD-10-CM

## 2025-05-25 LAB
ALBUMIN SERPL-MCNC: 2.1 G/DL (ref 3.4–5)
ALBUMIN/GLOB SERPL: 0.8 {RATIO} (ref 1.1–2.2)
ALP SERPL-CCNC: 288 U/L (ref 40–129)
ALT SERPL-CCNC: 24 U/L (ref 10–40)
ANION GAP SERPL CALCULATED.3IONS-SCNC: 16 MMOL/L (ref 9–17)
ANION GAP SERPL CALCULATED.3IONS-SCNC: 19 MMOL/L (ref 9–17)
ARTERIAL PATENCY WRIST A: ABNORMAL
AST SERPL-CCNC: 51 U/L (ref 15–37)
BASOPHILS # BLD: 0.07 K/UL
BASOPHILS NFR BLD: 0 % (ref 0–1)
BILIRUB SERPL-MCNC: 0.2 MG/DL (ref 0–1)
BODY TEMPERATURE: 37
BUN SERPL-MCNC: 31 MG/DL (ref 7–20)
BUN SERPL-MCNC: 32 MG/DL (ref 7–20)
CALCIUM SERPL-MCNC: 7 MG/DL (ref 8.3–10.6)
CALCIUM SERPL-MCNC: 7.7 MG/DL (ref 8.3–10.6)
CHLORIDE SERPL-SCNC: 93 MMOL/L (ref 99–110)
CHLORIDE SERPL-SCNC: 96 MMOL/L (ref 99–110)
CO2 SERPL-SCNC: 22 MMOL/L (ref 21–32)
CO2 SERPL-SCNC: 22 MMOL/L (ref 21–32)
COHGB MFR BLD: 0.2 % (ref 0.5–1.5)
CREAT SERPL-MCNC: 3.3 MG/DL (ref 0.6–1.2)
CREAT SERPL-MCNC: 3.5 MG/DL (ref 0.6–1.2)
EOSINOPHIL # BLD: 0.12 K/UL
EOSINOPHILS RELATIVE PERCENT: 1 % (ref 0–3)
ERYTHROCYTE [DISTWIDTH] IN BLOOD BY AUTOMATED COUNT: 19.3 % (ref 11.7–14.9)
GFR, ESTIMATED: 12 ML/MIN/1.73M2
GFR, ESTIMATED: 13 ML/MIN/1.73M2
GLUCOSE SERPL-MCNC: 101 MG/DL (ref 74–99)
GLUCOSE SERPL-MCNC: 90 MG/DL (ref 74–99)
HCO3 VENOUS: 22.6 MMOL/L (ref 22–29)
HCT VFR BLD AUTO: 30.9 % (ref 37–47)
HGB BLD-MCNC: 10 G/DL (ref 12.5–16)
IMM GRANULOCYTES # BLD AUTO: 0.11 K/UL
IMM GRANULOCYTES NFR BLD: 1 %
LACTATE BLDV-SCNC: 3.5 MMOL/L (ref 0.4–2)
LACTATE BLDV-SCNC: 5.2 MMOL/L (ref 0.4–2)
LYMPHOCYTES NFR BLD: 3.03 K/UL
LYMPHOCYTES RELATIVE PERCENT: 18 % (ref 24–44)
MAGNESIUM SERPL-MCNC: 1.2 MG/DL (ref 1.8–2.4)
MAGNESIUM SERPL-MCNC: 1.3 MG/DL (ref 1.8–2.4)
MCH RBC QN AUTO: 30.6 PG (ref 27–31)
MCHC RBC AUTO-ENTMCNC: 32.4 G/DL (ref 32–36)
MCV RBC AUTO: 94.5 FL (ref 78–100)
METHEMOGLOBIN: 0.7 % (ref 0.5–1.5)
MONOCYTES NFR BLD: 0.63 K/UL
MONOCYTES NFR BLD: 4 % (ref 0–5)
NEGATIVE BASE EXCESS, VEN: 2.2 MMOL/L (ref 0–3)
NEUTROPHILS NFR BLD: 77 % (ref 36–66)
NEUTS SEG NFR BLD: 13.08 K/UL
OXYHGB MFR BLD: 41.6 %
PCO2 VENOUS: 38.7 MM HG (ref 38–54)
PH VENOUS: 7.38 (ref 7.32–7.43)
PHOSPHATE SERPL-MCNC: 4.1 MG/DL (ref 2.5–4.9)
PLATELET # BLD AUTO: 211 K/UL (ref 140–440)
PMV BLD AUTO: 12.3 FL (ref 7.5–11.1)
PO2 VENOUS: 27.1 MM HG (ref 23–48)
POTASSIUM SERPL-SCNC: 3.5 MMOL/L (ref 3.5–5.1)
POTASSIUM SERPL-SCNC: 3.6 MMOL/L (ref 3.5–5.1)
PROCALCITONIN SERPL-MCNC: 6.1 NG/ML
PROT SERPL-MCNC: 4.6 G/DL (ref 6.4–8.2)
RBC # BLD AUTO: 3.27 M/UL (ref 4.2–5.4)
SODIUM SERPL-SCNC: 134 MMOL/L (ref 136–145)
SODIUM SERPL-SCNC: 134 MMOL/L (ref 136–145)
TROPONIN I SERPL HS-MCNC: 55 NG/L (ref 0–14)
TROPONIN I SERPL HS-MCNC: 64 NG/L (ref 0–14)
WBC OTHER # BLD: 17 K/UL (ref 4–10.5)

## 2025-05-25 PROCEDURE — 84145 PROCALCITONIN (PCT): CPT

## 2025-05-25 PROCEDURE — 2500000003 HC RX 250 WO HCPCS: Performed by: PHYSICIAN ASSISTANT

## 2025-05-25 PROCEDURE — 84100 ASSAY OF PHOSPHORUS: CPT

## 2025-05-25 PROCEDURE — 2700000000 HC OXYGEN THERAPY PER DAY

## 2025-05-25 PROCEDURE — 6360000002 HC RX W HCPCS: Performed by: PHYSICIAN ASSISTANT

## 2025-05-25 PROCEDURE — 80048 BASIC METABOLIC PNL TOTAL CA: CPT

## 2025-05-25 PROCEDURE — 36591 DRAW BLOOD OFF VENOUS DEVICE: CPT

## 2025-05-25 PROCEDURE — 2580000003 HC RX 258: Performed by: PHYSICIAN ASSISTANT

## 2025-05-25 PROCEDURE — 96375 TX/PRO/DX INJ NEW DRUG ADDON: CPT

## 2025-05-25 PROCEDURE — 87186 SC STD MICRODIL/AGAR DIL: CPT

## 2025-05-25 PROCEDURE — 2000000000 HC ICU R&B

## 2025-05-25 PROCEDURE — 71045 X-RAY EXAM CHEST 1 VIEW: CPT

## 2025-05-25 PROCEDURE — 83735 ASSAY OF MAGNESIUM: CPT

## 2025-05-25 PROCEDURE — 87150 DNA/RNA AMPLIFIED PROBE: CPT

## 2025-05-25 PROCEDURE — 85025 COMPLETE CBC W/AUTO DIFF WBC: CPT

## 2025-05-25 PROCEDURE — 96365 THER/PROPH/DIAG IV INF INIT: CPT

## 2025-05-25 PROCEDURE — 3E043XZ INTRODUCTION OF VASOPRESSOR INTO CENTRAL VEIN, PERCUTANEOUS APPROACH: ICD-10-PCS | Performed by: STUDENT IN AN ORGANIZED HEALTH CARE EDUCATION/TRAINING PROGRAM

## 2025-05-25 PROCEDURE — 82805 BLOOD GASES W/O2 SATURATION: CPT

## 2025-05-25 PROCEDURE — 87040 BLOOD CULTURE FOR BACTERIA: CPT

## 2025-05-25 PROCEDURE — 80053 COMPREHEN METABOLIC PANEL: CPT

## 2025-05-25 PROCEDURE — 83605 ASSAY OF LACTIC ACID: CPT

## 2025-05-25 PROCEDURE — 87641 MR-STAPH DNA AMP PROBE: CPT

## 2025-05-25 PROCEDURE — 6360000002 HC RX W HCPCS: Performed by: INTERNAL MEDICINE

## 2025-05-25 PROCEDURE — 94761 N-INVAS EAR/PLS OXIMETRY MLT: CPT

## 2025-05-25 PROCEDURE — 36415 COLL VENOUS BLD VENIPUNCTURE: CPT

## 2025-05-25 PROCEDURE — 99285 EMERGENCY DEPT VISIT HI MDM: CPT

## 2025-05-25 PROCEDURE — 84484 ASSAY OF TROPONIN QUANT: CPT

## 2025-05-25 PROCEDURE — 6370000000 HC RX 637 (ALT 250 FOR IP): Performed by: INTERNAL MEDICINE

## 2025-05-25 PROCEDURE — 93005 ELECTROCARDIOGRAM TRACING: CPT | Performed by: PHYSICIAN ASSISTANT

## 2025-05-25 RX ORDER — NOREPINEPHRINE BITARTRATE 0.06 MG/ML
1-30 INJECTION, SOLUTION INTRAVENOUS CONTINUOUS
Status: DISCONTINUED | OUTPATIENT
Start: 2025-05-25 | End: 2025-05-26

## 2025-05-25 RX ORDER — LEVOTHYROXINE SODIUM 150 UG/1
150 TABLET ORAL
Status: DISCONTINUED | OUTPATIENT
Start: 2025-05-26 | End: 2025-05-29 | Stop reason: HOSPADM

## 2025-05-25 RX ORDER — SODIUM CHLORIDE 0.9 % (FLUSH) 0.9 %
5-40 SYRINGE (ML) INJECTION EVERY 12 HOURS SCHEDULED
Status: DISCONTINUED | OUTPATIENT
Start: 2025-05-25 | End: 2025-05-29 | Stop reason: HOSPADM

## 2025-05-25 RX ORDER — SODIUM CHLORIDE 0.9 % (FLUSH) 0.9 %
5-40 SYRINGE (ML) INJECTION PRN
Status: DISCONTINUED | OUTPATIENT
Start: 2025-05-25 | End: 2025-05-29 | Stop reason: HOSPADM

## 2025-05-25 RX ORDER — POLYETHYLENE GLYCOL 3350 17 G/17G
17 POWDER, FOR SOLUTION ORAL DAILY PRN
Status: DISCONTINUED | OUTPATIENT
Start: 2025-05-25 | End: 2025-05-29 | Stop reason: HOSPADM

## 2025-05-25 RX ORDER — PANTOPRAZOLE SODIUM 40 MG/10ML
40 INJECTION, POWDER, LYOPHILIZED, FOR SOLUTION INTRAVENOUS DAILY
Status: DISCONTINUED | OUTPATIENT
Start: 2025-05-26 | End: 2025-05-29 | Stop reason: HOSPADM

## 2025-05-25 RX ORDER — SODIUM CHLORIDE, SODIUM LACTATE, POTASSIUM CHLORIDE, AND CALCIUM CHLORIDE .6; .31; .03; .02 G/100ML; G/100ML; G/100ML; G/100ML
1000 INJECTION, SOLUTION INTRAVENOUS ONCE
Status: COMPLETED | OUTPATIENT
Start: 2025-05-25 | End: 2025-05-25

## 2025-05-25 RX ORDER — ONDANSETRON 2 MG/ML
4 INJECTION INTRAMUSCULAR; INTRAVENOUS EVERY 6 HOURS PRN
Status: DISCONTINUED | OUTPATIENT
Start: 2025-05-25 | End: 2025-05-29 | Stop reason: HOSPADM

## 2025-05-25 RX ORDER — CALCIUM GLUCONATE 20 MG/ML
2000 INJECTION, SOLUTION INTRAVENOUS ONCE
Status: DISCONTINUED | OUTPATIENT
Start: 2025-05-25 | End: 2025-05-26

## 2025-05-25 RX ORDER — HEPARIN SODIUM 5000 [USP'U]/ML
5000 INJECTION, SOLUTION INTRAVENOUS; SUBCUTANEOUS EVERY 8 HOURS SCHEDULED
Status: DISCONTINUED | OUTPATIENT
Start: 2025-05-25 | End: 2025-05-27

## 2025-05-25 RX ORDER — SODIUM CHLORIDE 9 MG/ML
INJECTION, SOLUTION INTRAVENOUS PRN
Status: DISCONTINUED | OUTPATIENT
Start: 2025-05-25 | End: 2025-05-29 | Stop reason: HOSPADM

## 2025-05-25 RX ORDER — SODIUM CHLORIDE, SODIUM LACTATE, POTASSIUM CHLORIDE, CALCIUM CHLORIDE 600; 310; 30; 20 MG/100ML; MG/100ML; MG/100ML; MG/100ML
INJECTION, SOLUTION INTRAVENOUS CONTINUOUS
Status: DISCONTINUED | OUTPATIENT
Start: 2025-05-25 | End: 2025-05-25

## 2025-05-25 RX ORDER — MAGNESIUM SULFATE 4 G/50ML
4000 INJECTION INTRAVENOUS ONCE
Status: COMPLETED | OUTPATIENT
Start: 2025-05-25 | End: 2025-05-25

## 2025-05-25 RX ORDER — ONDANSETRON 4 MG/1
4 TABLET, ORALLY DISINTEGRATING ORAL EVERY 8 HOURS PRN
Status: DISCONTINUED | OUTPATIENT
Start: 2025-05-25 | End: 2025-05-29 | Stop reason: HOSPADM

## 2025-05-25 RX ORDER — SUCRALFATE 1 G/1
1 TABLET ORAL
Status: DISCONTINUED | OUTPATIENT
Start: 2025-05-25 | End: 2025-05-29 | Stop reason: HOSPADM

## 2025-05-25 RX ORDER — ACETAMINOPHEN 650 MG/1
650 SUPPOSITORY RECTAL EVERY 6 HOURS PRN
Status: DISCONTINUED | OUTPATIENT
Start: 2025-05-25 | End: 2025-05-29 | Stop reason: HOSPADM

## 2025-05-25 RX ORDER — ACETAMINOPHEN 325 MG/1
650 TABLET ORAL EVERY 6 HOURS PRN
Status: DISCONTINUED | OUTPATIENT
Start: 2025-05-25 | End: 2025-05-29 | Stop reason: HOSPADM

## 2025-05-25 RX ORDER — ALBUTEROL SULFATE 0.83 MG/ML
2.5 SOLUTION RESPIRATORY (INHALATION)
Status: DISCONTINUED | OUTPATIENT
Start: 2025-05-25 | End: 2025-05-29 | Stop reason: HOSPADM

## 2025-05-25 RX ORDER — CALCIUM GLUCONATE 20 MG/ML
2000 INJECTION, SOLUTION INTRAVENOUS ONCE
Status: COMPLETED | OUTPATIENT
Start: 2025-05-25 | End: 2025-05-26

## 2025-05-25 RX ADMIN — SUCRALFATE 1 G: 1 TABLET ORAL at 21:50

## 2025-05-25 RX ADMIN — MAGNESIUM SULFATE HEPTAHYDRATE 4000 MG: 80 INJECTION, SOLUTION INTRAVENOUS at 18:19

## 2025-05-25 RX ADMIN — VASOPRESSIN 0.03 UNITS/MIN: 20 INJECTION INTRAVENOUS at 20:42

## 2025-05-25 RX ADMIN — HEPARIN SODIUM 5000 UNITS: 5000 INJECTION INTRAVENOUS; SUBCUTANEOUS at 21:47

## 2025-05-25 RX ADMIN — SODIUM CHLORIDE, PRESERVATIVE FREE 10 ML: 5 INJECTION INTRAVENOUS at 20:48

## 2025-05-25 RX ADMIN — CEFEPIME 1000 MG: 1 INJECTION, POWDER, FOR SOLUTION INTRAMUSCULAR; INTRAVENOUS at 17:46

## 2025-05-25 RX ADMIN — CALCIUM GLUCONATE 2000 MG: 20 INJECTION, SOLUTION INTRAVENOUS at 23:33

## 2025-05-25 RX ADMIN — SODIUM CHLORIDE, SODIUM LACTATE, POTASSIUM CHLORIDE, AND CALCIUM CHLORIDE 1000 ML: .6; .31; .03; .02 INJECTION, SOLUTION INTRAVENOUS at 17:37

## 2025-05-25 RX ADMIN — VANCOMYCIN HYDROCHLORIDE 2000 MG: 5 INJECTION, POWDER, LYOPHILIZED, FOR SOLUTION INTRAVENOUS at 19:46

## 2025-05-25 RX ADMIN — SODIUM CHLORIDE, SODIUM LACTATE, POTASSIUM CHLORIDE, AND CALCIUM CHLORIDE 1000 ML: .6; .31; .03; .02 INJECTION, SOLUTION INTRAVENOUS at 16:53

## 2025-05-25 RX ADMIN — NOREPINEPHRINE BITARTRATE 5 MCG/MIN: 0.06 INJECTION, SOLUTION INTRAVENOUS at 16:58

## 2025-05-25 ASSESSMENT — PAIN - FUNCTIONAL ASSESSMENT: PAIN_FUNCTIONAL_ASSESSMENT: NONE - DENIES PAIN

## 2025-05-25 ASSESSMENT — PAIN SCALES - GENERAL
PAINLEVEL_OUTOF10: 0
PAINLEVEL_OUTOF10: 0

## 2025-05-25 NOTE — ED PROVIDER NOTES
eMERGENCY dEPARTMENT eNCOUnter    Attending note    I cared for and evaluated the patient in conjunction with the ED Advanced Practice Provider. I performed a substantive portion of the visit including all aspects of the medical decision-making.    HPI/Physical Exam/Medical Decision Making  Edna Dowell is a 85 y.o. female here, via EMS, from [facility] for evaluation of hypotension.  The patient was recently admitted with urosepsis due to Klebsiella.  She has completed her antibiotics.  On arrival to the ER, the patient has a systolic blood pressure in the 40s, but is awake and alert.  She states she feels generally unwell and weak but cannot specify further.  She has signed DNRCC paperwork on the chart.  Please see SHIVANI note for further details.     Vitals:   ED Triage Vitals   Encounter Vitals Group      BP --       Systolic BP Percentile --       Diastolic BP Percentile --       Pulse 05/25/25 1624 100      Respirations 05/25/25 1624 (!) 35      Temp 05/25/25 1623 98.2 °F (36.8 °C)      Temp Source 05/25/25 1623 Axillary      SpO2 --       Weight - Scale 05/25/25 1624 77.1 kg (170 lb)      Height 05/25/25 1624 1.626 m (5' 4\")      Head Circumference --       Peak Flow --       Pain Score --       Pain Loc --       Pain Education --       Exclude from Growth Chart --        EKG Interpretation  Interpreted by me  Compared to 5/30/2025  Rhythm: normal sinus   Rate: normal 98  Axis: normal  Ectopy: premature supraventricular complexes  Conduction: normal  ST Segments: no acute change  T Waves: no acute change  Clinical Impression: normal sinus rhythm, premature supraventricular complexes    XR CHEST PORTABLE (Final result)  Result time 05/25/25 18:05:55  Final result by Meghan Denton MD (05/25/25 18:05:55)                Narrative:    EXAM DESCRIPTION:  XR CHEST PORTABLE    CLINICAL HISTORY:  85 years  Female;  SOB    COMPARISON: 3/19/2025    FINDINGS:  Lungs: Patchy retrocardiac atelectasis or

## 2025-05-25 NOTE — ED PROVIDER NOTES
[facility] EMERGENCY DEPARTMENT  EMERGENCY DEPARTMENT ENCOUNTER        Pt Name: Edna Dowell  MRN: [id number]  Birthdate 1940  Date of evaluation: 5/25/2025  Provider: Hang Velázquez PA-C  PCP: Bob Abraham MD  Note Started: 4:42 PM EDT 5/25/25      SHIVANI. I have evaluated this patient.        CHIEF COMPLAINT       Chief Complaint   Patient presents with    Hypotension       HISTORY OF PRESENT ILLNESS: 1 or more Elements     History From: patient, EMS    Limitations to history : None    Social Determinants Significantly Affecting Health : None    Chief Complaint: hypotension    Edna Dowell is a 85 y.o. female with past medical history of recent admission for sepsis for UTI, prior DVT who presents with hypotension.  Reported hypotension at her nursing home today.  Patient arrives with blood pressure in the 50s systolic here, she is alert, oriented, does have significant third spacing which may be affecting her blood pressure reading.  She denies any fever, cough, vomiting, blood in stool.  She does admit to diarrhea.    Medical record reviewed patient admitted 4/30/2025 through 5/22/2025 for Klebsiella pneumonia UTI with sepsis, had DVT also..  Notes appear that patient has finished her antibiotic course.    1640 - patient alert, oriented, appears capable of making own decisions.  Discussed her CODE STATUS, she has signed DNR-CCA paperwork with her from nursing home, patient wishes to stay no chest compressions, no intubation, DNR-CCA status.  ER attending, Dr. Mclaughlin, made aware of severely ill patient, will see.    Nursing Notes were all reviewed and agreed with or any disagreements were addressed in the HPI.    REVIEW OF SYSTEMS :      Review of Systems    Positives and Pertinent negatives as per HPI.     SURGICAL HISTORY     Past Surgical History:   Procedure Laterality Date    APPENDECTOMY      CHOLECYSTECTOMY      COLON SURGERY      HYSTERECTOMY (CERVIX STATUS UNKNOWN)      INVASIVE

## 2025-05-25 NOTE — ED TRIAGE NOTES
Pt arrived to ED via EMS from [facility] with c/o hypotension. Pt a/o on arrival. Pitting edema noted to extremities. Mottling noted to bilateral legs and feet. DNR-CCA

## 2025-05-25 NOTE — ED NOTES
Pt arrived with power port to right chest accessed. Unable to get blood return. Lillian LEDESMA removed port access and re accessed. Power port 3/4\". Blood return noted and able to be flushed.

## 2025-05-26 ENCOUNTER — APPOINTMENT (OUTPATIENT)
Dept: GENERAL RADIOLOGY | Age: 85
DRG: 314 | End: 2025-05-26
Payer: MEDICARE

## 2025-05-26 LAB
ANION GAP SERPL CALCULATED.3IONS-SCNC: 15 MMOL/L (ref 9–17)
ANION GAP SERPL CALCULATED.3IONS-SCNC: 16 MMOL/L (ref 9–17)
ANION GAP SERPL CALCULATED.3IONS-SCNC: 17 MMOL/L (ref 9–17)
ANION GAP SERPL CALCULATED.3IONS-SCNC: 19 MMOL/L (ref 9–17)
BUN SERPL-MCNC: 32 MG/DL (ref 7–20)
BUN SERPL-MCNC: 33 MG/DL (ref 7–20)
BUN SERPL-MCNC: 35 MG/DL (ref 7–20)
BUN SERPL-MCNC: 36 MG/DL (ref 7–20)
CA-I BLD-SCNC: 0.9 MMOL/L (ref 1.15–1.33)
CALCIUM SERPL-MCNC: 7.4 MG/DL (ref 8.3–10.6)
CALCIUM SERPL-MCNC: 7.5 MG/DL (ref 8.3–10.6)
CALCIUM SERPL-MCNC: 8.2 MG/DL (ref 8.3–10.6)
CALCIUM SERPL-MCNC: 8.6 MG/DL (ref 8.3–10.6)
CHLORIDE SERPL-SCNC: 93 MMOL/L (ref 99–110)
CHLORIDE SERPL-SCNC: 97 MMOL/L (ref 99–110)
CHLORIDE SERPL-SCNC: 97 MMOL/L (ref 99–110)
CHLORIDE SERPL-SCNC: 98 MMOL/L (ref 99–110)
CO2 SERPL-SCNC: 19 MMOL/L (ref 21–32)
CO2 SERPL-SCNC: 22 MMOL/L (ref 21–32)
CO2 SERPL-SCNC: 22 MMOL/L (ref 21–32)
CO2 SERPL-SCNC: 23 MMOL/L (ref 21–32)
CREAT SERPL-MCNC: 3.4 MG/DL (ref 0.6–1.2)
CREAT SERPL-MCNC: 3.4 MG/DL (ref 0.6–1.2)
CREAT SERPL-MCNC: 3.5 MG/DL (ref 0.6–1.2)
CREAT SERPL-MCNC: 3.6 MG/DL (ref 0.6–1.2)
CREAT UR-MCNC: 181 MG/DL (ref 28–217)
DATE LAST DOSE: NORMAL
EKG ATRIAL RATE: 98 BPM
EKG DIAGNOSIS: NORMAL
EKG P AXIS: 27 DEGREES
EKG P-R INTERVAL: 146 MS
EKG Q-T INTERVAL: 168 MS
EKG QRS DURATION: 66 MS
EKG QTC CALCULATION (BAZETT): 214 MS
EKG R AXIS: -5 DEGREES
EKG T AXIS: 172 DEGREES
EKG VENTRICULAR RATE: 98 BPM
GFR, ESTIMATED: 12 ML/MIN/1.73M2
GFR, ESTIMATED: 13 ML/MIN/1.73M2
GLUCOSE SERPL-MCNC: 103 MG/DL (ref 74–99)
GLUCOSE SERPL-MCNC: 103 MG/DL (ref 74–99)
GLUCOSE SERPL-MCNC: 104 MG/DL (ref 74–99)
GLUCOSE SERPL-MCNC: 117 MG/DL (ref 74–99)
LACTATE BLDV-SCNC: 1.4 MMOL/L (ref 0.4–2)
LACTATE BLDV-SCNC: 2.6 MMOL/L (ref 0.4–2)
LACTATE BLDV-SCNC: 2.8 MMOL/L (ref 0.4–2)
LACTATE BLDV-SCNC: 4.6 MMOL/L (ref 0.4–2)
MAGNESIUM SERPL-MCNC: 1.4 MG/DL (ref 1.8–2.4)
MAGNESIUM SERPL-MCNC: 2 MG/DL (ref 1.8–2.4)
MAGNESIUM SERPL-MCNC: 2.1 MG/DL (ref 1.8–2.4)
MAGNESIUM SERPL-MCNC: 2.9 MG/DL (ref 1.8–2.4)
MRSA, DNA, NASAL: DETECTED
PHOSPHATE SERPL-MCNC: 4.4 MG/DL (ref 2.5–4.9)
PHOSPHATE SERPL-MCNC: 4.5 MG/DL (ref 2.5–4.9)
PHOSPHATE SERPL-MCNC: 4.7 MG/DL (ref 2.5–4.9)
PHOSPHATE SERPL-MCNC: 5 MG/DL (ref 2.5–4.9)
POTASSIUM SERPL-SCNC: 3.5 MMOL/L (ref 3.5–5.1)
POTASSIUM SERPL-SCNC: 3.7 MMOL/L (ref 3.5–5.1)
POTASSIUM SERPL-SCNC: 3.8 MMOL/L (ref 3.5–5.1)
POTASSIUM SERPL-SCNC: 3.8 MMOL/L (ref 3.5–5.1)
SODIUM SERPL-SCNC: 132 MMOL/L (ref 136–145)
SODIUM SERPL-SCNC: 134 MMOL/L (ref 136–145)
SODIUM SERPL-SCNC: 134 MMOL/L (ref 136–145)
SODIUM SERPL-SCNC: 136 MMOL/L (ref 136–145)
SODIUM UR-SCNC: <20 MMOL/L (ref 40–220)
SPECIMEN DESCRIPTION: ABNORMAL
TME LAST DOSE: NORMAL H
TOTAL PROTEIN, URINE: 135 MG/DL
URINE TOTAL PROTEIN CREATININE RATIO: 0.75 (ref 0–0.2)
VANCOMYCIN DOSE: NORMAL MG
VANCOMYCIN SERPL-MCNC: 19.1 UG/ML (ref 10–20)

## 2025-05-26 PROCEDURE — 4A133B1 MONITORING OF ARTERIAL PRESSURE, PERIPHERAL, PERCUTANEOUS APPROACH: ICD-10-PCS | Performed by: STUDENT IN AN ORGANIZED HEALTH CARE EDUCATION/TRAINING PROGRAM

## 2025-05-26 PROCEDURE — 2580000003 HC RX 258: Performed by: STUDENT IN AN ORGANIZED HEALTH CARE EDUCATION/TRAINING PROGRAM

## 2025-05-26 PROCEDURE — 6360000002 HC RX W HCPCS: Performed by: INTERNAL MEDICINE

## 2025-05-26 PROCEDURE — 82330 ASSAY OF CALCIUM: CPT

## 2025-05-26 PROCEDURE — 84100 ASSAY OF PHOSPHORUS: CPT

## 2025-05-26 PROCEDURE — 84300 ASSAY OF URINE SODIUM: CPT

## 2025-05-26 PROCEDURE — 02HV33Z INSERTION OF INFUSION DEVICE INTO SUPERIOR VENA CAVA, PERCUTANEOUS APPROACH: ICD-10-PCS | Performed by: STUDENT IN AN ORGANIZED HEALTH CARE EDUCATION/TRAINING PROGRAM

## 2025-05-26 PROCEDURE — 2580000003 HC RX 258: Performed by: PHYSICIAN ASSISTANT

## 2025-05-26 PROCEDURE — 71045 X-RAY EXAM CHEST 1 VIEW: CPT

## 2025-05-26 PROCEDURE — 83735 ASSAY OF MAGNESIUM: CPT

## 2025-05-26 PROCEDURE — 6370000000 HC RX 637 (ALT 250 FOR IP): Performed by: INTERNAL MEDICINE

## 2025-05-26 PROCEDURE — 2700000000 HC OXYGEN THERAPY PER DAY

## 2025-05-26 PROCEDURE — 51798 US URINE CAPACITY MEASURE: CPT

## 2025-05-26 PROCEDURE — 2500000003 HC RX 250 WO HCPCS: Performed by: PHYSICIAN ASSISTANT

## 2025-05-26 PROCEDURE — 87040 BLOOD CULTURE FOR BACTERIA: CPT

## 2025-05-26 PROCEDURE — 83605 ASSAY OF LACTIC ACID: CPT

## 2025-05-26 PROCEDURE — 2000000000 HC ICU R&B

## 2025-05-26 PROCEDURE — 6360000002 HC RX W HCPCS: Performed by: STUDENT IN AN ORGANIZED HEALTH CARE EDUCATION/TRAINING PROGRAM

## 2025-05-26 PROCEDURE — 80048 BASIC METABOLIC PNL TOTAL CA: CPT

## 2025-05-26 PROCEDURE — P9047 ALBUMIN (HUMAN), 25%, 50ML: HCPCS | Performed by: STUDENT IN AN ORGANIZED HEALTH CARE EDUCATION/TRAINING PROGRAM

## 2025-05-26 PROCEDURE — 80202 ASSAY OF VANCOMYCIN: CPT

## 2025-05-26 PROCEDURE — 99222 1ST HOSP IP/OBS MODERATE 55: CPT | Performed by: SURGERY

## 2025-05-26 PROCEDURE — 6360000002 HC RX W HCPCS: Performed by: PHYSICIAN ASSISTANT

## 2025-05-26 PROCEDURE — 2580000003 HC RX 258: Performed by: INTERNAL MEDICINE

## 2025-05-26 PROCEDURE — 93010 ELECTROCARDIOGRAM REPORT: CPT | Performed by: INTERNAL MEDICINE

## 2025-05-26 PROCEDURE — 4A133J1 MONITORING OF ARTERIAL PULSE, PERIPHERAL, PERCUTANEOUS APPROACH: ICD-10-PCS | Performed by: STUDENT IN AN ORGANIZED HEALTH CARE EDUCATION/TRAINING PROGRAM

## 2025-05-26 PROCEDURE — 84156 ASSAY OF PROTEIN URINE: CPT

## 2025-05-26 PROCEDURE — 94761 N-INVAS EAR/PLS OXIMETRY MLT: CPT

## 2025-05-26 PROCEDURE — 6370000000 HC RX 637 (ALT 250 FOR IP): Performed by: STUDENT IN AN ORGANIZED HEALTH CARE EDUCATION/TRAINING PROGRAM

## 2025-05-26 PROCEDURE — 03HY32Z INSERTION OF MONITORING DEVICE INTO UPPER ARTERY, PERCUTANEOUS APPROACH: ICD-10-PCS | Performed by: STUDENT IN AN ORGANIZED HEALTH CARE EDUCATION/TRAINING PROGRAM

## 2025-05-26 PROCEDURE — 82570 ASSAY OF URINE CREATININE: CPT

## 2025-05-26 PROCEDURE — 2500000003 HC RX 250 WO HCPCS: Performed by: STUDENT IN AN ORGANIZED HEALTH CARE EDUCATION/TRAINING PROGRAM

## 2025-05-26 RX ORDER — MUPIROCIN 20 MG/G
OINTMENT TOPICAL 2 TIMES DAILY
Status: DISCONTINUED | OUTPATIENT
Start: 2025-05-26 | End: 2025-05-29 | Stop reason: HOSPADM

## 2025-05-26 RX ORDER — FOLIC ACID 1 MG/1
1 TABLET ORAL DAILY
Status: DISCONTINUED | OUTPATIENT
Start: 2025-05-26 | End: 2025-05-29 | Stop reason: HOSPADM

## 2025-05-26 RX ORDER — FUROSEMIDE 10 MG/ML
60 INJECTION INTRAMUSCULAR; INTRAVENOUS ONCE
Status: COMPLETED | OUTPATIENT
Start: 2025-05-26 | End: 2025-05-26

## 2025-05-26 RX ORDER — NOREPINEPHRINE BITARTRATE 0.06 MG/ML
1-30 INJECTION, SOLUTION INTRAVENOUS CONTINUOUS
Status: DISCONTINUED | OUTPATIENT
Start: 2025-05-26 | End: 2025-05-27

## 2025-05-26 RX ORDER — CHLORHEXIDINE GLUCONATE 40 MG/ML
SOLUTION TOPICAL DAILY
Status: DISCONTINUED | OUTPATIENT
Start: 2025-05-26 | End: 2025-05-29 | Stop reason: HOSPADM

## 2025-05-26 RX ORDER — MAGNESIUM SULFATE 4 G/50ML
4000 INJECTION INTRAVENOUS ONCE
Status: COMPLETED | OUTPATIENT
Start: 2025-05-26 | End: 2025-05-26

## 2025-05-26 RX ORDER — MAGNESIUM SULFATE 4 G/50ML
4000 INJECTION INTRAVENOUS ONCE
Status: DISCONTINUED | OUTPATIENT
Start: 2025-05-26 | End: 2025-05-29 | Stop reason: HOSPADM

## 2025-05-26 RX ORDER — ZINC SULFATE 50(220)MG
50 CAPSULE ORAL DAILY
Status: DISCONTINUED | OUTPATIENT
Start: 2025-05-26 | End: 2025-05-29 | Stop reason: HOSPADM

## 2025-05-26 RX ORDER — ALBUMIN (HUMAN) 12.5 G/50ML
12.5 SOLUTION INTRAVENOUS EVERY 6 HOURS
Status: DISPENSED | OUTPATIENT
Start: 2025-05-26 | End: 2025-05-29

## 2025-05-26 RX ORDER — M-VIT,TX,IRON,MINS/CALC/FOLIC 27MG-0.4MG
1 TABLET ORAL DAILY
Status: DISCONTINUED | OUTPATIENT
Start: 2025-05-26 | End: 2025-05-29 | Stop reason: HOSPADM

## 2025-05-26 RX ORDER — LANOLIN ALCOHOL/MO/W.PET/CERES
100 CREAM (GRAM) TOPICAL DAILY
Status: DISCONTINUED | OUTPATIENT
Start: 2025-05-26 | End: 2025-05-29 | Stop reason: HOSPADM

## 2025-05-26 RX ORDER — HYDROCORTISONE SODIUM SUCCINATE 100 MG/2ML
100 INJECTION INTRAMUSCULAR; INTRAVENOUS EVERY 8 HOURS
Status: DISCONTINUED | OUTPATIENT
Start: 2025-05-26 | End: 2025-05-27

## 2025-05-26 RX ADMIN — HYDROCORTISONE SODIUM SUCCINATE 100 MG: 100 INJECTION, POWDER, FOR SOLUTION INTRAMUSCULAR; INTRAVENOUS at 18:33

## 2025-05-26 RX ADMIN — VANCOMYCIN HYDROCHLORIDE 1000 MG: 1 INJECTION, POWDER, LYOPHILIZED, FOR SOLUTION INTRAVENOUS at 16:37

## 2025-05-26 RX ADMIN — HYDROCORTISONE SODIUM SUCCINATE 100 MG: 100 INJECTION, POWDER, FOR SOLUTION INTRAMUSCULAR; INTRAVENOUS at 09:48

## 2025-05-26 RX ADMIN — CEFEPIME 1000 MG: 1 INJECTION, POWDER, FOR SOLUTION INTRAMUSCULAR; INTRAVENOUS at 05:27

## 2025-05-26 RX ADMIN — Medication 100 MG: at 09:48

## 2025-05-26 RX ADMIN — SODIUM CHLORIDE, PRESERVATIVE FREE 10 ML: 5 INJECTION INTRAVENOUS at 20:42

## 2025-05-26 RX ADMIN — NOREPINEPHRINE BITARTRATE 30 MCG/MIN: 0.06 INJECTION, SOLUTION INTRAVENOUS at 04:01

## 2025-05-26 RX ADMIN — LEVOTHYROXINE SODIUM 150 MCG: 0.15 TABLET ORAL at 06:38

## 2025-05-26 RX ADMIN — CEFEPIME 1000 MG: 1 INJECTION, POWDER, FOR SOLUTION INTRAMUSCULAR; INTRAVENOUS at 18:36

## 2025-05-26 RX ADMIN — MUPIROCIN: 20 OINTMENT TOPICAL at 20:41

## 2025-05-26 RX ADMIN — SUCRALFATE 1 G: 1 TABLET ORAL at 18:33

## 2025-05-26 RX ADMIN — ALBUMIN (HUMAN) 12.5 G: 0.25 INJECTION, SOLUTION INTRAVENOUS at 09:44

## 2025-05-26 RX ADMIN — ALBUMIN (HUMAN) 12.5 G: 0.25 INJECTION, SOLUTION INTRAVENOUS at 21:50

## 2025-05-26 RX ADMIN — Medication 1 TABLET: at 09:48

## 2025-05-26 RX ADMIN — ALBUMIN (HUMAN) 12.5 G: 0.25 INJECTION, SOLUTION INTRAVENOUS at 16:41

## 2025-05-26 RX ADMIN — SUCRALFATE 1 G: 1 TABLET ORAL at 06:38

## 2025-05-26 RX ADMIN — ZINC SULFATE 220 MG (50 MG) CAPSULE 50 MG: CAPSULE at 09:48

## 2025-05-26 RX ADMIN — HEPARIN SODIUM 5000 UNITS: 5000 INJECTION INTRAVENOUS; SUBCUTANEOUS at 13:36

## 2025-05-26 RX ADMIN — SUCRALFATE 1 G: 1 TABLET ORAL at 20:42

## 2025-05-26 RX ADMIN — PANTOPRAZOLE SODIUM 40 MG: 40 INJECTION, POWDER, FOR SOLUTION INTRAVENOUS at 09:47

## 2025-05-26 RX ADMIN — CALCIUM CHLORIDE 2000 MG: 100 INJECTION, SOLUTION INTRAVENOUS at 12:09

## 2025-05-26 RX ADMIN — MAGNESIUM SULFATE HEPTAHYDRATE 4000 MG: 80 INJECTION, SOLUTION INTRAVENOUS at 08:38

## 2025-05-26 RX ADMIN — FUROSEMIDE 60 MG: 10 INJECTION, SOLUTION INTRAMUSCULAR; INTRAVENOUS at 13:35

## 2025-05-26 RX ADMIN — HEPARIN SODIUM 5000 UNITS: 5000 INJECTION INTRAVENOUS; SUBCUTANEOUS at 05:33

## 2025-05-26 RX ADMIN — SODIUM CHLORIDE, PRESERVATIVE FREE 10 ML: 5 INJECTION INTRAVENOUS at 09:49

## 2025-05-26 RX ADMIN — ONDANSETRON 4 MG: 2 INJECTION INTRAMUSCULAR; INTRAVENOUS at 07:34

## 2025-05-26 RX ADMIN — VASOPRESSIN 0.03 UNITS/MIN: 20 INJECTION INTRAVENOUS at 06:26

## 2025-05-26 RX ADMIN — FOLIC ACID 1 MG: 1 TABLET ORAL at 09:48

## 2025-05-26 RX ADMIN — HEPARIN SODIUM 5000 UNITS: 5000 INJECTION INTRAVENOUS; SUBCUTANEOUS at 21:51

## 2025-05-26 RX ADMIN — NOREPINEPHRINE BITARTRATE 8 MCG/MIN: 0.06 INJECTION, SOLUTION INTRAVENOUS at 14:27

## 2025-05-26 RX ADMIN — SUCRALFATE 1 G: 1 TABLET ORAL at 09:48

## 2025-05-26 ASSESSMENT — PAIN SCALES - GENERAL
PAINLEVEL_OUTOF10: 0
PAINLEVEL_OUTOF10: 0

## 2025-05-26 NOTE — PROGRESS NOTES
4 Eyes Skin Assessment     NAME:  Edna Dowell  YOB: 1940  MEDICAL RECORD NUMBER:  [id number]    The patient is being assessed for  Transfer to New Unit    I agree that at least one RN has performed a thorough Head to Toe Skin Assessment on the patient. ALL assessment sites listed below have been assessed.      Areas assessed by both nurses:    Head, Face, Ears, Shoulders, Back, Chest, Arms, Elbows, Hands, Sacrum. Buttock, Coccyx, Ischium, Legs. Feet and Heels, Under Medical Devices , and Other          Does the Patient have a Wound? Yes wound(s) were present on assessment. LDA wound assessment was Initiated and completed by RN       Hecotr Prevention initiated by RN: Yes  Wound Care Orders initiated by RN: Yes    Pressure Injury (Stage 3,4, Unstageable, DTI, NWPT, and Complex wounds) if present, place Wound referral order by RN under : Yes    New Ostomies, if present place, Ostomy referral order under : Yes     Nurse 1 eSignature: Electronically signed by Blanka Loco RN on 5/26/25 at 3:09 AM EDT    **SHARE this note so that the co-signing nurse can place an eSignature**    Nurse 2 eSignature: Electronically signed by Sailaja Parsons RN on 5/26/25 at 4:25 AM EDT

## 2025-05-26 NOTE — PROGRESS NOTES
PHARMACY VANCOMYCIN MONITORING SERVICE  Pharmacy consulted by Rajwinder Murray PA-C for monitoring and adjustment.    Indication for treatment: Vancomycin indication: Other: Septic shock  Goal trough: Trough Goal: 15-20 mcg/mL  AUC/EMILY: 400-600    Risk Factors for MRSA Identified:   Hospitalization within the past 90 days, Received IV antibiotics within the past 90 days    Pertinent Laboratory Values:   Temp Readings from Last 3 Encounters:   05/26/25 98.2 °F (36.8 °C) (Oral)   05/05/25 (!) 95.4 °F (35.2 °C) (Oral)   03/22/25 97.5 °F (36.4 °C) (Oral)     Recent Labs     05/25/25  1640   WBC 17.0*     Recent Labs     05/25/25  2041 05/26/25  0300 05/26/25  1042   BUN 31* 32* 33*   CREATININE 3.3* 3.4* 3.5*     Estimated Creatinine Clearance: 12 mL/min (A) (based on SCr of 3.5 mg/dL (H)).    Intake/Output Summary (Last 24 hours) at 5/26/2025 1429  Last data filed at 5/26/2025 1336  Gross per 24 hour   Intake 2134.56 ml   Output --   Net 2134.56 ml     Last Encounter Weight:  Wt Readings from Last 3 Encounters:   05/26/25 80.4 kg (177 lb 4 oz)   05/04/25 81.2 kg (179 lb 0.2 oz)   03/19/25 74.8 kg (164 lb 12.8 oz)       Pertinent Cultures:   Date    Source    Results  5/25   Blood    MRSA  5/25   MRSA by PCR  Positive  5/26                            Blood                                       Ordered      Vancomycin level:   TROUGH:  No results for input(s): \"VANCOTROUGH\" in the last 72 hours.  RANDOM:    Recent Labs     05/26/25  0500   VANCORANDOM 19.1       Assessment:  HPI: 85-year-old female presented to the ED from a nursing home with hypotension. On arrival, her systolic blood pressure was in the 50s. She was recently hospitalized for sepsis due to Klebsiella UTI  5/26 - Noted MRSA in the blood  SCr, BUN, and urine output:   APARNA on CKD IIIa, SCR very elevated @3.5 (upward trend) (baseline ~1)  BUN elevated @33, upward trend  No UOP data  Day(s) of therapy: 2 of 5  Vancomycin concentration:  5/26 - 19.1, 9 hour

## 2025-05-26 NOTE — PROCEDURES
PROCEDURE NOTE  Date: 5/26/2025   Name: Edna Dowell  YOB: 1940    Central Venous Catheter Insertion Procedure Note   Procedure: Insertion of Central Venous Catheter   Procedure Clinician: Cele Lino MD   Procedure Assistant: None   Indications: Vescicant agents   Size and location: 13 fr, Left IJ  Attempts: 2 (RIJ could not pass wire probably due to port)    Procedure Details:   Informed consent was obtained for the procedure, including sedation. Risks of lung perforation, hemorrhage, arrhythmia, and adverse drug reaction were discussed.     Under sterile conditions the skin above the Left IJ was prepped with chlorhexidine and covered with a sterile drape. Local anesthesia was applied to the skin and subcutaneous tissues. Ultrasound was used to localize the vein and an 18-gauge needle was then inserted into the vein. A drop test was performed and was negative for any evidence of arterial flow. A guide wire was then passed easily through the catheter. There were no arrhythmias. Ultrasound was used to identify the guidewire once in the vein. The catheter was then withdrawn. A 13 Fr 20 cm Trialysis was then inserted into the vessel over the guide wire. The guidewire was then removed with the tip intact, and witnessed by bedside nurse. The catheter was sutured into place.     Findings:   There were no changes to vital signs. Catheter was flushed with 20 mL NS. Patient tolerated the procedure well.     Lung ultrasound:   Pleural sliding? yes  Lung point? no  A lines? yes  B lines? no  Consolidation? Not assessed   Pleural effusion? Not assessed     Recommendations:   CXR ordered to verify placement.

## 2025-05-26 NOTE — PROCEDURES
PROCEDURE NOTE  Date: 5/26/2025   Name: Edna Dowell  YOB: 1940    Arterial Catheter Insertion Procedure Note   Procedure: Insertion of Arterial Line   Procedure Clinician: Cele Lino MD   Procedure Assistant: None   Indications: frequent ABGs, Frequent labs, BP monitoring   Size and location: 20g, 12 cm, Left brachial artery  Attempts: 3 (2 attempts in right brachial artery) Radial and ulnar artery too small   Procedure Details:   Informed consent was obtained for the procedure, including sedation. Risks of hemorrhage, ischemia, infection, and adverse drug reaction were discussed.   Under sterile conditions the skin above the Left brachial artery was prepped with chlorhexidine. Local anesthesia was applied to the skin and subcutaneous tissues. An Arrow kit was used to insert a 20-gauge needle into the artery. A guide wire was then passed easily through the needle without resistance. A 20 gauge catheter was then inserted into the vessel over the guide wire. The guidewire was then removed with the tip intact. The catheter was then secured into place.   Findings:   There were no changes to vital signs. Catheter was flushed with 20 mL NS. Patient tolerated the procedure well.

## 2025-05-26 NOTE — PLAN OF CARE
Problem: Safety - Adult  Goal: Free from fall injury  Outcome: Progressing     Problem: Skin/Tissue Integrity  Goal: Skin integrity remains intact  Description: 1.  Monitor for areas of redness and/or skin breakdown2.  Assess vascular access sites hourly3.  Every 4-6 hours minimum:  Change oxygen saturation probe site4.  Every 4-6 hours:  If on nasal continuous positive airway pressure, respiratory therapy assess nares and determine need for appliance change or resting period  Outcome: Progressing     Problem: ABCDS Injury Assessment  Goal: Absence of physical injury  Outcome: Progressing     Problem: Discharge Planning  Goal: Discharge to home or other facility with appropriate resources  Outcome: Not Progressing

## 2025-05-26 NOTE — H&P
History & Physical - Critical Care      DATE: 25  NAME: Edna Doewll  : 1940  MRN: [id number]    Chief Complaint:   Chief Complaint   Patient presents with    Hypotension        History of Present Illness:  Edna Dowell is a 85 y.o. female  patient with past medical history of hypertension, CKD, history of venous thromboembolism, history of GI bleed, multiple myeloma, rheumatoid arthritis presented to the emergency room for evaluation of low blood pressure at her nursing home.  The staff reported systolic blood pressure in the 50s, despite the she was alert awake and oriented, no complaints of chest pain or difficulty breathing  No fever or chills, no nausea vomiting abdominal pain or diarrhea.  Has a Port-A-Cath.  No redness or swelling  In the ER the patient was hypotensive and tachycardic, started on IV fluids norepinephrine drip and vasopressin drip.  She received IV Zosyn and vancomycin.  Placed on nasal cannula oxygen 2 L  Critical care team consulted for evaluation and further management. Patient admitted to the ICU.     ROS:  Negative except as in HPI    Past Medical, Surgical, Social, Family History:  Past Medical History:   Diagnosis Date    Hypertension     Multiple myeloma (HCC)     Swelling     Thyroid disease      Past Surgical History:   Procedure Laterality Date    APPENDECTOMY      CHOLECYSTECTOMY      COLON SURGERY      HYSTERECTOMY (CERVIX STATUS UNKNOWN)      INVASIVE VASCULAR N/A 2024    Venogram lower ext bilat performed by Nazario Ha MD at [facility] CARDIAC CATH LAB    UPPER GASTROINTESTINAL ENDOSCOPY N/A 3/20/2025    ESOPHAGOGASTRODUODENOSCOPY BIOPSY performed by James Steven MD at [facility] ENDOSCOPY     Social History     Socioeconomic History    Marital status:      Spouse name: Not on file    Number of children: Not on file    Years of education: Not on file    Highest education level: Not on file   Occupational History    Not on file   Tobacco Use    Smoking

## 2025-05-26 NOTE — PROGRESS NOTES
PHARMACY VANCOMYCIN MONITORING SERVICE  Pharmacy consulted by Rajwinder Murray PA-C for monitoring and adjustment.    Indication for treatment: Vancomycin indication: Other: Septic shock  Goal trough: Trough Goal: 15-20 mcg/mL  AUC/EMILY: 400-600    Risk Factors for MRSA Identified:   Hospitalization within the past 90 days, Received IV antibiotics within the past 90 days    Pertinent Laboratory Values:   Temp Readings from Last 3 Encounters:   05/25/25 97.5 °F (36.4 °C) (Oral)   05/05/25 (!) 95.4 °F (35.2 °C) (Oral)   03/22/25 97.5 °F (36.4 °C) (Oral)     Recent Labs     05/25/25  1640   WBC 17.0*     Recent Labs     05/25/25  1640 05/25/25  2041   BUN 32* 31*   CREATININE 3.5* 3.3*     Estimated Creatinine Clearance: 12 mL/min (A) (based on SCr of 3.3 mg/dL (H)).    Intake/Output Summary (Last 24 hours) at 5/25/2025 2238  Last data filed at 5/25/2025 2046  Gross per 24 hour   Intake 1327.51 ml   Output --   Net 1327.51 ml     Last Encounter Weight:  Wt Readings from Last 3 Encounters:   05/25/25 77.1 kg (170 lb)   05/04/25 81.2 kg (179 lb 0.2 oz)   03/19/25 74.8 kg (164 lb 12.8 oz)       Pertinent Cultures:   Date    Source    Results  5/25   Blood    In process  5/25   MRSA by PCR  Ordered      Vancomycin level:   TROUGH:  No results for input(s): \"VANCOTROUGH\" in the last 72 hours.  RANDOM:  No results for input(s): \"VANCORANDOM\" in the last 72 hours.    Assessment:  HPI: 85-year-old female presented to the ED from a nursing home with hypotension. On arrival, her systolic blood pressure was in the 50s. She was recently hospitalized for sepsis due to Klebsiella UTI  SCr, BUN, and urine output: APARNA on CKD IIIa, Scr = 3.3, BUN = 31, Incomplete UOP data  Baseline Scr ~ 1.0  Day(s) of therapy: 1 of 5  Vancomycin concentration: TBD    Plan:  Loading dose of vancomycin administered: 2000 mg. Intermittent dosing of vancomycin will be used due to renal function.   Pharmacy will continue to monitor patient and adjust

## 2025-05-26 NOTE — PROGRESS NOTES
Inpatient Progress Note 5/26/2025        Edna Dowell  1940  [id number]      Assessment/Plan:  Edna Dowell is a 85 y.o. female  patient with past medical history of hypertension, CKD, history of venous thromboembolism, history of GI bleed, multiple myeloma, rheumatoid arthritis presented to the emergency room for evaluation of low blood pressure at her nursing home, found to be in septic shock, admitted to the ICU for further evaluation and care,      Problem list  Septic shock  APARNA on CKD  Metabolic acidosis  Hx of HTN  Hx of GIB  Hx of DVT  Hx of MM  Hx of RA    Neuro: alert, oriented and following commands. Pain control with multimodal regimen, adjust as needed  Cardio:  Hypotensive, septic shock requiring vasoactive agents, titrate to MAP > 65 mmHg. Echo pending for Evaluation of shock state and evaluation of possible vegetations given bacteremia  Resp: on 2L NC, Titrate of as able. Continue inhalers, nebs and aggressive pulm toileting.   GI: ADAT. GI ppx. Antiemetics as needed  : APARNA on CKD, cr 3.4 from 0.9. minimal urine output. Metabolic acidosis in the setting of Septic shock. May benefit from lasix challenge once clinically improving. Monitor electrolytes and replenish as needed  Heme:  Hgb 10, plts 211. DVT ppx: Heparin  ID: WBC 17. On broad spectrum antibiotics. F/u cultures and sensitivities and de-escalate as able. Blood cultures with Staph aureus with noted possible resistance gene.  May need to remove port if continues to have persistent bacteremia as a possible source,   Endo: POC BG ISS PRN. Maintain euglycemia, avoid hypoglycemia.   MSK: DTI prevention, wound care.     Tubes/Lines/Drains:  Port. PIV.     Code Status: DNR CCA      Subjective:    Patient was seen and evaluated at bedside, she is alert, oriented and following commands. Complains of some nausea but without any emesis.     Physical Exam:            BP 98/67   Pulse 90   Temp 98.5 °F (36.9 °C) (Oral)   Resp 23   Ht 1.626 m

## 2025-05-27 ENCOUNTER — APPOINTMENT (OUTPATIENT)
Dept: ULTRASOUND IMAGING | Age: 85
DRG: 314 | End: 2025-05-27
Payer: MEDICARE

## 2025-05-27 ENCOUNTER — ANESTHESIA EVENT (OUTPATIENT)
Dept: OPERATING ROOM | Age: 85
DRG: 314 | End: 2025-05-27
Payer: MEDICARE

## 2025-05-27 ENCOUNTER — APPOINTMENT (OUTPATIENT)
Dept: NON INVASIVE DIAGNOSTICS | Age: 85
DRG: 314 | End: 2025-05-27
Attending: STUDENT IN AN ORGANIZED HEALTH CARE EDUCATION/TRAINING PROGRAM
Payer: MEDICARE

## 2025-05-27 ENCOUNTER — ANESTHESIA (OUTPATIENT)
Dept: OPERATING ROOM | Age: 85
DRG: 314 | End: 2025-05-27
Payer: MEDICARE

## 2025-05-27 LAB
ALBUMIN SERPL-MCNC: 2.4 G/DL (ref 3.4–5)
ALBUMIN/GLOB SERPL: 1.3 {RATIO} (ref 1.1–2.2)
ALP SERPL-CCNC: 270 U/L (ref 40–129)
ALT SERPL-CCNC: 18 U/L (ref 10–40)
ANION GAP SERPL CALCULATED.3IONS-SCNC: 14 MMOL/L (ref 9–17)
ANION GAP SERPL CALCULATED.3IONS-SCNC: 16 MMOL/L (ref 9–17)
ANION GAP SERPL CALCULATED.3IONS-SCNC: 16 MMOL/L (ref 9–17)
ANION GAP SERPL CALCULATED.3IONS-SCNC: 19 MMOL/L (ref 9–17)
ANTI-XA UNFRAC HEPARIN: 0.92 IU/L
ANTI-XA UNFRAC HEPARIN: <0.1 IU/L
AST SERPL-CCNC: 37 U/L (ref 15–37)
BASOPHILS # BLD: 0.06 K/UL
BASOPHILS NFR BLD: 0 % (ref 0–1)
BILIRUB DIRECT SERPL-MCNC: 0.2 MG/DL (ref 0–0.3)
BILIRUB INDIRECT SERPL-MCNC: 0.1 MG/DL (ref 0–0.7)
BILIRUB SERPL-MCNC: 0.3 MG/DL (ref 0–1)
BUN SERPL-MCNC: 37 MG/DL (ref 7–20)
BUN SERPL-MCNC: 37 MG/DL (ref 7–20)
BUN SERPL-MCNC: 39 MG/DL (ref 7–20)
BUN SERPL-MCNC: 39 MG/DL (ref 7–20)
CALCIUM SERPL-MCNC: 7.9 MG/DL (ref 8.3–10.6)
CALCIUM SERPL-MCNC: 8 MG/DL (ref 8.3–10.6)
CALCIUM SERPL-MCNC: 8.1 MG/DL (ref 8.3–10.6)
CALCIUM SERPL-MCNC: 8.2 MG/DL (ref 8.3–10.6)
CHLORIDE SERPL-SCNC: 97 MMOL/L (ref 99–110)
CHLORIDE SERPL-SCNC: 97 MMOL/L (ref 99–110)
CHLORIDE SERPL-SCNC: 98 MMOL/L (ref 99–110)
CHLORIDE SERPL-SCNC: 98 MMOL/L (ref 99–110)
CO2 SERPL-SCNC: 20 MMOL/L (ref 21–32)
CO2 SERPL-SCNC: 22 MMOL/L (ref 21–32)
CO2 SERPL-SCNC: 22 MMOL/L (ref 21–32)
CO2 SERPL-SCNC: 23 MMOL/L (ref 21–32)
CREAT SERPL-MCNC: 3.4 MG/DL (ref 0.6–1.2)
CREAT SERPL-MCNC: 3.5 MG/DL (ref 0.6–1.2)
DATE LAST DOSE: NORMAL
EOSINOPHIL # BLD: 0.01 K/UL
EOSINOPHILS RELATIVE PERCENT: 0 % (ref 0–3)
ERYTHROCYTE [DISTWIDTH] IN BLOOD BY AUTOMATED COUNT: 17.2 % (ref 11.7–14.9)
ERYTHROCYTE [DISTWIDTH] IN BLOOD BY AUTOMATED COUNT: 17.3 % (ref 11.7–14.9)
ERYTHROCYTE [DISTWIDTH] IN BLOOD BY AUTOMATED COUNT: 19.5 % (ref 11.7–14.9)
GFR, ESTIMATED: 12 ML/MIN/1.73M2
GFR, ESTIMATED: 12 ML/MIN/1.73M2
GFR, ESTIMATED: 13 ML/MIN/1.73M2
GFR, ESTIMATED: 13 ML/MIN/1.73M2
GLUCOSE SERPL-MCNC: 76 MG/DL (ref 74–99)
GLUCOSE SERPL-MCNC: 83 MG/DL (ref 74–99)
GLUCOSE SERPL-MCNC: 92 MG/DL (ref 74–99)
GLUCOSE SERPL-MCNC: 97 MG/DL (ref 74–99)
HCT VFR BLD AUTO: 19.8 % (ref 37–47)
HCT VFR BLD AUTO: 24.5 % (ref 37–47)
HCT VFR BLD AUTO: 26.1 % (ref 37–47)
HGB BLD-MCNC: 6.5 G/DL (ref 12.5–16)
HGB BLD-MCNC: 8.2 G/DL (ref 12.5–16)
HGB BLD-MCNC: 8.6 G/DL (ref 12.5–16)
IMM GRANULOCYTES # BLD AUTO: 0.17 K/UL
IMM GRANULOCYTES NFR BLD: 1 %
INR PPP: 1.6
INR PPP: 1.7
LACTATE BLDV-SCNC: 1 MMOL/L (ref 0.4–2)
LACTATE BLDV-SCNC: 1.1 MMOL/L (ref 0.4–2)
LACTATE BLDV-SCNC: 1.2 MMOL/L (ref 0.4–2)
LACTATE BLDV-SCNC: 1.2 MMOL/L (ref 0.4–2)
LYMPHOCYTES NFR BLD: 1.66 K/UL
LYMPHOCYTES RELATIVE PERCENT: 8 % (ref 24–44)
MAGNESIUM SERPL-MCNC: 2.1 MG/DL (ref 1.8–2.4)
MAGNESIUM SERPL-MCNC: 2.2 MG/DL (ref 1.8–2.4)
MCH RBC QN AUTO: 30.4 PG (ref 27–31)
MCH RBC QN AUTO: 30.4 PG (ref 27–31)
MCH RBC QN AUTO: 30.6 PG (ref 27–31)
MCHC RBC AUTO-ENTMCNC: 32.8 G/DL (ref 32–36)
MCHC RBC AUTO-ENTMCNC: 33 G/DL (ref 32–36)
MCHC RBC AUTO-ENTMCNC: 33.5 G/DL (ref 32–36)
MCV RBC AUTO: 90.7 FL (ref 78–100)
MCV RBC AUTO: 92.5 FL (ref 78–100)
MCV RBC AUTO: 92.9 FL (ref 78–100)
MONOCYTES NFR BLD: 0.82 K/UL
MONOCYTES NFR BLD: 4 % (ref 0–5)
NEUTROPHILS NFR BLD: 87 % (ref 36–66)
NEUTS SEG NFR BLD: 17.49 K/UL
PARTIAL THROMBOPLASTIN TIME: 69.1 SEC (ref 25.1–37.1)
PHOSPHATE SERPL-MCNC: 3.9 MG/DL (ref 2.5–4.9)
PHOSPHATE SERPL-MCNC: 4.1 MG/DL (ref 2.5–4.9)
PHOSPHATE SERPL-MCNC: 4.3 MG/DL (ref 2.5–4.9)
PHOSPHATE SERPL-MCNC: 4.4 MG/DL (ref 2.5–4.9)
PLATELET # BLD AUTO: 109 K/UL (ref 140–440)
PLATELET # BLD AUTO: 112 K/UL (ref 140–440)
PLATELET, FLUORESCENCE: 128 K/UL (ref 140–440)
PMV BLD AUTO: 11.4 FL (ref 7.5–11.1)
PMV BLD AUTO: 11.8 FL (ref 7.5–11.1)
PMV BLD AUTO: 12.3 FL (ref 7.5–11.1)
POTASSIUM SERPL-SCNC: 3.3 MMOL/L (ref 3.5–5.1)
POTASSIUM SERPL-SCNC: 3.3 MMOL/L (ref 3.5–5.1)
POTASSIUM SERPL-SCNC: 3.5 MMOL/L (ref 3.5–5.1)
POTASSIUM SERPL-SCNC: 3.6 MMOL/L (ref 3.5–5.1)
PROT SERPL-MCNC: 4.2 G/DL (ref 6.4–8.2)
PROTHROMBIN TIME: 19.2 SEC (ref 11.7–14.5)
PROTHROMBIN TIME: 19.9 SEC (ref 11.7–14.5)
RBC # BLD AUTO: 2.14 M/UL (ref 4.2–5.4)
RBC # BLD AUTO: 2.7 M/UL (ref 4.2–5.4)
RBC # BLD AUTO: 2.81 M/UL (ref 4.2–5.4)
SODIUM SERPL-SCNC: 134 MMOL/L (ref 136–145)
SODIUM SERPL-SCNC: 135 MMOL/L (ref 136–145)
SODIUM SERPL-SCNC: 136 MMOL/L (ref 136–145)
SODIUM SERPL-SCNC: 137 MMOL/L (ref 136–145)
TME LAST DOSE: NORMAL H
VANCOMYCIN DOSE: NORMAL MG
VANCOMYCIN SERPL-MCNC: 17 UG/ML (ref 10–20)
WBC OTHER # BLD: 19.3 K/UL (ref 4–10.5)
WBC OTHER # BLD: 20.2 K/UL (ref 4–10.5)
WBC OTHER # BLD: 20.7 K/UL (ref 4–10.5)

## 2025-05-27 PROCEDURE — 3700000001 HC ADD 15 MINUTES (ANESTHESIA): Performed by: SURGERY

## 2025-05-27 PROCEDURE — P9047 ALBUMIN (HUMAN), 25%, 50ML: HCPCS | Performed by: STUDENT IN AN ORGANIZED HEALTH CARE EDUCATION/TRAINING PROGRAM

## 2025-05-27 PROCEDURE — 86923 COMPATIBILITY TEST ELECTRIC: CPT

## 2025-05-27 PROCEDURE — 2709999900 HC NON-CHARGEABLE SUPPLY: Performed by: SURGERY

## 2025-05-27 PROCEDURE — 83605 ASSAY OF LACTIC ACID: CPT

## 2025-05-27 PROCEDURE — 2700000000 HC OXYGEN THERAPY PER DAY

## 2025-05-27 PROCEDURE — 84100 ASSAY OF PHOSPHORUS: CPT

## 2025-05-27 PROCEDURE — 93321 DOPPLER ECHO F-UP/LMTD STD: CPT

## 2025-05-27 PROCEDURE — 2000000000 HC ICU R&B

## 2025-05-27 PROCEDURE — 2580000003 HC RX 258: Performed by: INTERNAL MEDICINE

## 2025-05-27 PROCEDURE — 85520 HEPARIN ASSAY: CPT

## 2025-05-27 PROCEDURE — 6370000000 HC RX 637 (ALT 250 FOR IP): Performed by: SURGERY

## 2025-05-27 PROCEDURE — 82248 BILIRUBIN DIRECT: CPT

## 2025-05-27 PROCEDURE — 6360000002 HC RX W HCPCS: Performed by: STUDENT IN AN ORGANIZED HEALTH CARE EDUCATION/TRAINING PROGRAM

## 2025-05-27 PROCEDURE — 36590 REMOVAL TUNNELED CV CATH: CPT | Performed by: SURGERY

## 2025-05-27 PROCEDURE — 85610 PROTHROMBIN TIME: CPT

## 2025-05-27 PROCEDURE — 86900 BLOOD TYPING SEROLOGIC ABO: CPT

## 2025-05-27 PROCEDURE — 86901 BLOOD TYPING SEROLOGIC RH(D): CPT

## 2025-05-27 PROCEDURE — 0JPT3WZ REMOVAL OF TOTALLY IMPLANTABLE VASCULAR ACCESS DEVICE FROM TRUNK SUBCUTANEOUS TISSUE AND FASCIA, PERCUTANEOUS APPROACH: ICD-10-PCS | Performed by: SURGERY

## 2025-05-27 PROCEDURE — 94761 N-INVAS EAR/PLS OXIMETRY MLT: CPT

## 2025-05-27 PROCEDURE — 87071 CULTURE AEROBIC QUANT OTHER: CPT

## 2025-05-27 PROCEDURE — 2500000003 HC RX 250 WO HCPCS: Performed by: PHYSICIAN ASSISTANT

## 2025-05-27 PROCEDURE — 2500000003 HC RX 250 WO HCPCS: Performed by: SURGERY

## 2025-05-27 PROCEDURE — 93971 EXTREMITY STUDY: CPT

## 2025-05-27 PROCEDURE — 7100000000 HC PACU RECOVERY - FIRST 15 MIN

## 2025-05-27 PROCEDURE — 80053 COMPREHEN METABOLIC PANEL: CPT

## 2025-05-27 PROCEDURE — 85025 COMPLETE CBC W/AUTO DIFF WBC: CPT

## 2025-05-27 PROCEDURE — 2580000003 HC RX 258: Performed by: SURGERY

## 2025-05-27 PROCEDURE — 6360000002 HC RX W HCPCS: Performed by: SURGERY

## 2025-05-27 PROCEDURE — 85027 COMPLETE CBC AUTOMATED: CPT

## 2025-05-27 PROCEDURE — 6360000002 HC RX W HCPCS: Performed by: PHYSICIAN ASSISTANT

## 2025-05-27 PROCEDURE — 86850 RBC ANTIBODY SCREEN: CPT

## 2025-05-27 PROCEDURE — 83735 ASSAY OF MAGNESIUM: CPT

## 2025-05-27 PROCEDURE — P9047 ALBUMIN (HUMAN), 25%, 50ML: HCPCS | Performed by: SURGERY

## 2025-05-27 PROCEDURE — 37799 UNLISTED PX VASCULAR SURGERY: CPT

## 2025-05-27 PROCEDURE — 3600000002 HC SURGERY LEVEL 2 BASE: Performed by: SURGERY

## 2025-05-27 PROCEDURE — 30233N1 TRANSFUSION OF NONAUTOLOGOUS RED BLOOD CELLS INTO PERIPHERAL VEIN, PERCUTANEOUS APPROACH: ICD-10-PCS | Performed by: SURGERY

## 2025-05-27 PROCEDURE — 80048 BASIC METABOLIC PNL TOTAL CA: CPT

## 2025-05-27 PROCEDURE — 6370000000 HC RX 637 (ALT 250 FOR IP): Performed by: INTERNAL MEDICINE

## 2025-05-27 PROCEDURE — 2580000003 HC RX 258: Performed by: ANESTHESIOLOGY

## 2025-05-27 PROCEDURE — 6360000002 HC RX W HCPCS: Performed by: INTERNAL MEDICINE

## 2025-05-27 PROCEDURE — 2580000003 HC RX 258: Performed by: PHYSICIAN ASSISTANT

## 2025-05-27 PROCEDURE — P9016 RBC LEUKOCYTES REDUCED: HCPCS

## 2025-05-27 PROCEDURE — 85730 THROMBOPLASTIN TIME PARTIAL: CPT

## 2025-05-27 PROCEDURE — 6360000002 HC RX W HCPCS: Performed by: NURSE ANESTHETIST, CERTIFIED REGISTERED

## 2025-05-27 PROCEDURE — 2500000003 HC RX 250 WO HCPCS: Performed by: ANESTHESIOLOGY

## 2025-05-27 PROCEDURE — 80202 ASSAY OF VANCOMYCIN: CPT

## 2025-05-27 PROCEDURE — 3600000012 HC SURGERY LEVEL 2 ADDTL 15MIN: Performed by: SURGERY

## 2025-05-27 PROCEDURE — 3700000000 HC ANESTHESIA ATTENDED CARE: Performed by: SURGERY

## 2025-05-27 RX ORDER — LORAZEPAM 2 MG/ML
0.5 INJECTION INTRAMUSCULAR
Status: DISCONTINUED | OUTPATIENT
Start: 2025-05-27 | End: 2025-05-27

## 2025-05-27 RX ORDER — SODIUM CHLORIDE, SODIUM LACTATE, POTASSIUM CHLORIDE, CALCIUM CHLORIDE 600; 310; 30; 20 MG/100ML; MG/100ML; MG/100ML; MG/100ML
INJECTION, SOLUTION INTRAVENOUS CONTINUOUS
Status: DISCONTINUED | OUTPATIENT
Start: 2025-05-27 | End: 2025-05-27

## 2025-05-27 RX ORDER — HEPARIN SODIUM 1000 [USP'U]/ML
80 INJECTION, SOLUTION INTRAVENOUS; SUBCUTANEOUS PRN
Status: DISCONTINUED | OUTPATIENT
Start: 2025-05-27 | End: 2025-05-29

## 2025-05-27 RX ORDER — OXYCODONE HYDROCHLORIDE 5 MG/1
5 TABLET ORAL
Status: DISCONTINUED | OUTPATIENT
Start: 2025-05-27 | End: 2025-05-27

## 2025-05-27 RX ORDER — LABETALOL HYDROCHLORIDE 5 MG/ML
10 INJECTION, SOLUTION INTRAVENOUS
Status: DISCONTINUED | OUTPATIENT
Start: 2025-05-27 | End: 2025-05-27

## 2025-05-27 RX ORDER — SODIUM CHLORIDE 0.9 % (FLUSH) 0.9 %
5-40 SYRINGE (ML) INJECTION PRN
Status: DISCONTINUED | OUTPATIENT
Start: 2025-05-27 | End: 2025-05-27

## 2025-05-27 RX ORDER — HEPARIN SODIUM 10000 [USP'U]/100ML
5-30 INJECTION, SOLUTION INTRAVENOUS CONTINUOUS
Status: DISCONTINUED | OUTPATIENT
Start: 2025-05-27 | End: 2025-05-29

## 2025-05-27 RX ORDER — HEPARIN SODIUM 1000 [USP'U]/ML
80 INJECTION, SOLUTION INTRAVENOUS; SUBCUTANEOUS ONCE
Status: COMPLETED | OUTPATIENT
Start: 2025-05-27 | End: 2025-05-27

## 2025-05-27 RX ORDER — LIDOCAINE HYDROCHLORIDE 10 MG/ML
INJECTION, SOLUTION INFILTRATION; PERINEURAL
Status: DISCONTINUED | OUTPATIENT
Start: 2025-05-27 | End: 2025-05-27 | Stop reason: ALTCHOICE

## 2025-05-27 RX ORDER — POTASSIUM CHLORIDE 29.8 MG/ML
20 INJECTION INTRAVENOUS
Status: COMPLETED | OUTPATIENT
Start: 2025-05-27 | End: 2025-05-27

## 2025-05-27 RX ORDER — METOCLOPRAMIDE HYDROCHLORIDE 5 MG/ML
10 INJECTION INTRAMUSCULAR; INTRAVENOUS
Status: DISCONTINUED | OUTPATIENT
Start: 2025-05-27 | End: 2025-05-27

## 2025-05-27 RX ORDER — ACETAMINOPHEN 325 MG/1
650 TABLET ORAL
Status: DISCONTINUED | OUTPATIENT
Start: 2025-05-27 | End: 2025-05-27

## 2025-05-27 RX ORDER — POTASSIUM CHLORIDE 1500 MG/1
40 TABLET, EXTENDED RELEASE ORAL PRN
Status: DISCONTINUED | OUTPATIENT
Start: 2025-05-27 | End: 2025-05-27

## 2025-05-27 RX ORDER — SODIUM CHLORIDE 9 MG/ML
INJECTION, SOLUTION INTRAVENOUS PRN
Status: DISCONTINUED | OUTPATIENT
Start: 2025-05-27 | End: 2025-05-27

## 2025-05-27 RX ORDER — PROPOFOL 10 MG/ML
INJECTION, EMULSION INTRAVENOUS
Status: DISCONTINUED | OUTPATIENT
Start: 2025-05-27 | End: 2025-05-28 | Stop reason: SDUPTHER

## 2025-05-27 RX ORDER — POTASSIUM CHLORIDE 7.45 MG/ML
10 INJECTION INTRAVENOUS PRN
Status: DISCONTINUED | OUTPATIENT
Start: 2025-05-27 | End: 2025-05-27

## 2025-05-27 RX ORDER — CLINDAMYCIN PHOSPHATE 600 MG/50ML
600 INJECTION, SOLUTION INTRAVENOUS
Status: DISCONTINUED | OUTPATIENT
Start: 2025-05-27 | End: 2025-05-27

## 2025-05-27 RX ORDER — POTASSIUM CHLORIDE 1500 MG/1
20 TABLET, EXTENDED RELEASE ORAL ONCE
Status: DISCONTINUED | OUTPATIENT
Start: 2025-05-27 | End: 2025-05-27

## 2025-05-27 RX ORDER — FUROSEMIDE 10 MG/ML
120 INJECTION INTRAMUSCULAR; INTRAVENOUS ONCE
Status: COMPLETED | OUTPATIENT
Start: 2025-05-27 | End: 2025-05-27

## 2025-05-27 RX ORDER — DIPHENHYDRAMINE HYDROCHLORIDE 50 MG/ML
12.5 INJECTION, SOLUTION INTRAMUSCULAR; INTRAVENOUS
Status: DISCONTINUED | OUTPATIENT
Start: 2025-05-27 | End: 2025-05-27

## 2025-05-27 RX ORDER — ONDANSETRON 2 MG/ML
4 INJECTION INTRAMUSCULAR; INTRAVENOUS
Status: DISCONTINUED | OUTPATIENT
Start: 2025-05-27 | End: 2025-05-27

## 2025-05-27 RX ORDER — SODIUM CHLORIDE 9 MG/ML
INJECTION, SOLUTION INTRAVENOUS PRN
Status: COMPLETED | OUTPATIENT
Start: 2025-05-27 | End: 2025-05-27

## 2025-05-27 RX ORDER — SODIUM CHLORIDE 0.9 % (FLUSH) 0.9 %
5-40 SYRINGE (ML) INJECTION EVERY 12 HOURS SCHEDULED
Status: DISCONTINUED | OUTPATIENT
Start: 2025-05-27 | End: 2025-05-29 | Stop reason: HOSPADM

## 2025-05-27 RX ORDER — NALOXONE HYDROCHLORIDE 0.4 MG/ML
INJECTION, SOLUTION INTRAMUSCULAR; INTRAVENOUS; SUBCUTANEOUS PRN
Status: DISCONTINUED | OUTPATIENT
Start: 2025-05-27 | End: 2025-05-27

## 2025-05-27 RX ORDER — HEPARIN SODIUM 1000 [USP'U]/ML
40 INJECTION, SOLUTION INTRAVENOUS; SUBCUTANEOUS PRN
Status: DISCONTINUED | OUTPATIENT
Start: 2025-05-27 | End: 2025-05-29

## 2025-05-27 RX ADMIN — HEPARIN SODIUM 18 UNITS/KG/HR: 10000 INJECTION, SOLUTION INTRAVENOUS at 15:29

## 2025-05-27 RX ADMIN — SODIUM CHLORIDE: 9 INJECTION, SOLUTION INTRAVENOUS at 08:57

## 2025-05-27 RX ADMIN — MUPIROCIN: 20 OINTMENT TOPICAL at 08:08

## 2025-05-27 RX ADMIN — Medication 100 MG: at 10:11

## 2025-05-27 RX ADMIN — VANCOMYCIN HYDROCHLORIDE 1000 MG: 1 INJECTION, POWDER, LYOPHILIZED, FOR SOLUTION INTRAVENOUS at 15:31

## 2025-05-27 RX ADMIN — SUCRALFATE 1 G: 1 TABLET ORAL at 10:00

## 2025-05-27 RX ADMIN — ALBUMIN (HUMAN) 12.5 G: 0.25 INJECTION, SOLUTION INTRAVENOUS at 04:37

## 2025-05-27 RX ADMIN — FOLIC ACID 1 MG: 1 TABLET ORAL at 10:00

## 2025-05-27 RX ADMIN — SODIUM CHLORIDE, SODIUM LACTATE, POTASSIUM CHLORIDE, AND CALCIUM CHLORIDE: .6; .31; .03; .02 INJECTION, SOLUTION INTRAVENOUS at 10:08

## 2025-05-27 RX ADMIN — HYDROCORTISONE SODIUM SUCCINATE 100 MG: 100 INJECTION, POWDER, FOR SOLUTION INTRAMUSCULAR; INTRAVENOUS at 10:11

## 2025-05-27 RX ADMIN — HEPARIN SODIUM 6400 UNITS: 1000 INJECTION INTRAVENOUS; SUBCUTANEOUS at 15:23

## 2025-05-27 RX ADMIN — CEFEPIME 1000 MG: 1 INJECTION, POWDER, FOR SOLUTION INTRAMUSCULAR; INTRAVENOUS at 17:08

## 2025-05-27 RX ADMIN — ALBUMIN (HUMAN) 12.5 G: 0.25 INJECTION, SOLUTION INTRAVENOUS at 10:06

## 2025-05-27 RX ADMIN — SODIUM CHLORIDE, PRESERVATIVE FREE 10 ML: 5 INJECTION INTRAVENOUS at 09:56

## 2025-05-27 RX ADMIN — CEFEPIME 1000 MG: 1 INJECTION, POWDER, FOR SOLUTION INTRAMUSCULAR; INTRAVENOUS at 04:34

## 2025-05-27 RX ADMIN — ZINC SULFATE 220 MG (50 MG) CAPSULE 50 MG: CAPSULE at 10:00

## 2025-05-27 RX ADMIN — PROPOFOL 20 MG: 10 INJECTION, EMULSION INTRAVENOUS at 09:18

## 2025-05-27 RX ADMIN — ALBUMIN (HUMAN) 12.5 G: 0.25 INJECTION, SOLUTION INTRAVENOUS at 21:04

## 2025-05-27 RX ADMIN — PANTOPRAZOLE SODIUM 40 MG: 40 INJECTION, POWDER, FOR SOLUTION INTRAVENOUS at 08:08

## 2025-05-27 RX ADMIN — SODIUM CHLORIDE, PRESERVATIVE FREE 10 ML: 5 INJECTION INTRAVENOUS at 21:08

## 2025-05-27 RX ADMIN — SODIUM CHLORIDE, PRESERVATIVE FREE 10 ML: 5 INJECTION INTRAVENOUS at 08:07

## 2025-05-27 RX ADMIN — PROPOFOL 20 MG: 10 INJECTION, EMULSION INTRAVENOUS at 09:26

## 2025-05-27 RX ADMIN — FUROSEMIDE 120 MG: 10 INJECTION, SOLUTION INTRAMUSCULAR; INTRAVENOUS at 11:32

## 2025-05-27 RX ADMIN — POTASSIUM CHLORIDE 20 MEQ: 29.8 INJECTION INTRAVENOUS at 17:10

## 2025-05-27 RX ADMIN — HYDROCORTISONE SODIUM SUCCINATE 100 MG: 100 INJECTION, POWDER, FOR SOLUTION INTRAMUSCULAR; INTRAVENOUS at 02:29

## 2025-05-27 RX ADMIN — CLINDAMYCIN PHOSPHATE 600 MG: 600 INJECTION, SOLUTION INTRAVENOUS at 08:35

## 2025-05-27 RX ADMIN — Medication 1 TABLET: at 10:00

## 2025-05-27 RX ADMIN — MUPIROCIN: 20 OINTMENT TOPICAL at 21:08

## 2025-05-27 RX ADMIN — CHLORHEXIDINE GLUCONATE 118 ML: 4 SOLUTION TOPICAL at 10:32

## 2025-05-27 RX ADMIN — PROPOFOL 20 MG: 10 INJECTION, EMULSION INTRAVENOUS at 09:11

## 2025-05-27 RX ADMIN — LEVOTHYROXINE SODIUM 150 MCG: 0.15 TABLET ORAL at 06:33

## 2025-05-27 RX ADMIN — SUCRALFATE 1 G: 1 TABLET ORAL at 06:33

## 2025-05-27 RX ADMIN — ALBUMIN (HUMAN) 12.5 G: 0.25 INJECTION, SOLUTION INTRAVENOUS at 17:14

## 2025-05-27 ASSESSMENT — PAIN SCALES - GENERAL
PAINLEVEL_OUTOF10: 0

## 2025-05-27 NOTE — ANESTHESIA PRE PROCEDURE
Department of Anesthesiology  Preprocedure Note       Name:  Edna Dowell   Age:  85 y.o.  :  1940                                          MRN:  [id number]         Date:  2025      Surgeon: Surgeon(s):  Rai Ralph MD    Procedure: Procedure(s):  PORT REMOVAL    Medications prior to admission:   Prior to Admission medications    Medication Sig Start Date End Date Taking? Authorizing Provider   calcium carbonate (TUMS) 500 MG chewable tablet Take 1 tablet by mouth in the morning and at bedtime 25  Raj Miranda, APRN - CNP   metoprolol tartrate (LOPRESSOR) 50 MG tablet Take 1 tablet by mouth 2 times daily    ProviderBryant MD   levothyroxine (SYNTHROID) 150 MCG tablet Take 1 tablet by mouth every morning (before breakfast) 3/23/25   Juan J Frank MD   sucralfate (CARAFATE) 1 GM tablet Take 1 tablet by mouth 4 times daily (before meals and nightly) 3/22/25   Juan J Frank MD   pantoprazole (PROTONIX) 40 MG tablet Take 1 tablet by mouth in the morning and at bedtime 3/22/25   Juan J Frank MD   calcium carbonate (OSCAL) 500 MG TABS tablet Take 1 tablet by mouth daily    ProviderBryant MD   Compression Stockings MISC by Does not apply route  Patient not taking: Reported on 24   Nazario Ha MD   acetaminophen (TYLENOL) 500 MG tablet Take 2 tablets by mouth every 6 hours as needed    Provider, MD Bryant       Current medications:    Current Facility-Administered Medications   Medication Dose Route Frequency Provider Last Rate Last Admin   • 0.9 % sodium chloride infusion   IntraVENous PRN Veronica Peralta MD       • clindamycin (CLEOCIN) 600 mg in dextrose 5% 50 mL IVPB  600 mg IntraVENous Once Cele Lino  mL/hr at 25 0835 600 mg at 25 0835   • hydrocortisone sodium succinate PF (SOLU-CORTEF) injection 100 mg  100 mg IntraVENous Q8H Cele Lino MD   100 mg at 25 0229   • albumin human 25% IV 
Rajwinder Murray PA-C        albuterol (PROVENTIL) (2.5 MG/3ML) 0.083% nebulizer solution 2.5 mg  2.5 mg Nebulization Q2H PRN Rajwinder Murray PA-C        pantoprazole (PROTONIX) injection 40 mg  40 mg IntraVENous Daily Rajwinder Murray PA-C   40 mg at 05/27/25 0808    VASOpressin 20 Units in sodium chloride 0.9 % 100 mL IVPB ([id number])  0.01-0.03 Units/min IntraVENous Continuous Rajwinder Murray PA-C   Stopped at 05/26/25 1447    cefepime (MAXIPIME) 1,000 mg in sodium chloride 0.9 % 50 mL IVPB (addEASE)  1,000 mg IntraVENous Q12H Veronica Peralta MD   Stopped at 05/27/25 0730    levothyroxine (SYNTHROID) tablet 150 mcg  150 mcg Oral QAM AC Veronica Peralta MD   150 mcg at 05/27/25 0633    sucralfate (CARAFATE) tablet 1 g  1 g Oral 4x Daily AC & HS Veronica Peralta MD   1 g at 05/27/25 0633    vancomycin (VANCOCIN) intermittent dosing (placeholder)   Other RX Placeholder Rajwinder Murray PA-C           Allergies:    Allergies   Allergen Reactions    Ampicillin Itching and Rash    Meperidine Other (See Comments)     Low blood pressure    Other reaction(s): Other - comment required   Low blood pressure    Meperidine Hcl Other (See Comments)     Other Reaction(s): Unknown    Other/Food Other (See Comments)     112.9KG///[id number]///DB<br/>Reaction(s): Unknown; Note: 112.9KG///[id number]///DB    Methotrexate Rash    Sulfa Antibiotics Hives, Other (See Comments) and Rash     HIVES,[id number]       Problem List:    Patient Active Problem List   Diagnosis Code    Leg DVT (deep venous thromboembolism), acute, left (HCC) I82.402    Thrombocytopenia, unspecified D69.6    History of DVT (deep vein thrombosis) Z86.718    Generalized weakness R53.1    Moderate malnutrition E44.0    GI bleed K92.2    Sepsis secondary to UTI ([facility]) A41.9, N39.0    Septic Shock/Bacteremia/APARNA R57.9    APARNA (acute kidney injury) N17.9       Past Medical History:        Diagnosis Date    Hypertension     Multiple myeloma (HCC)     Swelling     Thyroid

## 2025-05-27 NOTE — PROGRESS NOTES
MRN: [id number]  Name: Edna Dowell  : 1940    Insurance: Payor: MEDICARE /  /  /      Phone #: [phone]  Provider: SHANTAL Cross CNP     Date of Visit: 2025    Reason for visit: [facility] Sepsis secondary to UTI. Recent Hospitalization Date:    Reason for Hospitalization:    Last EK25  Type of Device:       Vitals BP HR O2% WT HT ORTHO BP LYING ORTHO BP SITTING ORTHO BP SITTING   Today's Findings           Patients work up- Check List     Testing Last Date Completed Date Expected  ([facility]) Additional Notes    MA to document For provider to complete Either MA or Provider    Carotid Duplex  STAT 1 WK 6 MTH       THIS WK 2 WK 1 YEAR     Cardiac CTA  STAT 1 WK 6 MTH       THIS WK 2 WK 1 YEAR     Cardiac CT Calcium scoring  STAT 1 WK 6 MTH       THIS WK 2 WK 1 YEAR     CTA Chest, Abdomen & Pelvis 3/19/25 STAT 1 WK 6 MTH       THIS WK 2 WK 1 YEAR     CT Chest IV w/ Contrast  STAT 1 WK 6 MTH       THIS WK 2 WK 1 YEAR     CT Chest w/o Contrast  STAT 1 WK 6 MTH       THIS WK 2 WK 1 YEAR     CXR 25 STAT 1 WK 6 MTH       THIS WK 2 WK 1 YEAR     ECHO  Stress Complete Limited     MRI- Cardiac  STAT 1 WK 6 MTH       THIS WK 2 WK 1 YEAR     MUGA Scan  STAT 1 WK 6 MTH       THIS WK 2 WK 1 YEAR     Nuclear Stress  Lexiscan Cardiolite     PFT  STAT 1 WK 6 MTH       THIS WK 2 WK 1 YEAR     Treadmill Stress Test  STAT 1 WK 6 MTH       THIS WK 2 WK 1 YEAR     Vascular Duplex 5/3/25 Lower: Right Left Bilat       Upper: Right Left Bilat     Other Test Not Listed:    Monitors Last Date Completed Day's Request/Ordered     Holter  Short term 24 hours 48 hours      Long term 3 days 7 days 14 days   Event   (1-30 days)      Procedures Last Date Performed Procedure Details Date Expected   Additional Notes    ASD Closure        Carotid Angio        Cardioversion        Heart Cath  R L R&L      Peripheral Angio  R L      PFO Closure        PTCA/PCI        TELMA        TELMA/Cardioversion        Venogram

## 2025-05-27 NOTE — PROGRESS NOTES
Inpatient Progress Note 5/27/2025        Edna Dowell  1940  [id number]      Assessment/Plan:  Edna Dowell is a 85 y.o. female  patient with past medical history of hypertension, CKD, history of venous thromboembolism, history of GI bleed, multiple myeloma, rheumatoid arthritis presented to the emergency room for evaluation of low blood pressure at her nursing home, found to be in septic shock, admitted to the ICU for further evaluation and care,        Problem list  Septic shock  APARNA on CKD  Metabolic acidosis  LE DVT  Hx of HTN  Hx of GIB  Hx of DVT  Hx of MM  Hx of RA     Neuro: alert, oriented and following commands. Pain control with multimodal regimen, adjust as needed  Cardio:  Hypotensive, septic shock requiring vasoactive agents, titrate to MAP > 65 mmHg. Low dose levophed. Echo pending for Evaluation of shock state and evaluation of possible vegetations given bacteremia  Resp: on 2L NC, Titrate of as able. Continue inhalers, nebs and aggressive pulm toileting.   GI: ADAT. GI ppx. Antiemetics as needed  : APARNA on CKD, cr 3.4 from 0.9.uop 225 ml overnight. Lasix challenge today with 120 mg dose. Monitor for output, if unresponsive may require HD.  Monitor electrolytes and replenish as needed  Heme:  Hgb 8.6 s/p 1U PRBC, no evidence of bleeding. Monitor and transfuse as needed, plts 112. DVT ppx: Heparin infusion for LE DVT  ID: WBC 20.7. On broad spectrum antibiotics. F/u cultures and sensitivities and de-escalate as able. Blood cultures with Staph aureus with noted possible resistance gene.  S/p removal of Port this morning.   Endo: POC BG ISS PRN. Maintain euglycemia, avoid hypoglycemia.   MSK: DTI prevention, wound care.      Tubes/Lines/Drains:  Nathaly, CVC, PIV     Code Status: DNR CCA         Subjective:    Patient was seen and evaluated at bedside. She is alert, oriented and following commands. No complaints this morning.  overnight.      Physical Exam:            BP (!) 98/43   Pulse

## 2025-05-27 NOTE — PROGRESS NOTES
PHARMACY VANCOMYCIN MONITORING SERVICE  Pharmacy consulted by Rajwinder Murray PA-C for monitoring and adjustment.    Indication for treatment: Vancomycin indication: Other: Septic shock  Goal trough: Trough Goal: 15-20 mcg/mL  AUC/EMILY: 400-600    Risk Factors for MRSA Identified:   Hospitalization within the past 90 days, Received IV antibiotics within the past 90 days    Pertinent Laboratory Values:   Temp Readings from Last 3 Encounters:   05/27/25 (!) 94.8 °F (34.9 °C) (Rectal)   05/05/25 (!) 95.4 °F (35.2 °C) (Oral)   03/22/25 97.5 °F (36.4 °C) (Oral)     Recent Labs     05/25/25  1640 05/27/25  0515 05/27/25  1026   WBC 17.0* 20.2* 20.7*     Recent Labs     05/26/25  2150 05/27/25  0240 05/27/25  1026   BUN 36* 37* 37*   CREATININE 3.4* 3.5* 3.4*     Estimated Creatinine Clearance: 12 mL/min (A) (based on SCr of 3.4 mg/dL (H)).    Intake/Output Summary (Last 24 hours) at 5/27/2025 1156  Last data filed at 5/27/2025 1000  Gross per 24 hour   Intake 1974.94 ml   Output 365 ml   Net 1609.94 ml     In: 2345.2   Out: 455 [Urine:455]    Last Encounter Weight:  Wt Readings from Last 3 Encounters:   05/27/25 79.8 kg (176 lb)   05/04/25 81.2 kg (179 lb 0.2 oz)   03/19/25 74.8 kg (164 lb 12.8 oz)       Pertinent Cultures:   Date    Source    Results  5/25   Blood    MRSA  5/25   MRSA by PCR  Positive  5/26                            Blood                                      GPC  5/26   Blood    ordered  5/27   CATH tip   ordered      VANCOMYCIN RANDOM:    Recent Labs     05/26/25  0500 05/27/25  1026   VANCORANDOM 19.1 17.0       Assessment:  HPI: 85-year-old female presented to the ED from a nursing home with hypotension. On arrival, her systolic blood pressure was in the 50s. She was recently hospitalized for sepsis due to Klebsiella UTI  5/26 - Noted MRSA in the blood  SCr, BUN, and urine output:   APARNA on CKD IIIa, SCR very elevated @3.4 (upward trend) (baseline ~1)  BUN elevated @37, upward trend  455 mLin last 24

## 2025-05-27 NOTE — PROGRESS NOTES
Nephrology Progress Note  5/27/2025 6:43 AM        Subjective:   Admit Date: 5/25/2025  PCP: Bob Abraham MD    Interval History: Hemodynamically better currently only with norepinephrine and the vasopressin has been stopped    Diet: She is nothing by mouth perhaps for port removal    ROS: She still alert awake without any overt shortness of breath or confusion, urine output recorded 4 and 10 cc for the last 24 hours, no fever    Data:     Current meds:    hydrocortisone sodium succinate PF  100 mg IntraVENous Q8H    albumin human 25%  12.5 g IntraVENous Q6H    folic acid  1 mg Oral Daily    multivitamin  1 tablet Oral Daily    zinc sulfate  50 mg Oral Daily    thiamine  100 mg Oral Daily    magnesium sulfate  4,000 mg IntraVENous Once    mupirocin   Each Nostril BID    And    chlorhexidine gluconate   Topical Daily    sodium chloride flush  5-40 mL IntraVENous 2 times per day    heparin (porcine)  5,000 Units SubCUTAneous 3 times per day    pantoprazole  40 mg IntraVENous Daily    cefepime  1,000 mg IntraVENous Q12H    levothyroxine  150 mcg Oral QAM AC    sucralfate  1 g Oral 4x Daily AC & HS    vancomycin (VANCOCIN) intermittent dosing (placeholder)   Other RX Placeholder      sodium chloride      norepinephrine 1 mcg/min (05/27/25 0641)    sodium chloride      VASOpressin 20 Units in sodium chloride 0.9 % 100 mL IVPB ([id number]) Stopped (05/26/25 1447)         I/O last 3 completed shifts:  In: 2351.3 [I.V.:649.7; IV Piggyback:1701.6]  Out: 140 [Urine:140]    CBC:   Recent Labs     05/25/25  1640 05/27/25  0515   WBC 17.0* 20.2*   HGB 10.0* 6.5*     --           Recent Labs     05/26/25  1558 05/26/25  2150 05/27/25  0240   * 134* 134*   K 3.8 3.5 3.5   CL 97* 98* 97*   CO2 22 22 23   BUN 35* 36* 37*   CREATININE 3.6* 3.4* 3.5*   GLUCOSE 103* 103* 97       Lab Results   Component Value Date    CALCIUM 8.0 (L) 05/27/2025    PHOS 4.4 05/27/2025       Objective:     Vitals: BP (!) 139/41   Pulse 92

## 2025-05-27 NOTE — CARE COORDINATION
Patient is from [facility]; discharged from [facility] 5/5/25  . Patient to the OR today for Mediport removal. Will discuss with patient and spouse - possible return to [facility] to complete Rehab. Melody Ham RN

## 2025-05-27 NOTE — OP NOTE
Operative Note      Patient: Edna Dowell  YOB: 1940  MRN: [id number]    Date of Procedure: 5/27/2025    Pre-Op Diagnosis Codes:      * Infection of venous access port, initial encounter [T80.219A]    Post-Op Diagnosis: Same       Procedure(s):  PORT REMOVAL    Surgeon(s):  Rai Ralph MD    Assistant:   * No surgical staff found *    Anesthesia: Monitor Anesthesia Care    Estimated Blood Loss (mL): 20cc    Complications: None    Specimens:   ID Type Source Tests Collected by Time Destination   1 : PORT CATHETER TIP Catheter Tip Foreign Body CULTURE, SURGICAL Rai Ralph MD 5/27/2025 0924        Implants:  * No implants in log *      Drains:   Urinary Catheter 05/26/25 Patel (Active)   Catheter Indications Need for fluid volume management of the critically ill patient in a critical care setting 05/27/25 0800   Site Assessment No urethral drainage 05/27/25 0800   Urine Color Yellow 05/27/25 0800   Urine Appearance Clear 05/27/25 0800   Urine Odor Other (Comment) 05/27/25 0800   Collection Container Standard 05/27/25 0800   Securement Method Securing device (Describe) 05/27/25 0800   Catheter Care  Soap and water 05/27/25 0815   Catheter Best Practices  Drainage tube clipped to bed;Catheter secured to thigh;Tamper seal intact;Bag below bladder;Lack of dependent loop in tubing;Bag not on floor;Drainage bag less than half full 05/27/25 0800   Status Draining 05/27/25 0800   Output (mL) 45 mL 05/27/25 0820       [REMOVED] External Urinary Catheter (Removed)   Site Assessment Clean,dry & intact 05/02/25 2219   Placement Replaced 05/02/25 2219   Securement Method Other (Comment) 05/02/25 2219   Catheter Care Catheter/Wick replaced 05/02/25 2219   Perineal Care Yes 05/02/25 2219   Suction 40 mmgHg continuous 05/02/25 2219   Urine Color Yellow 05/05/25 0523   Urine Appearance Clear 05/05/25 0523   Urine Odor Other (Comment) 05/02/25 2219   Output (mL) 400 mL 05/05/25 0523

## 2025-05-27 NOTE — CONSENT
Informed Consent for Blood Component Transfusion Note    I have discussed with the patient the rationale for blood component transfusion; its benefits in treating or preventing fatigue, organ damage, or death; and its risk which includes mild transfusion reactions, rare risk of blood borne infection, or more serious but rare reactions. I have discussed the alternatives to transfusion, including the risk and consequences of not receiving transfusion. The patient had an opportunity to ask questions and had agreed to proceed with transfusion of blood components.    Electronically signed by Rajwinder Murray PA-C on 5/27/25 at 1:21 PM EDT

## 2025-05-27 NOTE — PROGRESS NOTES
GENERAL SURGERY PROGRESS NOTE    Edna Dowell is a 85 y.o. female with MRSA bacteremia, concern for infected port.                 Subjective:  Doing ok this morning. Pressors down. Hgb lower--blood on its way. Questions answered regarding planned surgery today    Objective:    Vitals: VITALS:  BP (!) 124/43   Pulse 79   Temp 97.6 °F (36.4 °C) (Oral)   Resp 18   Ht 1.626 m (5' 4\")   Wt 80 kg (176 lb 5.9 oz)   SpO2 99%   BMI 30.27 kg/m²     I/O: 05/26 0701 - 05/27 0700  In: 1744.2 [I.V.:1474.1]  Out: 410 [Urine:410]    Labs/Imaging Results:   Lab Results   Component Value Date/Time     05/27/2025 02:40 AM    K 3.5 05/27/2025 02:40 AM    CL 97 05/27/2025 02:40 AM    CO2 23 05/27/2025 02:40 AM    BUN 37 05/27/2025 02:40 AM    CREATININE 3.5 05/27/2025 02:40 AM    GLUCOSE 97 05/27/2025 02:40 AM    CALCIUM 8.0 05/27/2025 02:40 AM      Lab Results   Component Value Date    WBC 20.2 (H) 05/27/2025    HGB 6.5 (LL) 05/27/2025    HCT 19.8 (L) 05/27/2025    MCV 92.5 05/27/2025     05/25/2025         IV Fluids:   sodium chloride    norepinephrine Last Rate: 1 mcg/min (05/27/25 0641)    sodium chloride    VASOpressin 20 Units in sodium chloride 0.9 % 100 mL IVPB ([id number]) Last Rate: Stopped (05/26/25 1447)    Scheduled Meds:   hydrocortisone sodium succinate PF, 100 mg, IntraVENous, Q8H    albumin human 25%, 12.5 g, IntraVENous, Q6H    folic acid, 1 mg, Oral, Daily    multivitamin, 1 tablet, Oral, Daily    zinc sulfate, 50 mg, Oral, Daily    thiamine, 100 mg, Oral, Daily    magnesium sulfate, 4,000 mg, IntraVENous, Once    mupirocin, , Each Nostril, BID **AND** chlorhexidine gluconate, , Topical, Daily    sodium chloride flush, 5-40 mL, IntraVENous, 2 times per day    heparin (porcine), 5,000 Units, SubCUTAneous, 3 times per day    pantoprazole, 40 mg, IntraVENous, Daily    cefepime, 1,000 mg, IntraVENous, Q12H    levothyroxine, 150 mcg, Oral, QAM AC    sucralfate, 1 g, Oral, 4x Daily AC & HS    vancomycin

## 2025-05-28 ENCOUNTER — APPOINTMENT (OUTPATIENT)
Dept: GENERAL RADIOLOGY | Age: 85
DRG: 314 | End: 2025-05-28
Payer: MEDICARE

## 2025-05-28 ENCOUNTER — APPOINTMENT (OUTPATIENT)
Dept: CT IMAGING | Age: 85
DRG: 314 | End: 2025-05-28
Payer: MEDICARE

## 2025-05-28 LAB
ABO/RH: NORMAL
ABSOLUTE BANDS: 3.44 K/UL
ACB COMPLEX DNA BLD POS QL NAA+NON-PROBE: NOT DETECTED
ANION GAP SERPL CALCULATED.3IONS-SCNC: 17 MMOL/L (ref 9–17)
ANION GAP SERPL CALCULATED.3IONS-SCNC: 19 MMOL/L (ref 9–17)
ANION GAP SERPL CALCULATED.3IONS-SCNC: 20 MMOL/L (ref 9–17)
ANTI-XA UNFRAC HEPARIN: 0.73 IU/L
ANTI-XA UNFRAC HEPARIN: 0.95 IU/L
ANTI-XA UNFRAC HEPARIN: >1.1 IU/L
ANTIBODY SCREEN: NEGATIVE
B FRAGILIS DNA BLD POS QL NAA+NON-PROBE: NOT DETECTED
BANDS: 18 %
BASOPHILS # BLD: 0 K/UL
BASOPHILS NFR BLD: 0 % (ref 0–1)
BLOOD BANK BLOOD PRODUCT EXPIRATION DATE: NORMAL
BLOOD BANK DISPENSE STATUS: NORMAL
BLOOD BANK ISBT PRODUCT BLOOD TYPE: 5100
BLOOD BANK PRODUCT CODE: NORMAL
BLOOD BANK SAMPLE EXPIRATION: NORMAL
BLOOD BANK UNIT TYPE AND RH: NORMAL
BPU ID: NORMAL
BUN SERPL-MCNC: 41 MG/DL (ref 7–20)
BUN SERPL-MCNC: 42 MG/DL (ref 7–20)
BUN SERPL-MCNC: 43 MG/DL (ref 7–20)
C ALBICANS DNA BLD POS QL NAA+NON-PROBE: NOT DETECTED
C AURIS DNA BLD POS QL NAA+NON-PROBE: NOT DETECTED
C GATTII+NEOFOR DNA BLD POS QL NAA+N-PRB: NOT DETECTED
C GLABRATA DNA BLD POS QL NAA+NON-PROBE: NOT DETECTED
C KRUSEI DNA BLD POS QL NAA+NON-PROBE: NOT DETECTED
C PARAP DNA BLD POS QL NAA+NON-PROBE: NOT DETECTED
C TROPICLS DNA BLD POS QL NAA+NON-PROBE: NOT DETECTED
CALCIUM SERPL-MCNC: 8.2 MG/DL (ref 8.3–10.6)
CALCIUM SERPL-MCNC: 8.3 MG/DL (ref 8.3–10.6)
CALCIUM SERPL-MCNC: 8.6 MG/DL (ref 8.3–10.6)
CHLORIDE SERPL-SCNC: 97 MMOL/L (ref 99–110)
CHLORIDE SERPL-SCNC: 97 MMOL/L (ref 99–110)
CHLORIDE SERPL-SCNC: 98 MMOL/L (ref 99–110)
CK SERPL-CCNC: 37 U/L (ref 26–192)
CO2 SERPL-SCNC: 18 MMOL/L (ref 21–32)
CO2 SERPL-SCNC: 19 MMOL/L (ref 21–32)
CO2 SERPL-SCNC: 19 MMOL/L (ref 21–32)
COMPONENT: NORMAL
CREAT SERPL-MCNC: 3.5 MG/DL (ref 0.6–1.2)
CREAT SERPL-MCNC: 3.5 MG/DL (ref 0.6–1.2)
CREAT SERPL-MCNC: 3.6 MG/DL (ref 0.6–1.2)
CROSSMATCH RESULT: NORMAL
DATE LAST DOSE: ABNORMAL
E CLOAC COMP DNA BLD POS NAA+NON-PROBE: NOT DETECTED
E COLI DNA BLD POS QL NAA+NON-PROBE: NOT DETECTED
E FAECALIS DNA BLD POS QL NAA+NON-PROBE: NOT DETECTED
E FAECIUM DNA BLD POS QL NAA+NON-PROBE: NOT DETECTED
ECHO BSA: 1.9 M2
ECHO EST RA PRESSURE: 3 MMHG
ECHO IVC PROX: 1.7 CM
ECHO LV EDV A4C: 33 ML
ECHO LV EDV INDEX A4C: 18 ML/M2
ECHO LV EF PHYSICIAN: 55 %
ECHO LV EJECTION FRACTION A4C: 73 %
ECHO LV ESV A4C: 9 ML
ECHO LV ESV INDEX A4C: 5 ML/M2
ECHO LV FRACTIONAL SHORTENING: 44 % (ref 28–44)
ECHO LV INTERNAL DIMENSION DIASTOLE INDEX: 2.7 CM/M2
ECHO LV INTERNAL DIMENSION DIASTOLIC: 5 CM (ref 3.9–5.3)
ECHO LV INTERNAL DIMENSION SYSTOLIC INDEX: 1.51 CM/M2
ECHO LV INTERNAL DIMENSION SYSTOLIC: 2.8 CM
ECHO LV IVSD: 0.7 CM (ref 0.6–0.9)
ECHO LV MASS 2D: 125.1 G (ref 67–162)
ECHO LV MASS INDEX 2D: 67.6 G/M2 (ref 43–95)
ECHO LV POSTERIOR WALL DIASTOLIC: 0.8 CM (ref 0.6–0.9)
ECHO LV RELATIVE WALL THICKNESS RATIO: 0.32
ECHO RIGHT VENTRICULAR SYSTOLIC PRESSURE (RVSP): 46 MMHG
ECHO RV MID DIMENSION: 3.2 CM
ECHO TV REGURGITANT MAX VELOCITY: 3.29 M/S
ECHO TV REGURGITANT PEAK GRADIENT: 43 MMHG
ENTEROBACTERALES DNA BLD POS NAA+N-PRB: NOT DETECTED
EOSINOPHIL # BLD: 0.19 K/UL
EOSINOPHILS RELATIVE PERCENT: 1 % (ref 0–3)
ERYTHROCYTE [DISTWIDTH] IN BLOOD BY AUTOMATED COUNT: 17.4 % (ref 11.7–14.9)
ERYTHROCYTE [DISTWIDTH] IN BLOOD BY AUTOMATED COUNT: 17.7 % (ref 11.7–14.9)
GFR, ESTIMATED: 12 ML/MIN/1.73M2
GLUCOSE BLD-MCNC: 102 MG/DL (ref 74–99)
GLUCOSE BLD-MCNC: 108 MG/DL (ref 74–99)
GLUCOSE SERPL-MCNC: 105 MG/DL (ref 74–99)
GLUCOSE SERPL-MCNC: 109 MG/DL (ref 74–99)
GLUCOSE SERPL-MCNC: 84 MG/DL (ref 74–99)
GP B STREP DNA BLD POS QL NAA+NON-PROBE: NOT DETECTED
HAEM INFLU DNA BLD POS QL NAA+NON-PROBE: NOT DETECTED
HCT VFR BLD AUTO: 24.4 % (ref 37–47)
HCT VFR BLD AUTO: 24.8 % (ref 37–47)
HGB BLD-MCNC: 8.3 G/DL (ref 12.5–16)
HGB BLD-MCNC: 8.5 G/DL (ref 12.5–16)
INR PPP: 1.7
K OXYTOCA DNA BLD POS QL NAA+NON-PROBE: NOT DETECTED
KLEBSIELLA SP DNA BLD POS QL NAA+NON-PRB: NOT DETECTED
KLEBSIELLA SP DNA BLD POS QL NAA+NON-PRB: NOT DETECTED
L MONOCYTOG DNA BLD POS QL NAA+NON-PROBE: NOT DETECTED
LACTATE BLDV-SCNC: 1 MMOL/L (ref 0.4–2)
LACTATE BLDV-SCNC: 1.1 MMOL/L (ref 0.4–2)
LACTATE BLDV-SCNC: 1.2 MMOL/L (ref 0.4–2)
LYMPHOCYTES NFR BLD: 2.1 K/UL
LYMPHOCYTES RELATIVE PERCENT: 11 % (ref 24–44)
MAGNESIUM SERPL-MCNC: 2.1 MG/DL (ref 1.8–2.4)
MAGNESIUM SERPL-MCNC: 2.1 MG/DL (ref 1.8–2.4)
MAGNESIUM SERPL-MCNC: 2.4 MG/DL (ref 1.8–2.4)
MCH RBC QN AUTO: 30.5 PG (ref 27–31)
MCH RBC QN AUTO: 30.6 PG (ref 27–31)
MCHC RBC AUTO-ENTMCNC: 34 G/DL (ref 32–36)
MCHC RBC AUTO-ENTMCNC: 34.3 G/DL (ref 32–36)
MCV RBC AUTO: 88.9 FL (ref 78–100)
MCV RBC AUTO: 90 FL (ref 78–100)
MECA+MECC+MREJ ISLT/SPM QL: DETECTED
METAMYELOCYTES ABSOLUTE COUNT: 0.19 K/UL
METAMYELOCYTES: 1 %
MICROORGANISM SPEC CULT: ABNORMAL
MICROORGANISM SPEC CULT: ABNORMAL
MICROORGANISM/AGENT SPEC: ABNORMAL
MICROORGANISM/AGENT SPEC: ABNORMAL
MONOCYTES NFR BLD: 0.96 K/UL
MONOCYTES NFR BLD: 5 % (ref 0–5)
MYELOCYTES ABSOLUTE COUNT: 0.19 K/UL
MYELOCYTES: 1 %
N MEN DNA BLD POS QL NAA+NON-PROBE: NOT DETECTED
NEUTROPHILS NFR BLD: 63 % (ref 36–66)
NEUTS SEG NFR BLD: 12.03 K/UL
NUCLEATED RED BLOOD CELLS: 1 PER 100 WBC
P AERUGINOSA DNA BLD POS NAA+NON-PROBE: NOT DETECTED
PHOSPHATE SERPL-MCNC: 3.9 MG/DL (ref 2.5–4.9)
PHOSPHATE SERPL-MCNC: 3.9 MG/DL (ref 2.5–4.9)
PHOSPHATE SERPL-MCNC: 4.1 MG/DL (ref 2.5–4.9)
PLATELET # BLD AUTO: 100 K/UL (ref 140–440)
PLATELET CONFIRMATION: NORMAL
PLATELET ESTIMATE: NORMAL
PLATELET, FLUORESCENCE: 97 K/UL (ref 140–440)
PMV BLD AUTO: 12.6 FL (ref 7.5–11.1)
PMV BLD AUTO: 12.6 FL (ref 7.5–11.1)
POTASSIUM SERPL-SCNC: 3.3 MMOL/L (ref 3.5–5.1)
POTASSIUM SERPL-SCNC: 3.4 MMOL/L (ref 3.5–5.1)
POTASSIUM SERPL-SCNC: 3.6 MMOL/L (ref 3.5–5.1)
PROTEUS SP DNA BLD POS QL NAA+NON-PROBE: NOT DETECTED
PROTHROMBIN TIME: 20.5 SEC (ref 11.7–14.5)
RBC # BLD AUTO: 2.71 M/UL (ref 4.2–5.4)
RBC # BLD AUTO: 2.79 M/UL (ref 4.2–5.4)
RBC # BLD: ABNORMAL 10*6/UL
RBC # BLD: NORMAL 10*6/UL
S AUREUS DNA BLD POS QL NAA+NON-PROBE: DETECTED
S AUREUS+CONS DNA BLD POS NAA+NON-PROBE: DETECTED
S EPIDERMIDIS DNA BLD POS QL NAA+NON-PRB: NOT DETECTED
S LUGDUNENSIS DNA BLD POS QL NAA+NON-PRB: NOT DETECTED
S MALTOPHILIA DNA BLD POS QL NAA+NON-PRB: NOT DETECTED
S MARCESCENS DNA BLD POS NAA+NON-PROBE: NOT DETECTED
S PNEUM DNA BLD POS QL NAA+NON-PROBE: NOT DETECTED
S PYO DNA BLD POS QL NAA+NON-PROBE: ABNORMAL
SALMONELLA DNA BLD POS QL NAA+NON-PROBE: NOT DETECTED
SERVICE CMNT-IMP: ABNORMAL
SERVICE CMNT-IMP: ABNORMAL
SODIUM SERPL-SCNC: 133 MMOL/L (ref 136–145)
SODIUM SERPL-SCNC: 134 MMOL/L (ref 136–145)
SODIUM SERPL-SCNC: 137 MMOL/L (ref 136–145)
SPECIMEN DESCRIPTION: ABNORMAL
SPECIMEN DESCRIPTION: ABNORMAL
STREPTOCOCCUS DNA BLD POS NAA+NON-PROBE: NOT DETECTED
TME LAST DOSE: ABNORMAL H
TRANSFUSION STATUS: NORMAL
UNIT DIVISION: 0
UNIT ISSUE DATE/TIME: NORMAL
VANCOMYCIN DOSE: ABNORMAL MG
VANCOMYCIN SERPL-MCNC: 23.6 UG/ML (ref 10–20)
WBC # BLD: ABNORMAL 10*3/UL
WBC # BLD: NORMAL 10*3/UL
WBC OTHER # BLD: 17.5 K/UL (ref 4–10.5)
WBC OTHER # BLD: 19.1 K/UL (ref 4–10.5)

## 2025-05-28 PROCEDURE — 6370000000 HC RX 637 (ALT 250 FOR IP): Performed by: SURGERY

## 2025-05-28 PROCEDURE — 74176 CT ABD & PELVIS W/O CONTRAST: CPT

## 2025-05-28 PROCEDURE — 87040 BLOOD CULTURE FOR BACTERIA: CPT

## 2025-05-28 PROCEDURE — 2700000000 HC OXYGEN THERAPY PER DAY

## 2025-05-28 PROCEDURE — 6360000002 HC RX W HCPCS: Performed by: INTERNAL MEDICINE

## 2025-05-28 PROCEDURE — 99024 POSTOP FOLLOW-UP VISIT: CPT | Performed by: SURGERY

## 2025-05-28 PROCEDURE — 93325 DOPPLER ECHO COLOR FLOW MAPG: CPT | Performed by: INTERNAL MEDICINE

## 2025-05-28 PROCEDURE — 93321 DOPPLER ECHO F-UP/LMTD STD: CPT | Performed by: INTERNAL MEDICINE

## 2025-05-28 PROCEDURE — 94761 N-INVAS EAR/PLS OXIMETRY MLT: CPT

## 2025-05-28 PROCEDURE — 97162 PT EVAL MOD COMPLEX 30 MIN: CPT

## 2025-05-28 PROCEDURE — 85025 COMPLETE CBC W/AUTO DIFF WBC: CPT

## 2025-05-28 PROCEDURE — 2580000003 HC RX 258: Performed by: SURGERY

## 2025-05-28 PROCEDURE — 6360000002 HC RX W HCPCS: Performed by: SURGERY

## 2025-05-28 PROCEDURE — 74018 RADEX ABDOMEN 1 VIEW: CPT

## 2025-05-28 PROCEDURE — 72128 CT CHEST SPINE W/O DYE: CPT

## 2025-05-28 PROCEDURE — 85520 HEPARIN ASSAY: CPT

## 2025-05-28 PROCEDURE — 92610 EVALUATE SWALLOWING FUNCTION: CPT

## 2025-05-28 PROCEDURE — 2000000000 HC ICU R&B

## 2025-05-28 PROCEDURE — 82962 GLUCOSE BLOOD TEST: CPT

## 2025-05-28 PROCEDURE — 2580000003 HC RX 258: Performed by: INTERNAL MEDICINE

## 2025-05-28 PROCEDURE — 6360000002 HC RX W HCPCS: Performed by: STUDENT IN AN ORGANIZED HEALTH CARE EDUCATION/TRAINING PROGRAM

## 2025-05-28 PROCEDURE — 83605 ASSAY OF LACTIC ACID: CPT

## 2025-05-28 PROCEDURE — 2500000003 HC RX 250 WO HCPCS: Performed by: PHYSICIAN ASSISTANT

## 2025-05-28 PROCEDURE — 85027 COMPLETE CBC AUTOMATED: CPT

## 2025-05-28 PROCEDURE — 2500000003 HC RX 250 WO HCPCS: Performed by: SURGERY

## 2025-05-28 PROCEDURE — 85610 PROTHROMBIN TIME: CPT

## 2025-05-28 PROCEDURE — P9047 ALBUMIN (HUMAN), 25%, 50ML: HCPCS | Performed by: SURGERY

## 2025-05-28 PROCEDURE — 72131 CT LUMBAR SPINE W/O DYE: CPT

## 2025-05-28 PROCEDURE — 37799 UNLISTED PX VASCULAR SURGERY: CPT

## 2025-05-28 PROCEDURE — 80048 BASIC METABOLIC PNL TOTAL CA: CPT

## 2025-05-28 PROCEDURE — 97166 OT EVAL MOD COMPLEX 45 MIN: CPT

## 2025-05-28 PROCEDURE — 84100 ASSAY OF PHOSPHORUS: CPT

## 2025-05-28 PROCEDURE — 2580000003 HC RX 258: Performed by: STUDENT IN AN ORGANIZED HEALTH CARE EDUCATION/TRAINING PROGRAM

## 2025-05-28 PROCEDURE — 80202 ASSAY OF VANCOMYCIN: CPT

## 2025-05-28 PROCEDURE — 82550 ASSAY OF CK (CPK): CPT

## 2025-05-28 PROCEDURE — 2500000003 HC RX 250 WO HCPCS: Performed by: ANESTHESIOLOGY

## 2025-05-28 PROCEDURE — 83735 ASSAY OF MAGNESIUM: CPT

## 2025-05-28 PROCEDURE — 6370000000 HC RX 637 (ALT 250 FOR IP): Performed by: NURSE PRACTITIONER

## 2025-05-28 PROCEDURE — 93308 TTE F-UP OR LMTD: CPT | Performed by: INTERNAL MEDICINE

## 2025-05-28 RX ORDER — NOREPINEPHRINE BITARTRATE 0.06 MG/ML
1-100 INJECTION, SOLUTION INTRAVENOUS CONTINUOUS
Status: DISCONTINUED | OUTPATIENT
Start: 2025-05-29 | End: 2025-05-29

## 2025-05-28 RX ORDER — BISACODYL 10 MG
10 SUPPOSITORY, RECTAL RECTAL ONCE
Status: COMPLETED | OUTPATIENT
Start: 2025-05-28 | End: 2025-05-28

## 2025-05-28 RX ADMIN — SUCRALFATE 1 G: 1 TABLET ORAL at 21:53

## 2025-05-28 RX ADMIN — HEPARIN SODIUM 16 UNITS/KG/HR: 10000 INJECTION, SOLUTION INTRAVENOUS at 10:28

## 2025-05-28 RX ADMIN — ALBUMIN (HUMAN) 12.5 G: 0.25 INJECTION, SOLUTION INTRAVENOUS at 10:53

## 2025-05-28 RX ADMIN — CEFTAROLINE FOSAMIL 200 MG: 600 POWDER, FOR SOLUTION INTRAVENOUS at 12:41

## 2025-05-28 RX ADMIN — DEXTROSE 150 MG: 50 INJECTION, SOLUTION INTRAVENOUS at 05:00

## 2025-05-28 RX ADMIN — SODIUM CHLORIDE, PRESERVATIVE FREE 10 ML: 5 INJECTION INTRAVENOUS at 10:30

## 2025-05-28 RX ADMIN — BISACODYL 10 MG: 10 SUPPOSITORY RECTAL at 16:54

## 2025-05-28 RX ADMIN — AMIODARONE HYDROCHLORIDE 1 MG/MIN: 50 INJECTION, SOLUTION INTRAVENOUS at 05:14

## 2025-05-28 RX ADMIN — MUPIROCIN: 20 OINTMENT TOPICAL at 10:30

## 2025-05-28 RX ADMIN — SODIUM CHLORIDE, PRESERVATIVE FREE 10 ML: 5 INJECTION INTRAVENOUS at 10:31

## 2025-05-28 RX ADMIN — ALBUMIN (HUMAN) 12.5 G: 0.25 INJECTION, SOLUTION INTRAVENOUS at 22:05

## 2025-05-28 RX ADMIN — SUCRALFATE 1 G: 1 TABLET ORAL at 10:32

## 2025-05-28 RX ADMIN — CHLORHEXIDINE GLUCONATE 118 ML: 4 SOLUTION TOPICAL at 10:31

## 2025-05-28 RX ADMIN — MUPIROCIN: 20 OINTMENT TOPICAL at 22:12

## 2025-05-28 RX ADMIN — CEFEPIME 1000 MG: 1 INJECTION, POWDER, FOR SOLUTION INTRAMUSCULAR; INTRAVENOUS at 05:22

## 2025-05-28 RX ADMIN — PANTOPRAZOLE SODIUM 40 MG: 40 INJECTION, POWDER, FOR SOLUTION INTRAVENOUS at 10:29

## 2025-05-28 RX ADMIN — DAPTOMYCIN 500 MG: 500 INJECTION, POWDER, LYOPHILIZED, FOR SOLUTION INTRAVENOUS at 13:40

## 2025-05-28 RX ADMIN — ALBUMIN (HUMAN) 12.5 G: 0.25 INJECTION, SOLUTION INTRAVENOUS at 03:58

## 2025-05-28 RX ADMIN — ACETAMINOPHEN 650 MG: 325 TABLET ORAL at 21:53

## 2025-05-28 RX ADMIN — CEFTAROLINE FOSAMIL 200 MG: 600 POWDER, FOR SOLUTION INTRAVENOUS at 22:09

## 2025-05-28 RX ADMIN — ALBUMIN (HUMAN) 12.5 G: 0.25 INJECTION, SOLUTION INTRAVENOUS at 16:54

## 2025-05-28 ASSESSMENT — PAIN SCALES - GENERAL
PAINLEVEL_OUTOF10: 0
PAINLEVEL_OUTOF10: 7
PAINLEVEL_OUTOF10: 0
PAINLEVEL_OUTOF10: 7
PAINLEVEL_OUTOF10: 0
PAINLEVEL_OUTOF10: 0

## 2025-05-28 ASSESSMENT — PAIN DESCRIPTION - ORIENTATION
ORIENTATION: LEFT
ORIENTATION: LEFT

## 2025-05-28 ASSESSMENT — PAIN - FUNCTIONAL ASSESSMENT: PAIN_FUNCTIONAL_ASSESSMENT: PREVENTS OR INTERFERES SOME ACTIVE ACTIVITIES AND ADLS

## 2025-05-28 ASSESSMENT — PAIN DESCRIPTION - LOCATION
LOCATION: LEG
LOCATION: LEG

## 2025-05-28 ASSESSMENT — PAIN DESCRIPTION - DESCRIPTORS
DESCRIPTORS: ACHING;BURNING
DESCRIPTORS: ACHING

## 2025-05-28 ASSESSMENT — PAIN SCALES - WONG BAKER: WONGBAKER_NUMERICALRESPONSE: HURTS A LITTLE BIT

## 2025-05-28 NOTE — PROGRESS NOTES
Inpatient Progress Note 5/28/2025        Edna Dowell  1940  [id number]      Assessment/Plan:  Edna Dowell is a 85 y.o. female  patient with past medical history of hypertension, CKD, history of venous thromboembolism, history of GI bleed, multiple myeloma, rheumatoid arthritis presented to the emergency room for evaluation of low blood pressure at her nursing home, found to be in septic shock, admitted to the ICU for further evaluation and care,        Problem list  Septic shock  APARNA on CKD  Metabolic acidosis  Anemia  thrombocytopenia  LE DVT on heparin  Hx of HTN  Hx of GIB  Hx of DVT  Hx of MM  Hx of RA     Neuro: alert, oriented and following commands. Pain control with multimodal regimen, adjust as needed  Cardio:  HDS, BP Soft. Tachycardic, off vasoactive agents. Echo 55-60% NWMA. Thickened mitral valve leaflets, no evidence of vegetation.   Resp: on 2L NC, Titrate of as able. Continue inhalers, nebs and aggressive pulm toileting.   GI: ADAT. GI ppx. Antiemetics as needed  : APARNA on CKD, cr 3.6 from 0.9.uop 225 ml overnight. Lasix challenge with only 300 ml of urine. May repeat tomorrow. Monitor for output, if unresponsive may require HD.  Monitor electrolytes and replenish as needed  Heme:  Hgb 8.3 s/p 1U PRBC, no evidence of bleeding. Monitor and transfuse as needed, plts 97. DVT ppx: Heparin infusion for LE DVT  ID: WBC 19.1 . On broad spectrum antibiotics. F/u cultures and sensitivities and de-escalate as able. Blood cultures with Staph aureus with noted possible resistance gene.  S/p removal of Port.   Endo: POC BG ISS PRN. Maintain euglycemia, avoid hypoglycemia.   MSK: DTI prevention, wound care.      Tubes/Lines/Drains:  [place], CVC, PIV     Code Status: DNR CCA       Subjective:    Patient was seen and evaluated at bedside. No acute events overnight. Hemodynamics appropriate, slightly tachcyardic. Noted to have had 300 ml of urine after Lasix challenge. She stated that she does not feel too

## 2025-05-28 NOTE — CONSULTS
Patient:   Edna Dowell    Date:  25  :  1940, 85 y.o.   Nephrologist: Kami Golden MD  Provider: Jarad, MD Bob    Reason for Consult: Stage III acute kidney injury    Consult requested by : Rajwinder Wong PA-C       Chief Complaint:   Generalized edema, feeling tired weak and poor oral intake x 2 weeks    HISTORY OF PRESENT ILLNESS/Brief hospital course  AND  brief background history    Ms. Dowell, is a 85-year young female, who was brought to the emergency department via emergency medical service from a nursing home with several symptoms.  Ms. Dowell told me she was not feeling very well for 2 weeks and she is swelling up all over her body, she has extreme fatigue tiredness, very poor oral intake.  I was able to review the emergency medical service note.  She was rather hypotensive blood pressure 60s over 40s and the nursing home could not get her oxygenation via pulse oximetry.  She was subsequently brought to our emergency department.    On arrival in our emergency department, she is afebrile but profoundly   hypotensive, blood pressure recorded 40s over 30s, and she underwent several diagnostic test.  Electrocardiogram showed sinus rhythm with premature ventricular complex and low voltage.  Imaging study mainly chest x-ray interpreted by radiologist as patchy retrocardiac atelectasis or infiltrate.  Biochemical testing showed elevated blood urea nitrogen and creatinine of 32 and 3.5 mg/dL.  Other pertinent abnormal lab include very low albumin of 2.1, elevated white count of 17,000 and low hemoglobin of 10,000, elevated lactic acid level of 5.2 as well as elevated procalcitonin level of 6.10 ng/mL.    Appropriate bodily fluid culture were drawn.  Broad-spectrum antimicrobial agent namely cefepime, and vancomycin were administered.  2 L of lactated Ringer also were administered as bolus.  Because of persistent hypotension norepinephrine has vasopressin were initiated and she 
24 hour central line rounding completed. No dressing change indicated. Dressing is clean, dry, and intact. Patient continues to meet the following criteria for central vascular access: Hemodialysis    Consult the Vascular Access Team for questions, concerns, or change in patient's condition.   
Department of General Surgery   Surgical Service Dr. Ralph   Consult Note    Date of Consult: 5/26/25    Reason for Consult:  MRSA bacteremia, possible port infection  Requesting Physician: Dr. Lino    CHIEF COMPLAINT:  MRSA bacteremia    History Obtained From:  patient, electronic medical record    HISTORY OF PRESENT ILLNESS:    The patient is a 85 y.o. female who presented with hypotension, tachycardia and has been treated for septic shock. Blood cultures growing MRSA. She has mediport that has been in place for about 15 years in the right chest. She uses it for chemotherapy infusions monthly. She also has history of GI bleed and multiple myeloma.  Currently she is awake and alert. Requiring vasopressin and norepi.    Past Medical History:    Past Medical History:   Diagnosis Date    Hypertension     Multiple myeloma (HCC)     Swelling     Thyroid disease        Past Surgical History:    Past Surgical History:   Procedure Laterality Date    APPENDECTOMY      CHOLECYSTECTOMY      COLON SURGERY      HYSTERECTOMY (CERVIX STATUS UNKNOWN)      INVASIVE VASCULAR N/A 7/18/2024    Venogram lower ext bilat performed by Nazario Ha MD at [facility] CARDIAC CATH LAB    UPPER GASTROINTESTINAL ENDOSCOPY N/A 3/20/2025    ESOPHAGOGASTRODUODENOSCOPY BIOPSY performed by James Steven MD at [facility] ENDOSCOPY       Current Medications:   Current Facility-Administered Medications   Medication Dose Route Frequency Provider Last Rate Last Admin    hydrocortisone sodium succinate PF (SOLU-CORTEF) injection 100 mg  100 mg IntraVENous Q8H Cele Lino MD   100 mg at 05/26/25 0948    albumin human 25% IV solution 12.5 g  12.5 g IntraVENous Q6H Cele Lino MD   Stopped at 05/26/25 1045    folic acid (FOLVITE) tablet 1 mg  1 mg Oral Daily Cele Lino MD   1 mg at 05/26/25 0948    therapeutic multivitamin-minerals 1 tablet  1 tablet Oral Daily Cele Lino MD   1 tablet at 05/26/25 0948    zinc sulfate (ZINCATE) 220 mg capsule - 
[facility] ACUTE CARE PHYSICAL THERAPY EVALUATION  Edna Dowell, 1940, 2119/2119-A, 5/28/2025    History  [place]:  The primary encounter diagnosis was APARNA (acute kidney injury). Diagnoses of Septic shock (HCC), Hypomagnesemia, Shock (HCC), History of DVT (deep vein thrombosis), and Infection of venous access port, initial encounter were also pertinent to this visit.  Patient  has a past medical history of Hypertension, Multiple myeloma (HCC), Swelling, and Thyroid disease.  Patient  has a past surgical history that includes Hysterectomy; Colon surgery; Appendectomy; Cholecystectomy; invasive vascular (N/A, 7/18/2024); and Upper gastrointestinal endoscopy (N/A, 3/20/2025).    Recommendation: Facility for moderate post-acute rehabilitation, anticipate 1-2 hours per day and 5 days per week.    Subjective:  Patient states: \"My head feels off\".    Pain:  denies at rest.    Communication with other providers:  RN approval for therapy session, MD, co-eval with DANK Black  Restrictions: general precautions, falls, contact precautions, campuzano, a-line    Home Setup/Prior level of function  Social/Functional History  Lives With: Spouse  Type of Home: Condo  Home Layout: One level  Home Access: Level entry  Bathroom Toilet: Standard  Bathroom Equipment: Shower chair, Grab bars in shower  Home Equipment: Walker - Rolling, Rollator  Prior Level of Assist for ADLs: Independent  Prior Level of Assist for Ambulation: Independent household ambulator, with or without device (mod I with 4ww)  Prior Level of Assist for Transfers: Independent  Active : No  Patient's  Info:      The above information was pulled from a recent encounter and verified with the patient and her     Examination of body systems (includes body structures/functions, activity/participation limitations):  Observation:  Supine in bed with  present upon arrival, agreeable to therapy  Vision:  WFL  Hearing:  slightly 
Late entry, blood cultures collected using ultrasound guidance.  Very poor blood return, requires multiple venipuncture attempts to obtain enough blood for culture sampling.  
None (dry) 05/27/25 1100   Drainage Description Serosanguinous 05/27/25 0800   Odor None 05/27/25 0800   Lisa-wound Assessment Fragile 05/27/25 1100   Margins Attached edges 05/27/25 1100   Wound Thickness Description not for Pressure Injury Partial thickness 05/27/25 0400   Number of days: 26       Wound 05/25/25 Tibial Left;Posterior;Proximal;Medial ecchymosis (Active)   Wound Image   05/27/25 1100   Wound Etiology Other 05/27/25 1100   Dressing Status Other (Comment) 05/27/25 0800   Wound Cleansed Not Cleansed 05/27/25 0400   Dressing/Treatment Open to air 05/27/25 1100   Wound Length (cm) 8 cm 05/27/25 1100   Wound Width (cm) 8 cm 05/27/25 1100   Wound Depth (cm) 0 cm 05/27/25 1100   Wound Surface Area (cm^2) 64 cm^2 05/27/25 1100   Wound Volume (cm^3) 0 cm^3 05/27/25 1100   Distance Tunneling (cm) 0 cm 05/27/25 1100   Tunneling Position ___ O'Clock 0 05/27/25 1100   Undermining Starts ___ O'Clock 0 05/27/25 1100   Undermining Ends___ O'Clock 0 05/27/25 1100   Undermining Maxium Distance (cm) 0 05/27/25 1100   Wound Assessment Purple/maroon;Fluid filled blister 05/27/25 1100   Drainage Amount None (dry) 05/27/25 1100   Odor None 05/27/25 0800   Lisa-wound Assessment Fragile 05/27/25 1100   Margins Attached edges 05/27/25 1100   Number of days: 1       Wound 05/25/25 Heel Left redness (Active)   Wound Etiology Pressure Mucous Membrane 05/27/25 0400   Dressing Status Other (Comment) 05/27/25 0800   Wound Cleansed Not Cleansed 05/27/25 0400   Dressing/Treatment Open to air 05/27/25 0800   Wound Length (cm) 0 cm 05/27/25 1100   Wound Width (cm) 0 cm 05/27/25 1100   Wound Depth (cm) 0 cm 05/27/25 1100   Wound Surface Area (cm^2) 0 cm^2 05/27/25 1100   Wound Volume (cm^3) 0 cm^3 05/27/25 1100   Wound Assessment Dry;Pink/red 05/27/25 0800   Drainage Amount None (dry) 05/27/25 0800   Odor None 05/27/25 0800   Lisa-wound Assessment Blanchable erythema 05/27/25 1100   Margins Undefined edges 05/27/25 0800   Wound 
PORTABLE CLINICAL HISTORY: 85 years  Female;  SOB COMPARISON: 3/19/2025 FINDINGS: Lungs: Patchy retrocardiac atelectasis or infiltrate correlation with symptoms advised. Costophrenic recesses are preserved. No cardiomegaly. Bones: No acute osseous findings. Right chest wall Port-A-Cath with tip in the SVC. IMPRESSION: 1.  Patchy retrocardiac atelectasis or infiltrate correlation with symptoms advised  Dictated and Electronically Signed By: Meghan Denton MD [facility] 5/25/2025 18:05          CBC:   Recent Labs     05/25/25  1640 05/27/25  0515   WBC 17.0* 20.2*   HGB 10.0* 6.5*     --      BMP:    Recent Labs     05/26/25  1558 05/26/25 2150 05/27/25  0240   * 134* 134*   K 3.8 3.5 3.5   CL 97* 98* 97*   CO2 22 22 23   BUN 35* 36* 37*   CREATININE 3.6* 3.4* 3.5*   GLUCOSE 103* 103* 97     Hepatic:   Recent Labs     05/25/25  1640 05/27/25  0515   AST 51* 37   ALT 24 18   BILITOT 0.2 0.3   ALKPHOS 288* 270*     Lipids: No results found for: \"CHOL\", \"HDL\", \"TRIG\"  Hemoglobin A1C:   Lab Results   Component Value Date/Time    LABA1C 5.8 07/18/2024 01:56 AM     TSH:   Lab Results   Component Value Date/Time    TSH 0.70 03/20/2025 05:00 AM     Troponin: No results found for: \"TROPONINT\"  Lactic Acid:   Recent Labs     05/26/25  1558 05/26/25 2150 05/27/25  0240   LACTA 2.6* 1.4 1.2     BNP: No results for input(s): \"PROBNP\" in the last 72 hours.  UA:  Lab Results   Component Value Date/Time    NITRU NEGATIVE 04/30/2025 04:09 PM    COLORU Yellow 04/30/2025 04:09 PM    PHUR 6.0 04/30/2025 04:09 PM    WBCUA 10 TO 20 04/30/2025 04:09 PM    RBCUA 3 to 5 04/30/2025 04:09 PM    MUCUS RARE 02/27/2025 09:27 PM    BACTERIA MANY 04/30/2025 04:09 PM    LEUKOCYTESUR MODERATE 04/30/2025 04:09 PM    UROBILINOGEN 0.2 04/30/2025 04:09 PM    BILIRUBINUR SMALL 04/30/2025 04:09 PM    GLUCOSEU NEGATIVE 04/30/2025 04:09 PM    KETUA TRACE 04/30/2025 04:09 PM     Urine Cultures: No results found for:

## 2025-05-28 NOTE — PROGRESS NOTES
Nephrology Progress Note  5/28/2025 7:31 AM        Subjective:   Admit Date: 5/25/2025  PCP: Bob Abraham MD    Interval History: Mediport was removed yesterday    Dysrhythmia with rapid ventricular response    Diet: She is not eating    ROS: No overt shortness of breath or confusion, urine output only 290 cc for the last 24 hours, she is currently with amiodarone and heparin infusion.  Remained afebrile with acceptable blood pressure normal without vasopressor    Data:     Current meds:    sodium chloride flush  5-40 mL IntraVENous 2 times per day    albumin human 25%  12.5 g IntraVENous Q6H    folic acid  1 mg Oral Daily    multivitamin  1 tablet Oral Daily    zinc sulfate  50 mg Oral Daily    thiamine  100 mg Oral Daily    magnesium sulfate  4,000 mg IntraVENous Once    mupirocin   Each Nostril BID    And    chlorhexidine gluconate   Topical Daily    sodium chloride flush  5-40 mL IntraVENous 2 times per day    pantoprazole  40 mg IntraVENous Daily    cefepime  1,000 mg IntraVENous Q12H    levothyroxine  150 mcg Oral QAM AC    sucralfate  1 g Oral 4x Daily AC & HS    vancomycin (VANCOCIN) intermittent dosing (placeholder)   Other RX Placeholder      amiodarone 1 mg/min (05/28/25 0514)    Followed by    amiodarone      heparin (PORCINE) Infusion 16 Units/kg/hr (05/28/25 0300)    sodium chloride           I/O last 3 completed shifts:  In: 2451.9 [I.V.:1216.3; Blood:250; IV Piggyback:985.7]  Out: 560 [Urine:560]    CBC:   Recent Labs     05/25/25  1640 05/27/25  0515 05/27/25  1026 05/27/25  1514 05/28/25  0600   WBC 17.0*   < > 20.7* 19.3* 19.1*   HGB 10.0*   < > 8.6* 8.2* 8.3*     --  112* 109*  --     < > = values in this interval not displayed.          Recent Labs     05/27/25  1514 05/27/25  2100 05/28/25  0300   * 137 137   K 3.3* 3.6 3.6   CL 97* 98* 98*   CO2 22 20* 19*   BUN 39* 39* 41*   CREATININE 3.5* 3.5* 3.5*   GLUCOSE 76 83 84       Lab Results   Component Value Date    CALCIUM 8.2 (L)

## 2025-05-28 NOTE — PROGRESS NOTES
Notified Sayda JOE that pt has not had a bowel movement for me today and notified her of the CT results. States to place an NG tube.

## 2025-05-28 NOTE — ANESTHESIA POSTPROCEDURE EVALUATION
Department of Anesthesiology  Postprocedure Note    Patient: Edna Dowell  MRN: [id number]  YOB: 1940  Date of evaluation: 5/28/2025    Procedure Summary       Date: 05/27/25 Room / Location: [address]    Anesthesia Start: 0854 Anesthesia Stop: 0942    Procedure: PORT REMOVAL Diagnosis:       Infection of venous access port, initial encounter      (Infection of venous access port, initial encounter [T80.219A])    Surgeons: Rai Ralph MD Responsible Provider: Cam Canada MD    Anesthesia Type: MAC ASA Status: 3            Anesthesia Type: No value filed.    Jacqueline Phase I: Jacqueline Score: 9    Jacqueline Phase II:      Anesthesia Post Evaluation    Patient location during evaluation: bedside  Patient participation: complete - patient participated  Level of consciousness: awake and alert  Airway patency: patent  Nausea & Vomiting: no nausea and no vomiting  Cardiovascular status: hemodynamically stable  Respiratory status: acceptable  Hydration status: euvolemic  Pain management: adequate        No notable events documented.

## 2025-05-28 NOTE — PROGRESS NOTES
Facility/Department: [facility] ICU   CLINICAL BEDSIDE SWALLOW EVALUATION    NAME: Edna Dowell  : 1940  MRN: [id number]    ADMISSION DATE: 2025  ADMITTING DIAGNOSIS: has Leg DVT (deep venous thromboembolism), acute, left (HCC); Thrombocytopenia, unspecified; History of DVT (deep vein thrombosis); Generalized weakness; Moderate malnutrition; GI bleed; Sepsis secondary to UTI (HCC); Septic Shock/Bacteremia/APARNA/DVT; and APARNA (acute kidney injury) on their problem list.  ONSET DATE: this admission    Recent Chest Xray/CT of Chest: Chest xray on 25 reveals:  1.  New left-sided IJ line with the tip in the region of the lower SVC.  2.  Small left-sided pleural effusion with adjacent airspace disease, which may represent atelectasis versus an infectious process.  3.  Bilateral perihilar opacities, which may represent atelectasis, edema or infectious process.    Date of Eval: 2025  Evaluating Therapist: Kristine Salazar SLP    Impression: Edna Dowell was seen today for a clinical bedside swallow evaluation following admission to [facility] with hypomagnesemia. Relevant medical hx includes of hypertension, CKD, history of venous thromboembolism, history of GI bleed, multiple myeloma, rheumatoid arthritis. BALTAZAR Antony reports pt had difficulty with meds last night and cough with sips of water.    Edna Dowell was positioned upright in bed with her  at bedside for the current visit. Pt agreeable and cooperative throughout visit, appears weak. She denies difficulty swallowing, states she has not had anything to eat. Pt on 2L O2 via NC. Top dentures placed at start of visit. Oral mechanism examination reveals no gross facial asymmetry, weak labial seal, and reduced lingual strength/rate. Vocal quality is weak.    Edna Dowell consumed trials of ice chips, thin liquid via tsp/cup/straw, puree, and soft solid during this visit. Pt presents with evidence of oropharyngeal dysphagia characterized by reduced labial seal,

## 2025-05-28 NOTE — PROGRESS NOTES
Spiritual Health History and Assessment/Progress Note  [facility]    Spiritual/Emotional Needs,  ,  ,      Name: Edna Dowell MRN: [id number]    Age: 85 y.o.     Sex: female   Language: English   Confucianism: None   Shock (HCC)     Date: 5/28/2025            Total Time Calculated: 6 min              [facility]        Referral/Consult From: Rounding   Encounter Overview/Reason: Spiritual/Emotional Needs  Service Provided For: Patient and family together    Terra, Belief, Meaning:   Patient unable to assess at this time  Family/Friends Other: Unable to assess at this time      Importance and Influence:  Patient unable to assess at this time  Family/Friends     Community:  Patient feels well-supported. Support system includes: Spouse/Partner and Extended family  Family/Friends feel well-supported. Support system includes: Spouse/Partner and Extended family    Assessment and Plan of Care:     Patient Interventions include: Facilitated expression of thoughts and feelings  Family/Friends Interventions include: Facilitated expression of thoughts and feelings    Patient Plan of Care: Spiritual Care available upon further referral  Family/Friends Plan of Care: Spiritual Care available upon further referral    Electronically signed by Chaplain Barry on 5/28/2025 at 4:06 PM

## 2025-05-28 NOTE — PROGRESS NOTES
GENERAL SURGERY PROGRESS NOTE    Edna Dowell is a 85 y.o. female with MRSA bacteremia, concern for infected port. S/p mediport removal on .                Subjective:  Doing ok this morning. Tolerating a little applesauce when I saw her.     Objective:    Vitals: VITALS:  BP (!) 98/43   Pulse (!) 109   Temp (!) 96.3 °F (35.7 °C) (Rectal)   Resp 19   Ht 1.626 m (5' 4\")   Wt 81.3 kg (179 lb 3.7 oz)   SpO2 100%   BMI 30.77 kg/m²     I/O:  0701 -  0700  In: 1305.7 [I.V.:241.1]  Out: 290 [Urine:290]    Labs/Imaging Results:   Lab Results   Component Value Date/Time     2025 10:20 AM    K 3.4 2025 10:20 AM    CL 97 2025 10:20 AM    CO2 18 2025 10:20 AM    BUN 42 2025 10:20 AM    CREATININE 3.6 2025 10:20 AM    GLUCOSE 109 2025 10:20 AM    CALCIUM 8.6 2025 10:20 AM      Lab Results   Component Value Date    WBC 19.1 (H) 2025    HGB 8.3 (L) 2025    HCT 24.4 (L) 2025    MCV 90.0 2025     (L) 2025         IV Fluids:   [COMPLETED] amiodarone Last Rate: Stopped (25 0517) **FOLLOWED BY** [] amiodarone Last Rate: 1 mg/min (25 1038) **FOLLOWED BY** amiodarone Last Rate: 0.5 mg/min (25 1053)    heparin (PORCINE) Infusion Last Rate: 16 Units/kg/hr (25 1038)    sodium chloride    Scheduled Meds:   ceftaroline fosamil (TEFLARO) IVPB, 200 mg, IntraVENous, q8h    DAPTOmycin (CUBICIN) 500 mg in sodium chloride 0.9 % 50 mL IVPB, 8 mg/kg (Adjusted), IntraVENous, Q48H    sodium chloride flush, 5-40 mL, IntraVENous, 2 times per day    albumin human 25%, 12.5 g, IntraVENous, Q6H    folic acid, 1 mg, Oral, Daily    multivitamin, 1 tablet, Oral, Daily    zinc sulfate, 50 mg, Oral, Daily    thiamine, 100 mg, Oral, Daily    magnesium sulfate, 4,000 mg, IntraVENous, Once    mupirocin, , Each Nostril, BID **AND** chlorhexidine gluconate, , Topical, Daily    sodium chloride flush, 5-40 mL, IntraVENous, 2

## 2025-05-28 NOTE — CARE COORDINATION
Attempted visit; no family present. Will revisit to discuss return to [facility] to complete rehab. Melody Ham RN

## 2025-05-28 NOTE — PROGRESS NOTES
Occupational Therapy  [facility] ACUTE CARE OCCUPATIONAL THERAPY EVALUATION  Edna Dowell, 1940, 2119/2119-A, 5/28/2025    Discharge Recommendation: Facility for moderate post-acute rehabilitation, anticipate 1-2 hours per day and 5 days per week.    History  [place]:  The primary encounter diagnosis was APARNA (acute kidney injury). Diagnoses of Septic shock (HCC), Hypomagnesemia, Shock (HCC), History of DVT (deep vein thrombosis), and Infection of venous access port, initial encounter were also pertinent to this visit.  Patient  has a past medical history of Hypertension, Multiple myeloma (HCC), Swelling, and Thyroid disease.  Patient  has a past surgical history that includes Hysterectomy; Colon surgery; Appendectomy; Cholecystectomy; invasive vascular (N/A, 7/18/2024); and Upper gastrointestinal endoscopy (N/A, 3/20/2025).    Subjective:  Patient comments: \"Honey, I am weak\".    Pain:  Denied.    Communication with other providers: Nurse oliva session, co-eval with PT Meron.  Restrictions: General Precautions, Fall Risk, Contact (MRSA)     Home Setup/Prior level of function   Social/Functional History  Lives With: Spouse  Type of Home: Condo  Home Layout: One level  Home Access: Level entry  Bathroom Toilet: Standard  Bathroom Equipment: Shower chair, Grab bars in shower  Home Equipment: Walker - Rolling, Rollator  Prior Level of Assist for ADLs: Independent  Prior Level of Assist for Ambulation: Independent household ambulator, with or without device (mod I with 4ww)  Prior Level of Assist for Transfers: Independent  Active : No  Patient's  Info:     The above information was pulled from a recent encounter and verified with the patient     Examination of body systems (includes body structures/functions, activity/participation limitations):  Observation:  Semi-fowlers in bed upon arrival, agreeable to therapy.  at bedside   Vision: Glasses   Hearing: WFL   Cardiopulmonary:

## 2025-05-28 NOTE — PROGRESS NOTES
Called lab at this time for anti-xa results. None showing up at this time. Lab states they did not receive a sample. Need another one sent.

## 2025-05-29 ENCOUNTER — HOSPITAL ENCOUNTER (INPATIENT)
Age: 85
LOS: 1 days | DRG: 951 | End: 2025-05-30
Attending: STUDENT IN AN ORGANIZED HEALTH CARE EDUCATION/TRAINING PROGRAM | Admitting: STUDENT IN AN ORGANIZED HEALTH CARE EDUCATION/TRAINING PROGRAM
Payer: MEDICARE

## 2025-05-29 VITALS
HEART RATE: 108 BPM | HEIGHT: 64 IN | SYSTOLIC BLOOD PRESSURE: 98 MMHG | OXYGEN SATURATION: 92 % | TEMPERATURE: 96.3 F | WEIGHT: 179.23 LBS | RESPIRATION RATE: 11 BRPM | BODY MASS INDEX: 30.6 KG/M2 | DIASTOLIC BLOOD PRESSURE: 43 MMHG

## 2025-05-29 LAB
ALBUMIN SERPL-MCNC: 3 G/DL (ref 3.4–5)
ALBUMIN/GLOB SERPL: 1.9 {RATIO} (ref 1.1–2.2)
ALP SERPL-CCNC: 177 U/L (ref 40–129)
ALT SERPL-CCNC: 17 U/L (ref 10–40)
ANION GAP SERPL CALCULATED.3IONS-SCNC: 19 MMOL/L (ref 9–17)
ANTI-XA UNFRAC HEPARIN: 0.52 IU/L
AST SERPL-CCNC: 21 U/L (ref 15–37)
BASOPHILS # BLD: 0 K/UL
BASOPHILS NFR BLD: 0 % (ref 0–1)
BILIRUB DIRECT SERPL-MCNC: 0.3 MG/DL (ref 0–0.3)
BILIRUB INDIRECT SERPL-MCNC: 0.1 MG/DL (ref 0–0.7)
BILIRUB SERPL-MCNC: 0.4 MG/DL (ref 0–1)
BUN SERPL-MCNC: 45 MG/DL (ref 7–20)
CALCIUM SERPL-MCNC: 8.4 MG/DL (ref 8.3–10.6)
CHLORIDE SERPL-SCNC: 96 MMOL/L (ref 99–110)
CO2 SERPL-SCNC: 19 MMOL/L (ref 21–32)
CREAT SERPL-MCNC: 3.6 MG/DL (ref 0.6–1.2)
EOSINOPHIL # BLD: 0 K/UL
EOSINOPHILS RELATIVE PERCENT: 0 % (ref 0–3)
ERYTHROCYTE [DISTWIDTH] IN BLOOD BY AUTOMATED COUNT: 17.7 % (ref 11.7–14.9)
GFR, ESTIMATED: 12 ML/MIN/1.73M2
GLUCOSE BLD-MCNC: 99 MG/DL (ref 74–99)
GLUCOSE SERPL-MCNC: 94 MG/DL (ref 74–99)
HCT VFR BLD AUTO: 22.3 % (ref 37–47)
HGB BLD-MCNC: 7.6 G/DL (ref 12.5–16)
INR PPP: 1.8
LYMPHOCYTES NFR BLD: 1.69 K/UL
LYMPHOCYTES RELATIVE PERCENT: 13 % (ref 24–44)
MAGNESIUM SERPL-MCNC: 2.1 MG/DL (ref 1.8–2.4)
MCH RBC QN AUTO: 30.4 PG (ref 27–31)
MCHC RBC AUTO-ENTMCNC: 34.1 G/DL (ref 32–36)
MCV RBC AUTO: 89.2 FL (ref 78–100)
MICROORGANISM SPEC CULT: ABNORMAL
MICROORGANISM/AGENT SPEC: ABNORMAL
MONOCYTES NFR BLD: 0.26 K/UL
MONOCYTES NFR BLD: 2 % (ref 0–5)
NEUTROPHILS NFR BLD: 85 % (ref 36–66)
NEUTS SEG NFR BLD: 11.05 K/UL
PHOSPHATE SERPL-MCNC: 3.7 MG/DL (ref 2.5–4.9)
PLATELET CONFIRMATION: NORMAL
PLATELET, FLUORESCENCE: 74 K/UL (ref 140–440)
PMV BLD AUTO: 11.6 FL (ref 7.5–11.1)
POTASSIUM SERPL-SCNC: 3 MMOL/L (ref 3.5–5.1)
PROT SERPL-MCNC: 4.6 G/DL (ref 6.4–8.2)
PROTHROMBIN TIME: 20.6 SEC (ref 11.7–14.5)
RBC # BLD AUTO: 2.5 M/UL (ref 4.2–5.4)
SERVICE CMNT-IMP: ABNORMAL
SODIUM SERPL-SCNC: 134 MMOL/L (ref 136–145)
SPECIMEN DESCRIPTION: ABNORMAL
WBC OTHER # BLD: 13 K/UL (ref 4–10.5)

## 2025-05-29 PROCEDURE — 83735 ASSAY OF MAGNESIUM: CPT

## 2025-05-29 PROCEDURE — 85025 COMPLETE CBC W/AUTO DIFF WBC: CPT

## 2025-05-29 PROCEDURE — 1250000000 HC SEMI PRIVATE HOSPICE R&B

## 2025-05-29 PROCEDURE — 94761 N-INVAS EAR/PLS OXIMETRY MLT: CPT

## 2025-05-29 PROCEDURE — 82248 BILIRUBIN DIRECT: CPT

## 2025-05-29 PROCEDURE — 85610 PROTHROMBIN TIME: CPT

## 2025-05-29 PROCEDURE — 37799 UNLISTED PX VASCULAR SURGERY: CPT

## 2025-05-29 PROCEDURE — 80053 COMPREHEN METABOLIC PANEL: CPT

## 2025-05-29 PROCEDURE — 2580000003 HC RX 258: Performed by: INTERNAL MEDICINE

## 2025-05-29 PROCEDURE — 2700000000 HC OXYGEN THERAPY PER DAY

## 2025-05-29 PROCEDURE — 6360000002 HC RX W HCPCS: Performed by: STUDENT IN AN ORGANIZED HEALTH CARE EDUCATION/TRAINING PROGRAM

## 2025-05-29 PROCEDURE — 6360000002 HC RX W HCPCS: Performed by: INTERNAL MEDICINE

## 2025-05-29 PROCEDURE — 82962 GLUCOSE BLOOD TEST: CPT

## 2025-05-29 PROCEDURE — 85520 HEPARIN ASSAY: CPT

## 2025-05-29 PROCEDURE — 84100 ASSAY OF PHOSPHORUS: CPT

## 2025-05-29 RX ORDER — ONDANSETRON 4 MG/1
4 TABLET, ORALLY DISINTEGRATING ORAL EVERY 8 HOURS PRN
Status: DISCONTINUED | OUTPATIENT
Start: 2025-05-29 | End: 2025-05-30 | Stop reason: HOSPADM

## 2025-05-29 RX ORDER — ONDANSETRON 2 MG/ML
4 INJECTION INTRAMUSCULAR; INTRAVENOUS EVERY 6 HOURS PRN
Status: CANCELLED | OUTPATIENT
Start: 2025-05-29

## 2025-05-29 RX ORDER — MORPHINE SULFATE 2 MG/ML
2 INJECTION, SOLUTION INTRAMUSCULAR; INTRAVENOUS
Status: DISCONTINUED | OUTPATIENT
Start: 2025-05-29 | End: 2025-05-29 | Stop reason: HOSPADM

## 2025-05-29 RX ORDER — LORAZEPAM 2 MG/ML
1 CONCENTRATE ORAL EVERY 8 HOURS PRN
Status: DISCONTINUED | OUTPATIENT
Start: 2025-05-29 | End: 2025-05-30 | Stop reason: HOSPADM

## 2025-05-29 RX ORDER — ONDANSETRON 2 MG/ML
4 INJECTION INTRAMUSCULAR; INTRAVENOUS EVERY 6 HOURS PRN
Status: DISCONTINUED | OUTPATIENT
Start: 2025-05-29 | End: 2025-05-30 | Stop reason: HOSPADM

## 2025-05-29 RX ORDER — MORPHINE SULFATE 4 MG/ML
4 INJECTION, SOLUTION INTRAMUSCULAR; INTRAVENOUS
Refills: 0 | Status: DISCONTINUED | OUTPATIENT
Start: 2025-05-29 | End: 2025-05-30 | Stop reason: HOSPADM

## 2025-05-29 RX ORDER — SODIUM CHLORIDE 9 MG/ML
INJECTION, SOLUTION INTRAVENOUS PRN
Status: DISCONTINUED | OUTPATIENT
Start: 2025-05-29 | End: 2025-05-30 | Stop reason: HOSPADM

## 2025-05-29 RX ORDER — ACETAMINOPHEN 650 MG/1
650 SUPPOSITORY RECTAL EVERY 6 HOURS PRN
Status: DISCONTINUED | OUTPATIENT
Start: 2025-05-29 | End: 2025-05-30 | Stop reason: HOSPADM

## 2025-05-29 RX ORDER — ACETAMINOPHEN 325 MG/1
650 TABLET ORAL EVERY 6 HOURS PRN
Status: CANCELLED | OUTPATIENT
Start: 2025-05-29

## 2025-05-29 RX ORDER — SCOPOLAMINE 1 MG/3D
1 PATCH, EXTENDED RELEASE TRANSDERMAL
Status: CANCELLED | OUTPATIENT
Start: 2025-06-01

## 2025-05-29 RX ORDER — LORAZEPAM 2 MG/ML
1 CONCENTRATE ORAL EVERY 8 HOURS PRN
Status: DISCONTINUED | OUTPATIENT
Start: 2025-05-29 | End: 2025-05-29 | Stop reason: HOSPADM

## 2025-05-29 RX ORDER — ONDANSETRON 4 MG/1
4 TABLET, ORALLY DISINTEGRATING ORAL EVERY 8 HOURS PRN
Status: CANCELLED | OUTPATIENT
Start: 2025-05-29

## 2025-05-29 RX ORDER — LORAZEPAM 2 MG/ML
1 CONCENTRATE ORAL EVERY 8 HOURS PRN
Status: CANCELLED | OUTPATIENT
Start: 2025-05-29

## 2025-05-29 RX ORDER — MORPHINE SULFATE 2 MG/ML
2 INJECTION, SOLUTION INTRAMUSCULAR; INTRAVENOUS
Refills: 0 | Status: CANCELLED | OUTPATIENT
Start: 2025-05-29

## 2025-05-29 RX ORDER — SCOPOLAMINE 1 MG/3D
1 PATCH, EXTENDED RELEASE TRANSDERMAL
Status: DISCONTINUED | OUTPATIENT
Start: 2025-06-01 | End: 2025-05-30 | Stop reason: HOSPADM

## 2025-05-29 RX ORDER — MORPHINE SULFATE 4 MG/ML
4 INJECTION, SOLUTION INTRAMUSCULAR; INTRAVENOUS
Status: DISCONTINUED | OUTPATIENT
Start: 2025-05-29 | End: 2025-05-29 | Stop reason: HOSPADM

## 2025-05-29 RX ORDER — SODIUM CHLORIDE 9 MG/ML
INJECTION, SOLUTION INTRAVENOUS PRN
Status: CANCELLED | OUTPATIENT
Start: 2025-05-29

## 2025-05-29 RX ORDER — SCOPOLAMINE 1 MG/3D
1 PATCH, EXTENDED RELEASE TRANSDERMAL
Status: DISCONTINUED | OUTPATIENT
Start: 2025-05-29 | End: 2025-05-29 | Stop reason: HOSPADM

## 2025-05-29 RX ORDER — MORPHINE SULFATE 2 MG/ML
2 INJECTION, SOLUTION INTRAMUSCULAR; INTRAVENOUS
Refills: 0 | Status: DISCONTINUED | OUTPATIENT
Start: 2025-05-29 | End: 2025-05-30 | Stop reason: HOSPADM

## 2025-05-29 RX ORDER — ACETAMINOPHEN 650 MG/1
650 SUPPOSITORY RECTAL EVERY 6 HOURS PRN
Status: CANCELLED | OUTPATIENT
Start: 2025-05-29

## 2025-05-29 RX ORDER — ACETAMINOPHEN 325 MG/1
650 TABLET ORAL EVERY 6 HOURS PRN
Status: DISCONTINUED | OUTPATIENT
Start: 2025-05-29 | End: 2025-05-30 | Stop reason: HOSPADM

## 2025-05-29 RX ORDER — MORPHINE SULFATE 4 MG/ML
4 INJECTION, SOLUTION INTRAMUSCULAR; INTRAVENOUS
Refills: 0 | Status: CANCELLED | OUTPATIENT
Start: 2025-05-29

## 2025-05-29 RX ADMIN — MORPHINE SULFATE 2 MG: 2 INJECTION, SOLUTION INTRAMUSCULAR; INTRAVENOUS at 15:07

## 2025-05-29 RX ADMIN — MORPHINE SULFATE 4 MG: 4 INJECTION, SOLUTION INTRAMUSCULAR; INTRAVENOUS at 05:25

## 2025-05-29 RX ADMIN — MORPHINE SULFATE 2 MG: 2 INJECTION, SOLUTION INTRAMUSCULAR; INTRAVENOUS at 20:14

## 2025-05-29 RX ADMIN — MORPHINE SULFATE 4 MG: 4 INJECTION, SOLUTION INTRAMUSCULAR; INTRAVENOUS at 07:25

## 2025-05-29 RX ADMIN — MORPHINE SULFATE 2 MG: 2 INJECTION, SOLUTION INTRAMUSCULAR; INTRAVENOUS at 18:10

## 2025-05-29 RX ADMIN — MORPHINE SULFATE 2 MG: 2 INJECTION, SOLUTION INTRAMUSCULAR; INTRAVENOUS at 11:31

## 2025-05-29 RX ADMIN — AMIODARONE HYDROCHLORIDE 0.5 MG/MIN: 50 INJECTION, SOLUTION INTRAVENOUS at 01:56

## 2025-05-29 ASSESSMENT — PAIN - FUNCTIONAL ASSESSMENT
PAIN_FUNCTIONAL_ASSESSMENT: PREVENTS OR INTERFERES WITH ALL ACTIVE AND SOME PASSIVE ACTIVITIES
PAIN_FUNCTIONAL_ASSESSMENT: PREVENTS OR INTERFERES WITH ALL ACTIVE AND SOME PASSIVE ACTIVITIES
PAIN_FUNCTIONAL_ASSESSMENT: ACTIVITIES ARE NOT PREVENTED

## 2025-05-29 ASSESSMENT — PAIN DESCRIPTION - LOCATION
LOCATION: GENERALIZED

## 2025-05-29 ASSESSMENT — PAIN DESCRIPTION - PAIN TYPE: TYPE: ACUTE PAIN

## 2025-05-29 ASSESSMENT — PAIN DESCRIPTION - DESCRIPTORS
DESCRIPTORS: ACHING;DISCOMFORT

## 2025-05-29 ASSESSMENT — PAIN SCALES - GENERAL
PAINLEVEL_OUTOF10: 3
PAINLEVEL_OUTOF10: 4
PAINLEVEL_OUTOF10: 4
PAINLEVEL_OUTOF10: 3
PAINLEVEL_OUTOF10: 2
PAINLEVEL_OUTOF10: 4

## 2025-05-29 ASSESSMENT — PAIN DESCRIPTION - ORIENTATION
ORIENTATION: RIGHT;LEFT;MID

## 2025-05-29 ASSESSMENT — PAIN DESCRIPTION - FREQUENCY: FREQUENCY: CONTINUOUS

## 2025-05-29 ASSESSMENT — PAIN SCALES - WONG BAKER: WONGBAKER_NUMERICALRESPONSE: HURTS A LITTLE BIT

## 2025-05-29 ASSESSMENT — PAIN DESCRIPTION - ONSET: ONSET: ON-GOING

## 2025-05-29 NOTE — H&P
pleural effusions and bilateral consolidation the dependent right lower lung and throughout the left lower lung and dependent left upper lung, may be passive atelectasis and/or pneumonia. Radiopaque material in the distal airways of the lung bases may be from prior aspiration. 2.  Mild distention and increased fluid in the stomach and small bowel, without focal transition, most likely ileus, although a developing obstructive process is not excluded. No pneumatosis or evidence of perforation. 3.  Liquid stool within relatively decompressed colon may be indicative of diarrhea. 4.  Multiple additional ancillary findings as above. THORACIC AND LUMBAR SPINE: 1.  No evidence of acute fracture of the thoracic or lumbar spine. 2.  Demineralized bones. Multilevel spondylosis, most severely in the lower thoracic spine and upper lumbar spine. Thoracolumbar scoliosis, previous vertebroplasty at T12 and L1 and multiple nonacute compression deformities in the lumbar spine are detailed above. This dictation was created with voice recognition software.  While attempts have  been made to review the dictation as it is transcribed, on occasion the spoken word can be misinterpreted by the technology leading to omissions or inappropriate words, phrases or sentences.  Dictated and Electronically Signed By: Shaniqua Jeff MD [facility] [place] [facility] 5/28/2025 14:46        Echo (TTE) limited (PRN contrast/bubble/strain/3D)  Result Date: 5/28/2025    Left Ventricle: Normal left ventricular systolic function with a visually estimated EF of 55 - 60%. Left ventricle size is normal. Normal wall thickness. Normal wall motion.   Aortic Valve: Aortic valve leaflets are thickened, new finding from previous echo.   Mitral Valve: Thickened leaflets.   Tricuspid Valve: Mild regurgitation. RVSP is 46 mmHg.   Pericardium: There is evidence of epicardial fat. No pericardial effusion.     Vascular duplex lower extremity venous left  Result Date:

## 2025-05-29 NOTE — CARE COORDINATION
Advised by Dr Lino of urgent hospice referral. Antonio contacted nurse for this pt and she advised that  had just left and that only in-laws are at the bedside. RN will contact CM as soon as hsb gets back to hospital.    0850 Attempted to contact pt's  phone and there was no answer and no VM to leave a message.    1039 Cm was able to meet with pt's , his sister in law and a friend at bedside. Cm initiated conversation re; hospice referral and they collectively advise that they do not want hospice referral as they do not expect pt to last much longer and do not feel it will be needed. Dr Lino advised of family decision to forgo hospice referral at this time.

## 2025-05-29 NOTE — PLAN OF CARE
Problem: Safety - Adult  Goal: Free from fall injury  Outcome: Progressing     Problem: Discharge Planning  Goal: Discharge to home or other facility with appropriate resources  Outcome: Progressing     Problem: Skin/Tissue Integrity  Goal: Skin integrity remains intact  Description: 1.  Monitor for areas of redness and/or skin breakdown2.  Assess vascular access sites hourly3.  Every 4-6 hours minimum:  Change oxygen saturation probe site4.  Every 4-6 hours:  If on nasal continuous positive airway pressure, respiratory therapy assess nares and determine need for appliance change or resting period  Outcome: Progressing     Problem: ABCDS Injury Assessment  Goal: Absence of physical injury  Outcome: Progressing     Problem: Pain  Goal: Verbalizes/displays adequate comfort level or baseline comfort level  Outcome: Progressing

## 2025-05-29 NOTE — PROGRESS NOTES
While this RN and secondary RN bedside, pt stated \"call my family and tell them to come here\" RN called pt's  and allowed pt to speak while on speaker phone, pt stated \"I am going to die tonight; I don't feel well\" . Upon further questioning, pt endorsed that she did not know why she felt this way, but she felt \"off\". Pt vitals clinically stable and endorses no complaints other than generalized pain. PRN medication given for pain; pt family stated they will arrive within the hour. Notified MD; care ongoing.

## 2025-05-29 NOTE — FLOWSHEET NOTE
RN notified MD pt's blood pressure trending down and pt has become less alert and slower to respond to verbal stimuli; new order for Levophed rcvd. This RN bedside to notify pt's  of new medication orders; pt's family replied \"that is just prolonging the inevitable correct? \" RN explained the purpose of levophed and pt's  requested to speak with nursing staff regarding code status change because he \"wants to let  her go peacefully\". Notified MD bedside, pt's code status changed. Care ongoing.        05/28/25 2331   Vitals   Temp (!) 96.3 °F (35.7 °C)   Temp Source Rectal   Pulse (!) 111   Heart Rate Source Monitor   Respirations 13   BP Location Arterial   Cardiac Rhythm Atrial fib   Art Line   ABP (Arterial line BP) (!) 89/48   ABP Mean (Arterial Line Mean) (!) 64 mmHg

## 2025-05-29 NOTE — DISCHARGE SUMMARY
V2.0  Discharge Summary    Name:  Edna Dowell /Age/Sex: 1940 (85 y.o. female)   Admit Date: 2025  Discharge Date: 25    MRN & CSN:  [id number] & [id number] Encounter Date and Time 25 11:35 AM EDT    Attending:  Bee Bazzi* Discharging Provider: Bee Bazzi MD       Hospital Course:     Brief HPI: Edna Dowell is a 85 y.o. female  with pmh of  hypertension, CKD, history of venous thromboembolism, history of GI bleed, multiple myeloma, and rheumatoid arthritis who presented with Shock (HCC) 2/ to MRSA bacteremia. She was hypotensive on presentation, tachycardic, tachypneic and requiring 2L of O2 on admission. She was admitted to the ICU and started on vasoactive agents. Overnight she declined and requested to be made DNR CC and transitioned to comfort care. Below is a brief POC from her hospital course;    Brief Problem Based Course:   Afib w/ RVR  , started on amio drip per CCT  --Transitioned off at this time     Septic shock  MRSA bacteremia  Patient presented hypotensive and requiring vasoactive agents  --Admitted to ICU and being managed primarily by CCT  --Weaned off vasoactive agents as able. LA downtrended to 1.2  --She is MRSA positive with swab and Bcx, mediport of concern as source, was removed by Gsx   --TTE shows thickened AV valves  --Was transitioned to dapto and ceftaroline per CCT,   --At this time off all meds except comfort meds     Acute anemia  Hx of GIB  --Hgb 7.6 this AM s/p 1 unit of pRBC  when  hgb was 6.5      APARNA on CKD  Hypokalemia  Metabolic acidosis  --Nephrology was on board, baseline ~0.9, currently 3.6.   --No further mgmt at this time     Hx of HTN  Hold antihypertensives given need for vasoactive agents, although now weaned off.      Hx of DVT  --Was on heparin drip, now discontinued    Patient is terminal and being transitioned to the ICU    The patient expressed appropriate understanding of, and agreement with the

## 2025-05-29 NOTE — PROGRESS NOTES
Inpatient Progress Note 5/29/2025        Edna Dowell  1940  [id number]      Assessment/Plan:  Edna Dowell is a 85 y.o. female  patient with past medical history of hypertension, CKD, history of venous thromboembolism, history of GI bleed, multiple myeloma, rheumatoid arthritis presented to the emergency room for evaluation of low blood pressure at her nursing home, found to be in septic shock, admitted to the ICU for further evaluation and care,        Problem list  Septic shock  APARNA on CKD  Metabolic acidosis  Anemia  thrombocytopenia  LE DVT on heparin  Hx of HTN  Hx of GIB  Hx of DVT  Hx of MM  Hx of RA    Transitioned to hospice, withdrawal of care  Comfort care orders placed, Hospice consulted    Transfer out of ICU    Tubes/Lines/Drains:  CVC, Nathaly    Code Status: DNR CC      Subjective:    Patient seen and evaluated at bedside. Transitioned to hospice overnight.     Physical Exam:            BP (!) 98/43   Pulse 99   Temp (!) 96.3 °F (35.7 °C) (Rectal)   Resp (!) 9   Ht 1.626 m (5' 4\")   Wt 81.3 kg (179 lb 3.7 oz)   SpO2 94%   BMI 30.77 kg/m²     General: ill appearing  Eyes: EOMI  ENT: neck supple  Cardiovascular: Regular rate.  Respiratory: crackles  Gastrointestinal: Soft, non tender  Genitourinary: no suprapubic tenderness  Musculoskeletal: 2+ edema.  Skin: warm, dry  Neuro: sleepy, minimally responsive.      Current Medications:   scopolamine  1 patch TransDERmal Q72H    ceftaroline fosamil (TEFLARO) IVPB  200 mg IntraVENous q8h    DAPTOmycin (CUBICIN) 500 mg in sodium chloride 0.9 % 50 mL IVPB  8 mg/kg (Adjusted) IntraVENous Q48H    sodium chloride flush  5-40 mL IntraVENous 2 times per day    folic acid  1 mg Oral Daily    multivitamin  1 tablet Oral Daily    zinc sulfate  50 mg Oral Daily    thiamine  100 mg Oral Daily    magnesium sulfate  4,000 mg IntraVENous Once    mupirocin   Each Nostril BID    And    chlorhexidine gluconate   Topical Daily    sodium chloride flush  5-40 mL

## 2025-05-29 NOTE — PROGRESS NOTES
Unfortunately Ms. Dowell's condition significantly deteriorated overnight.  Family made her comfort care.  I was in the room and briefly discussed with her  and rest of the family.  They are very comfortable with the decision.

## 2025-05-29 NOTE — PROGRESS NOTES
[facility]  DEPARTMENT OF SPEECH/LANGUAGE PATHOLOGY  ATTEMPT NOTE  dEna CASTRO Magalys  5/29/2025  [id number]    SLP attempted to see Edna Dowell for dysphagia tx. Per chart review, pt has experienced a decline in status. Code status changed to DNR-CC. Spoke with BALTAZAR Antony, SLP visit deferred at this time. Please consult SLP team if any needs arise.     Kristine Salazar MA, CCC-SLP 5/29/2025

## 2025-05-30 VITALS — TEMPERATURE: 97.8 F | OXYGEN SATURATION: 92 %

## 2025-05-30 NOTE — DEATH NOTES
Death Pronouncement Note  Patient's Name: Edna Dowell   Patient's YOB: 1940  MRN Number: [id number]    Admitting Provider: Bee Bazzi MD  Attending Provider: Bee Bazzi*    Patient was examined and did not respond to verbal or tactile stimuli, no rise and fall of chest, no heart tones on auscultation, pupils were fixed and dilated.    I declared the patient dead on 5/30/2025 at 1:50 AM.    Electronically signed by Bari Meeks MD on 5/30/25 at 2:10 AM EDT

## 2025-05-30 NOTE — DISCHARGE SUMMARY
Patient was already transitioned to comfort care and passed away peacefully this morning. She was pronounced at 1:50am  
No

## 2025-05-30 NOTE — PROGRESS NOTES
This RN called [facility] to report the patient expiring. Ref number [id number]. [facility] did call and give the okay to release the patient to the  home.

## 2025-05-31 LAB
MICROORGANISM SPEC CULT: NORMAL
SPECIMEN DESCRIPTION: NORMAL

## 2025-06-01 LAB
MICROORGANISM SPEC CULT: NORMAL
MICROORGANISM SPEC CULT: NORMAL
SERVICE CMNT-IMP: NORMAL
SERVICE CMNT-IMP: NORMAL
SPECIMEN DESCRIPTION: NORMAL
SPECIMEN DESCRIPTION: NORMAL

## 2025-06-03 LAB
MICROORGANISM SPEC CULT: NORMAL
MICROORGANISM SPEC CULT: NORMAL
SERVICE CMNT-IMP: NORMAL
SERVICE CMNT-IMP: NORMAL
SPECIMEN DESCRIPTION: NORMAL
SPECIMEN DESCRIPTION: NORMAL

## (undated) DEVICE — Z INACTIVE USE 2863041 SPONGE GZ W4XL4IN 100% COT 16 PLY RADPQ HIGHLY ABSRB

## (undated) DEVICE — PREMIUM DRY TRAY LF: Brand: MEDLINE INDUSTRIES, INC.

## (undated) DEVICE — WIRE GUID DIAG PTFE COAT FIX COR STD XIBLE J TIP 7MM

## (undated) DEVICE — FORCEPS BX L240CM JAW DIA2.8MM L CAP W/ NDL MIC MESH TOOTH

## (undated) DEVICE — SUTURE VICRYL SZ 4-0 L18IN ABSRB UD L19MM PS-2 3/8 CIR PRIM J496H

## (undated) DEVICE — COUNTER NDL 30 COUNT FOAM STRP SGL MAG

## (undated) DEVICE — PENCIL ES CRD L10FT HND SWCHING ROCK SWCH W/ EDGE COAT BLDE

## (undated) DEVICE — GAUZE,SPONGE,2"X2",8PLY,STERILE,LF,2'S: Brand: MEDLINE

## (undated) DEVICE — Z INACTIVE NO ACTIVE SUPPLIER APPLICATOR MEDICATED 26 CC TINT HI-LITE ORNG STRL CHLORAPREP

## (undated) DEVICE — SHEET,T,THYROID,STERILE: Brand: MEDLINE

## (undated) DEVICE — 3M™ STERI-STRIP™ COMPOUND BENZOIN TINCTURE 40 BAGS/CARTON 4 CARTONS/CASE C1544: Brand: 3M™ STERI-STRIP™

## (undated) DEVICE — CATHETER PTCA L135CM BLLN L100MM DIA7MM INTRO SHTH 6FR 7ATM

## (undated) DEVICE — NEEDLE HYPO 25GA L1.5IN BLU POLYPR HUB S STL REG BVL STR

## (undated) DEVICE — ENDOSCOPIC KIT 1.1+ OP4 CA DE 2 GWN AAMI LEVEL 3

## (undated) DEVICE — SYRINGE MED 10ML LUERLOCK TIP W/O SFTY DISP

## (undated) DEVICE — DILATOR WITH AQ, HYDROPHILIC COATING: Brand: COOK

## (undated) DEVICE — Device

## (undated) DEVICE — GLOVE SURG SZ 6 THK91MIL LTX FREE SYN POLYISOPRENE ANTI

## (undated) DEVICE — Z INACTIVE USE 2660663 SOLUTION IRRIG 1000ML STRIL H2O USP PLAS POUR BTL

## (undated) DEVICE — DRESSING TRNSPAR W2XL2.75IN FLM SHT SEMIPERMEABLE WIND

## (undated) DEVICE — PACK,BASIC,IX: Brand: MEDLINE

## (undated) DEVICE — GUIDEWIRE VASC L60CM DIA0.018IN NIT INTMED STR TIP KINK

## (undated) DEVICE — CANISTER SUCTION VAC PORTABLE 1000 CC FOR INDIGO SYS ENGINE

## (undated) DEVICE — MARKER SURG SKIN UTIL REGULAR/FINE 2 TIP W/ RUL AND 9 LBL

## (undated) DEVICE — MINI STICK MICRO ACCESS 4FR

## (undated) DEVICE — GLOVE ORANGE PI 7   MSG9070

## (undated) DEVICE — ELECTRODE ES AD CRDLSS PT RET REM POLYHESIVE

## (undated) DEVICE — SOLUTION SURG PREP PVP IOD 10% 8 OZ BTL SCRB CARE

## (undated) DEVICE — RADIFOCUS GLIDEWIRE ADVANTAGE GUIDEWIRE: Brand: GLIDEWIRE ADVANTAGE

## (undated) DEVICE — ELECTRODE ES L2.75IN S STL INSUL BLDE W/ SL EDGE

## (undated) DEVICE — PINNACLE INTRODUCER SHEATH: Brand: PINNACLE

## (undated) DEVICE — CHECK-FLO PERFORMER INTRODUCER: Brand: PERFORMER

## (undated) DEVICE — DILATOR: Brand: COOK

## (undated) DEVICE — STRIP SKIN CLSR W0.25XL4IN WHT SPUNBOUND FBR NYL HI ADH

## (undated) DEVICE — INFLATION DEVICE: Brand: ENCORE™ 26

## (undated) DEVICE — DRAPE SHEET ULTRAGARD: Brand: MEDLINE

## (undated) DEVICE — INTENDED FOR TISSUE SEPARATION, AND OTHER PROCEDURES THAT REQUIRE A SHARP SURGICAL BLADE TO PUNCTURE OR CUT.: Brand: BARD-PARKER ® STAINLESS STEEL BLADES

## (undated) DEVICE — TUBING, SUCTION, 9/32" X 10', STRAIGHT: Brand: MEDLINE

## (undated) DEVICE — PINNACLE R/O II INTRODUCER SHEATH WITH RADIOPAQUE MARKER: Brand: PINNACLE

## (undated) DEVICE — ADHESIVE SKIN CLSR 0.7ML TOP DERMBND ADV

## (undated) DEVICE — CATHETER ANGIO STR 038IN 4FR MCRLP RED

## (undated) DEVICE — TOWEL,OR,DSP,ST,BLUE,STD,6/PK,12PK/CS: Brand: MEDLINE

## (undated) DEVICE — YANKAUER,FLEXIBLE HANDLE,REGLR CAPACITY: Brand: MEDLINE INDUSTRIES, INC.

## (undated) DEVICE — LINER,SEMI-RIGID,3000CC,50EA/CS: Brand: MEDLINE

## (undated) DEVICE — CATHETER PTCA L135CM BLLN L60MM DIA10MM SHTH 7FR 0.035IN

## (undated) DEVICE — GOWN,SIRUS,POLYRNF,BRTHSLV,XLN/XL,20/CS: Brand: MEDLINE